# Patient Record
Sex: FEMALE | Race: WHITE | Employment: OTHER | URBAN - METROPOLITAN AREA
[De-identification: names, ages, dates, MRNs, and addresses within clinical notes are randomized per-mention and may not be internally consistent; named-entity substitution may affect disease eponyms.]

---

## 2019-06-14 ENCOUNTER — HOSPITAL ENCOUNTER (OUTPATIENT)
Age: 69
Discharge: HOME | End: 2019-06-16

## 2019-06-14 ENCOUNTER — OFFICE VISIT (OUTPATIENT)
Dept: FAMILY MEDICINE CLINIC | Age: 69
End: 2019-06-14

## 2019-06-14 VITALS
SYSTOLIC BLOOD PRESSURE: 136 MMHG | TEMPERATURE: 97.7 F | WEIGHT: 162 LBS | DIASTOLIC BLOOD PRESSURE: 76 MMHG | HEART RATE: 100 BPM | BODY MASS INDEX: 26.99 KG/M2 | HEIGHT: 65 IN | OXYGEN SATURATION: 98 %

## 2019-06-14 DIAGNOSIS — M79.10 MYALGIA: ICD-10-CM

## 2019-06-14 DIAGNOSIS — R01.1 HEART MURMUR: ICD-10-CM

## 2019-06-14 DIAGNOSIS — R07.0 PAIN IN THROAT: Primary | ICD-10-CM

## 2019-06-14 LAB
ANION GAP SERPL CALCULATED.3IONS-SCNC: 19 MMOL/L (ref 7–16)
BASOPHILS ABSOLUTE: 0.07 E9/L (ref 0–0.2)
BASOPHILS RELATIVE PERCENT: 0.7 % (ref 0–2)
BUN BLDV-MCNC: 12 MG/DL (ref 8–23)
CALCIUM SERPL-MCNC: 10.1 MG/DL (ref 8.6–10.2)
CHLORIDE BLD-SCNC: 100 MMOL/L (ref 98–107)
CO2: 23 MMOL/L (ref 22–29)
CREAT SERPL-MCNC: 0.6 MG/DL (ref 0.5–1)
EOSINOPHILS ABSOLUTE: 0.12 E9/L (ref 0.05–0.5)
EOSINOPHILS RELATIVE PERCENT: 1.2 % (ref 0–6)
GFR AFRICAN AMERICAN: >60
GFR NON-AFRICAN AMERICAN: >60 ML/MIN/1.73
GLUCOSE BLD-MCNC: 109 MG/DL (ref 74–99)
HCT VFR BLD CALC: 39.5 % (ref 34–48)
HEMOGLOBIN: 12.2 G/DL (ref 11.5–15.5)
IMMATURE GRANULOCYTES #: 0.04 E9/L
IMMATURE GRANULOCYTES %: 0.4 % (ref 0–5)
LYMPHOCYTES ABSOLUTE: 2.02 E9/L (ref 1.5–4)
LYMPHOCYTES RELATIVE PERCENT: 20 % (ref 20–42)
MCH RBC QN AUTO: 27.4 PG (ref 26–35)
MCHC RBC AUTO-ENTMCNC: 30.9 % (ref 32–34.5)
MCV RBC AUTO: 88.8 FL (ref 80–99.9)
MONOCYTES ABSOLUTE: 0.81 E9/L (ref 0.1–0.95)
MONOCYTES RELATIVE PERCENT: 8 % (ref 2–12)
NEUTROPHILS ABSOLUTE: 7.06 E9/L (ref 1.8–7.3)
NEUTROPHILS RELATIVE PERCENT: 69.7 % (ref 43–80)
PDW BLD-RTO: 14.3 FL (ref 11.5–15)
PLATELET # BLD: 393 E9/L (ref 130–450)
PMV BLD AUTO: 10.5 FL (ref 7–12)
POTASSIUM SERPL-SCNC: 4.2 MMOL/L (ref 3.5–5)
RBC # BLD: 4.45 E12/L (ref 3.5–5.5)
RHEUMATOID FACTOR: 15 IU/ML (ref 0–13)
S PYO AG THROAT QL: NORMAL
SEDIMENTATION RATE, ERYTHROCYTE: 49 MM/HR (ref 0–20)
SODIUM BLD-SCNC: 142 MMOL/L (ref 132–146)
WBC # BLD: 10.1 E9/L (ref 4.5–11.5)

## 2019-06-14 PROCEDURE — 80048 BASIC METABOLIC PNL TOTAL CA: CPT

## 2019-06-14 PROCEDURE — 99214 OFFICE O/P EST MOD 30 MIN: CPT | Performed by: NURSE PRACTITIONER

## 2019-06-14 PROCEDURE — 86431 RHEUMATOID FACTOR QUANT: CPT

## 2019-06-14 PROCEDURE — 85025 COMPLETE CBC W/AUTO DIFF WBC: CPT

## 2019-06-14 PROCEDURE — 85651 RBC SED RATE NONAUTOMATED: CPT

## 2019-06-14 PROCEDURE — 36415 COLL VENOUS BLD VENIPUNCTURE: CPT

## 2019-06-14 PROCEDURE — 87880 STREP A ASSAY W/OPTIC: CPT | Performed by: NURSE PRACTITIONER

## 2019-06-14 ASSESSMENT — ENCOUNTER SYMPTOMS
EYES NEGATIVE: 1
CHEST TIGHTNESS: 0
CONSTIPATION: 0
SHORTNESS OF BREATH: 0
DIARRHEA: 0
ABDOMINAL PAIN: 0
NAUSEA: 0
COUGH: 0
ALLERGIC/IMMUNOLOGIC NEGATIVE: 1

## 2019-06-14 ASSESSMENT — PATIENT HEALTH QUESTIONNAIRE - PHQ9
SUM OF ALL RESPONSES TO PHQ QUESTIONS 1-9: 0
SUM OF ALL RESPONSES TO PHQ9 QUESTIONS 1 & 2: 0
1. LITTLE INTEREST OR PLEASURE IN DOING THINGS: 0
2. FEELING DOWN, DEPRESSED OR HOPELESS: 0
SUM OF ALL RESPONSES TO PHQ QUESTIONS 1-9: 0

## 2019-06-14 NOTE — PROGRESS NOTES
2019     Jenn Harris (:  1950) is a 77 y.o. female, here for evaluation of the following medical concerns:  Chief Complaint   Patient presents with    Neck Pain    Arm Pain    Knee Pain        HPI:  77 y.o. female presents Over the last several months had a pain in neck that came up to top of head. Saw a chiropractors. Also had a massage which helped immensely. In the meantime has achy shoulders and then down to knees. Family thought she had strep, however, no other sx. Some difficulty with some weakness. Her strep is negative but will obtain lab to eval for another autoimmune issue. I also discussed with her again, how important she quit cancelling her cardiac appointment especially in light the this recent weakness. Though no SOB she can not ignore the issue    She is a tough women who is somewhat wary of traditional medicine. Past Medical History:   Diagnosis Date    Heart murmur     Varicosities of leg        No current outpatient medications on file prior to visit. No current facility-administered medications on file prior to visit. No Known Allergies    Family History   Problem Relation Age of Onset    High Blood Pressure Mother     Heart Disease Father        Past Surgical History:   Procedure Laterality Date     SECTION      HIP FRACTURE SURGERY Right        Social History     Tobacco Use    Smoking status: Never Smoker    Smokeless tobacco: Never Used   Substance Use Topics    Alcohol use: No    Drug use: No       ROS:      Review of Systems   Constitutional: Negative. HENT: Negative. Eyes: Negative. Respiratory: Negative for cough, chest tightness and shortness of breath. Cardiovascular: Negative for chest pain, palpitations and leg swelling. Gastrointestinal: Negative for abdominal pain, constipation, diarrhea and nausea. Endocrine: Negative. Genitourinary: Negative.     Musculoskeletal: Positive for gait problem, joint swelling, myalgias, neck pain and neck stiffness. Negative for arthralgias. Skin: Negative. Allergic/Immunologic: Negative. Neurological: Positive for weakness. Negative for dizziness, tremors, light-headedness and headaches. Psychiatric/Behavioral: Negative. Vitals:    06/14/19 1149   BP: 136/76   Pulse: 100   Temp: 97.7 °F (36.5 °C)   SpO2: 98%   Weight: 162 lb (73.5 kg)   Height: 5' 5\" (1.651 m)     Estimated body mass index is 26.96 kg/m² as calculated from the following:    Height as of this encounter: 5' 5\" (1.651 m). Weight as of this encounter: 162 lb (73.5 kg). PHYSICAL EXAM:    VS:  Blood pressure 136/76, pulse 100, temperature 97.7 °F (36.5 °C), height 5' 5\" (1.651 m), weight 162 lb (73.5 kg), SpO2 98 %. Physical Exam   Constitutional: She is oriented to person, place, and time. She appears well-developed and well-nourished. No distress. HENT:   Head: Normocephalic and atraumatic. Right Ear: Hearing, tympanic membrane, external ear and ear canal normal.   Left Ear: Hearing, tympanic membrane, external ear and ear canal normal.   Nose: Nose normal. No mucosal edema or rhinorrhea. Mouth/Throat: Oropharynx is clear and moist and mucous membranes are normal. No oropharyngeal exudate. Eyes: Pupils are equal, round, and reactive to light. Conjunctivae and EOM are normal.   Neck: Normal range of motion. Neck supple. No JVD present. Carotid bruit is not present. No thyromegaly present. Cardiovascular: Normal rate, regular rhythm and intact distal pulses. Murmur heard. Systolic murmur is present with a grade of 4/6. Pulmonary/Chest: Effort normal and breath sounds normal. No respiratory distress. She has no wheezes. She has no rales. Abdominal: Soft. Bowel sounds are normal. She exhibits no distension and no mass. There is no hepatosplenomegaly. There is no tenderness. Musculoskeletal: Normal range of motion. She exhibits no edema, tenderness or deformity. Weak  4/5. Needed to use arms to up and down in chair and help on and off table   Neurological: She is alert and oriented to person, place, and time. She has normal strength and normal reflexes. No cranial nerve deficit or sensory deficit. Skin: Skin is warm, dry and intact. Capillary refill takes less than 2 seconds. No rash noted. Psychiatric: She has a normal mood and affect. Her speech is normal and behavior is normal. Judgment and thought content normal. Cognition and memory are normal.   Vitals reviewed. ASSESSMENT/PLAN:    Sydnie Rowland was seen today for neck pain, arm pain and knee pain. Diagnoses and all orders for this visit:    Pain in throat  -     POCT rapid strep A         Orders Placed This Encounter   Procedures    POCT rapid strep A        No follow-ups on file. An electronic signature was used to authenticate this note.     --YARITZA Woods - CNP on 6/14/2019 at 12:04 PM

## 2019-06-18 ENCOUNTER — TELEPHONE (OUTPATIENT)
Dept: FAMILY MEDICINE CLINIC | Age: 69
End: 2019-06-18

## 2019-06-18 NOTE — TELEPHONE ENCOUNTER
----- Message from YARITZA Hogan CNP sent at 6/18/2019  3:46 PM EDT -----  She has some inflammation going on. How is she feeling.  Will need to recheck in 3-4 months so should be seen back

## 2019-07-03 ENCOUNTER — OFFICE VISIT (OUTPATIENT)
Dept: CARDIOLOGY CLINIC | Age: 69
End: 2019-07-03

## 2019-07-03 VITALS
HEIGHT: 66 IN | WEIGHT: 165 LBS | BODY MASS INDEX: 26.52 KG/M2 | DIASTOLIC BLOOD PRESSURE: 80 MMHG | HEART RATE: 108 BPM | SYSTOLIC BLOOD PRESSURE: 134 MMHG | RESPIRATION RATE: 16 BRPM

## 2019-07-03 DIAGNOSIS — R06.02 SHORTNESS OF BREATH: ICD-10-CM

## 2019-07-03 DIAGNOSIS — R01.1 HEART MURMUR: Primary | ICD-10-CM

## 2019-07-03 PROCEDURE — 93000 ELECTROCARDIOGRAM COMPLETE: CPT | Performed by: INTERNAL MEDICINE

## 2019-07-03 PROCEDURE — 99204 OFFICE O/P NEW MOD 45 MIN: CPT | Performed by: INTERNAL MEDICINE

## 2019-07-03 RX ORDER — VITAMIN B COMPLEX
1 CAPSULE ORAL DAILY
COMMUNITY

## 2019-07-03 RX ORDER — UBIDECARENONE 75 MG
200 CAPSULE ORAL 2 TIMES DAILY
COMMUNITY

## 2019-07-03 RX ORDER — IBUPROFEN 200 MG
200 TABLET ORAL EVERY 6 HOURS PRN
COMMUNITY
End: 2020-02-24

## 2019-07-03 RX ORDER — CYANOCOBALAMIN (VITAMIN B-12) 500 MCG
400 LOZENGE ORAL 2 TIMES DAILY
COMMUNITY

## 2019-07-03 RX ORDER — ASCORBIC ACID 500 MG
500 TABLET ORAL DAILY
COMMUNITY

## 2019-07-08 ENCOUNTER — TELEPHONE (OUTPATIENT)
Dept: CARDIOLOGY | Age: 69
End: 2019-07-08

## 2020-01-31 ENCOUNTER — TELEPHONE (OUTPATIENT)
Dept: FAMILY MEDICINE CLINIC | Age: 70
End: 2020-01-31

## 2020-02-20 ENCOUNTER — OFFICE VISIT (OUTPATIENT)
Dept: FAMILY MEDICINE CLINIC | Age: 70
End: 2020-02-20

## 2020-02-20 VITALS
HEART RATE: 115 BPM | BODY MASS INDEX: 24.33 KG/M2 | HEIGHT: 67 IN | WEIGHT: 155 LBS | TEMPERATURE: 97.7 F | SYSTOLIC BLOOD PRESSURE: 130 MMHG | DIASTOLIC BLOOD PRESSURE: 70 MMHG | OXYGEN SATURATION: 96 %

## 2020-02-20 PROCEDURE — 99213 OFFICE O/P EST LOW 20 MIN: CPT | Performed by: PHYSICIAN ASSISTANT

## 2020-02-20 RX ORDER — AMOXICILLIN 500 MG/1
500 CAPSULE ORAL 3 TIMES DAILY
Qty: 30 CAPSULE | Refills: 0 | Status: ON HOLD
Start: 2020-02-20 | End: 2020-02-25 | Stop reason: HOSPADM

## 2020-02-20 RX ORDER — BROMPHENIRAMINE MALEATE, PSEUDOEPHEDRINE HYDROCHLORIDE, AND DEXTROMETHORPHAN HYDROBROMIDE 2; 30; 10 MG/5ML; MG/5ML; MG/5ML
5 SYRUP ORAL 4 TIMES DAILY PRN
Qty: 120 ML | Refills: 0 | Status: ON HOLD
Start: 2020-02-20 | End: 2020-02-25 | Stop reason: HOSPADM

## 2020-02-20 ASSESSMENT — ENCOUNTER SYMPTOMS
GASTROINTESTINAL NEGATIVE: 1
COUGH: 1
EYES NEGATIVE: 1

## 2020-02-20 NOTE — PROGRESS NOTES
normal. No congestion or rhinorrhea. Mouth/Throat:      Mouth: Mucous membranes are dry. Pharynx: Oropharynx is clear. No oropharyngeal exudate. Eyes:      Conjunctiva/sclera: Conjunctivae normal.      Pupils: Pupils are equal, round, and reactive to light. Neck:      Musculoskeletal: Normal range of motion and neck supple. Cardiovascular:      Rate and Rhythm: Normal rate and regular rhythm. Pulses: Normal pulses. Pulmonary:      Effort: Pulmonary effort is normal. No respiratory distress. Breath sounds: No stridor. Rales present. No wheezing or rhonchi. Chest:      Chest wall: No tenderness. Neurological:      Mental Status: She is alert. Assessment/Plan:  There are no diagnoses linked to this encounter. Pt. to follow up if PCP if no better 1 week.      Luis Gonzalez PA-C

## 2020-02-24 ENCOUNTER — OFFICE VISIT (OUTPATIENT)
Dept: FAMILY MEDICINE CLINIC | Age: 70
End: 2020-02-24

## 2020-02-24 VITALS
OXYGEN SATURATION: 93 % | DIASTOLIC BLOOD PRESSURE: 74 MMHG | WEIGHT: 156 LBS | TEMPERATURE: 97.4 F | HEIGHT: 66 IN | HEART RATE: 127 BPM | BODY MASS INDEX: 25.07 KG/M2 | SYSTOLIC BLOOD PRESSURE: 124 MMHG

## 2020-02-24 PROCEDURE — 93000 ELECTROCARDIOGRAM COMPLETE: CPT | Performed by: FAMILY MEDICINE

## 2020-02-24 PROCEDURE — 99204 OFFICE O/P NEW MOD 45 MIN: CPT | Performed by: FAMILY MEDICINE

## 2020-02-24 RX ORDER — MAGNESIUM OXIDE 400 MG/1
400 TABLET ORAL DAILY
COMMUNITY

## 2020-02-24 RX ORDER — FUROSEMIDE 20 MG/1
20 TABLET ORAL DAILY
Qty: 3 TABLET | Refills: 0 | Status: ON HOLD
Start: 2020-02-24 | End: 2020-02-28 | Stop reason: SDUPTHER

## 2020-02-24 RX ORDER — CALCIUM CARBONATE 500(1250)
500 TABLET ORAL DAILY
COMMUNITY

## 2020-02-24 ASSESSMENT — PATIENT HEALTH QUESTIONNAIRE - PHQ9
SUM OF ALL RESPONSES TO PHQ9 QUESTIONS 1 & 2: 0
1. LITTLE INTEREST OR PLEASURE IN DOING THINGS: 0
2. FEELING DOWN, DEPRESSED OR HOPELESS: 0
SUM OF ALL RESPONSES TO PHQ QUESTIONS 1-9: 0
SUM OF ALL RESPONSES TO PHQ QUESTIONS 1-9: 0

## 2020-02-24 NOTE — PROGRESS NOTES
[facility]  Office Progress Note - Dr. Evi Huitron  2/24/20    CC:   Chief Complaint   Patient presents with    Establish Care        S: Establishing care today. She had a cough recently. Noted lots of people at [facility] were ill as well. Maybe was going on about 3 weeks and worsened more recently. She went to urgent care after having SOB at night. She was given Rx for amoxil and Bromfed. She also had chiropractic care. She thinks she is improving. She had bene doctoring with Dr. Pepper helm in [facility] and an arthritis center in [facility]. She ultimately ddnt get any relief and she used some own home remedies. She has bene feeling well for the past month or two. She is using a pessary for uterine prolapse with [facility]. Reviewing her history, she has severe aortic stenosis which she saw cardiologist for last summer. Reviewing her symptoms, I am concerned that this may be more aortic stenosis related than respiratory infection. She reports exertional fatigue and shortness of breath. She has had to stop when climbing stairs recently. She has had no chest pain. No dizziness or lightheadedness. She does note orthopnea and PND. She has had to prop herself up recently. Denies ankle swelling, chest pain, pressure, heaviness. Chest x-ray performed today shows mild bilateral pleural effusions and vascular congestion. Awaiting radiologist read. EKG today shows sinus tachycardia with rate 119. Leftward axis. Poor R wave progression. Q waves in V1 and V2. ST elevations in V3 and V4 with some depressions in V5 and V6, suspect ventricular hypertrophy with strain pattern. When compared to her EKG from July 2019, change has occurred.     Past Medical History:   Diagnosis Date    Heart murmur     Heart murmur     Since birth    Varicosities of leg        Family History   Problem Relation Age of Onset    High Blood Pressure Mother     Heart Disease Father    [facility] Heart Disease Brother         cabg    Heart Disease Sister     No Known Problems Brother     Other Sister         Tumor, unsure, possibly uterine . Past Surgical History:   Procedure Laterality Date     SECTION      HIP FRACTURE SURGERY Right In her 46s. after a fall. Dr. 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 Ron [place] 55. ROS:  No CP, No palpitations,   +sob, +cough,   No abd pain, No heartburn,   No headaches,   No tingling, No numbness, No weakness,   No bowel changes, No hematochezia, No melena,  No bladder changes, No hematuria  No skin rashes, No skin lesions. No vision changes, No hearing changes,   No polyuria, polydipsia, polyphagia. Stable mood. ROS otherwise negative unless as listed in HPI. Chart reviewed and updated where appropriate for PMH, Fam, and Soc Hx. Physical Exam   /74 (Site: Left Upper Arm, Position: Sitting, Cuff Size: Medium Adult)   Pulse 127   Temp 97.4 °F (36.3 °C) (Temporal)   Ht 5' 6\" (1.676 m)   Wt 156 lb (70.8 kg)   SpO2 93%   BMI 25.18 kg/m² Pulse on my recheck 114 bpm.  Wt Readings from Last 3 Encounters:   20 156 lb (70.8 kg)   20 155 lb (70.3 kg)   19 165 lb (74.8 kg)       Constitutional:    She is oriented to person, place, and time. She appears well-developed and well-nourished. HENT:    Nose: Nose normal.    Mouth/Throat: Oropharynx is clear and moist.   Eyes:    Conjunctivae are normal.    Pupils are equal, round, and reactive to light. EOMI. Neck:    Normal range of motion. No thyromegaly or nodules noted. No bruit. No adenopathy. Cardiovascular:    Mild tachycardia. , regular rhythm and normal heart sounds.      3 out of 6 right upper sternal border systolic murmur. No gallop and no friction rub. Pulmonary/Chest:    Effort normal and breath sounds normal.    No wheezes. No rales or rhonchi. Abdominal:    Soft. Thin. Bowel sounds are normal.    No distension. No tenderness. Musculoskeletal:    Normal range of motion. No joint swelling noted. No pitting peripheral edema. Skin:    Skin is warm and dry. No rashes, lesions. Psychiatric:    She has a normal mood and affect. Normal groom and dress. Current Outpatient Medications on File Prior to Visit   Medication Sig Dispense Refill    magnesium oxide (MAG-OX) 400 MG tablet Take 400 mg by mouth daily      calcium carbonate (OSCAL) 500 MG TABS tablet Take 500 mg by mouth daily      amoxicillin (AMOXIL) 500 MG capsule Take 1 capsule by mouth 3 times daily for 10 days 30 capsule 0    brompheniramine-pseudoephedrine-DM 2-30-10 MG/5ML syrup Take 5 mLs by mouth 4 times daily as needed for Congestion or Cough 120 mL 0    vitamin E 1000 units capsule Take 1,000 Units by mouth daily      Coenzyme Q10 (CO Q-10) 100 MG CAPS Take by mouth daily      vitamin C (ASCORBIC ACID) 500 MG tablet Take 500 mg by mouth daily      b complex vitamins capsule Take 1 capsule by mouth daily      NONFORMULARY Indications: hawthorn berry, soy lecithin, kelp white oak bark, lira seal root       Cod Liver Oil 1000 MG CAPS Take by mouth      Magnesium ([employer] TRIPLE MAGNESIUM COMPLEX) 400 MG CAPS Take by mouth       No current facility-administered medications on file prior to visit. Patient Active Problem List   Diagnosis Code    Heart murmur R01.1        Assessment / Plan  Elio Thurman was seen today for establish care. Diagnoses and all orders for this visit:    Shortness of breath -new  -     EKG 12 Lead; Future  -     XR CHEST STANDARD (2 VW); Future  -     EKG 12 Lead  -     furosemide (LASIX) 20 MG tablet;  Take 1 tablet by mouth daily    Aortic valve stenosis, etiology of cardiac valve disease

## 2020-02-25 ENCOUNTER — HOSPITAL ENCOUNTER (INPATIENT)
Age: 70
LOS: 1 days | Discharge: ANOTHER HOSPITAL | DRG: 291 | End: 2020-02-25
Attending: EMERGENCY MEDICINE | Admitting: FAMILY MEDICINE

## 2020-02-25 ENCOUNTER — APPOINTMENT (OUTPATIENT)
Dept: GENERAL RADIOLOGY | Age: 70
DRG: 291 | End: 2020-02-25

## 2020-02-25 ENCOUNTER — HOSPITAL ENCOUNTER (INPATIENT)
Age: 70
LOS: 3 days | Discharge: HOME | DRG: 286 | End: 2020-02-28
Attending: INTERNAL MEDICINE | Admitting: HOSPITALIST

## 2020-02-25 VITALS
BODY MASS INDEX: 24.43 KG/M2 | HEART RATE: 101 BPM | TEMPERATURE: 97.5 F | WEIGHT: 152 LBS | SYSTOLIC BLOOD PRESSURE: 127 MMHG | RESPIRATION RATE: 16 BRPM | OXYGEN SATURATION: 94 % | DIASTOLIC BLOOD PRESSURE: 75 MMHG | HEIGHT: 66 IN

## 2020-02-25 PROBLEM — I50.9 CHF WITH UNKNOWN LVEF (HCC): Status: ACTIVE | Noted: 2020-02-25

## 2020-02-25 PROBLEM — R63.4 WEIGHT LOSS: Status: ACTIVE | Noted: 2020-02-25

## 2020-02-25 PROBLEM — I35.0 AORTIC VALVE STENOSIS: Status: ACTIVE | Noted: 2020-02-25

## 2020-02-25 PROBLEM — I35.0 AORTIC STENOSIS, MILD: Status: ACTIVE | Noted: 2020-02-25

## 2020-02-25 PROBLEM — J96.01 ACUTE RESPIRATORY FAILURE WITH HYPOXIA (HCC): Status: ACTIVE | Noted: 2020-02-25

## 2020-02-25 PROBLEM — I48.92 ATRIAL FLUTTER (HCC): Status: ACTIVE | Noted: 2020-02-25

## 2020-02-25 PROBLEM — R06.00 DYSPNEA: Status: ACTIVE | Noted: 2020-02-25

## 2020-02-25 LAB
ANION GAP SERPL CALCULATED.3IONS-SCNC: 17 MMOL/L (ref 7–16)
APTT: 32.7 SEC (ref 24.5–35.1)
BASOPHILS ABSOLUTE: 0.07 E9/L (ref 0–0.2)
BASOPHILS RELATIVE PERCENT: 0.7 % (ref 0–2)
BUN BLDV-MCNC: 12 MG/DL (ref 8–23)
CALCIUM SERPL-MCNC: 9.6 MG/DL (ref 8.6–10.2)
CHLORIDE BLD-SCNC: 92 MMOL/L (ref 98–107)
CO2: 25 MMOL/L (ref 22–29)
CREAT SERPL-MCNC: 0.6 MG/DL (ref 0.5–1)
D DIMER: 203 NG/ML DDU
EKG ATRIAL RATE: 106 BPM
EKG ATRIAL RATE: 345 BPM
EKG P AXIS: 17 DEGREES
EKG P AXIS: 56 DEGREES
EKG P-R INTERVAL: 158 MS
EKG Q-T INTERVAL: 326 MS
EKG Q-T INTERVAL: 354 MS
EKG QRS DURATION: 84 MS
EKG QRS DURATION: 98 MS
EKG QTC CALCULATION (BAZETT): 450 MS
EKG QTC CALCULATION (BAZETT): 470 MS
EKG R AXIS: -32 DEGREES
EKG R AXIS: -35 DEGREES
EKG T AXIS: 114 DEGREES
EKG T AXIS: 162 DEGREES
EKG VENTRICULAR RATE: 106 BPM
EKG VENTRICULAR RATE: 115 BPM
EOSINOPHILS ABSOLUTE: 0.1 E9/L (ref 0.05–0.5)
EOSINOPHILS RELATIVE PERCENT: 0.9 % (ref 0–6)
GFR AFRICAN AMERICAN: >60
GFR NON-AFRICAN AMERICAN: >60 ML/MIN/1.73
GLUCOSE BLD-MCNC: 151 MG/DL (ref 74–99)
HCT VFR BLD CALC: 44.1 % (ref 34–48)
HEMOGLOBIN: 13.4 G/DL (ref 11.5–15.5)
IMMATURE GRANULOCYTES #: 0.07 E9/L
IMMATURE GRANULOCYTES %: 0.7 % (ref 0–5)
INR BLD: 1.1
LV EF: 38 %
LVEF MODALITY: NORMAL
LYMPHOCYTES ABSOLUTE: 1.88 E9/L (ref 1.5–4)
LYMPHOCYTES RELATIVE PERCENT: 17.6 % (ref 20–42)
MCH RBC QN AUTO: 26.1 PG (ref 26–35)
MCHC RBC AUTO-ENTMCNC: 30.4 % (ref 32–34.5)
MCV RBC AUTO: 85.8 FL (ref 80–99.9)
MONOCYTES ABSOLUTE: 0.7 E9/L (ref 0.1–0.95)
MONOCYTES RELATIVE PERCENT: 6.6 % (ref 2–12)
NEUTROPHILS ABSOLUTE: 7.84 E9/L (ref 1.8–7.3)
NEUTROPHILS RELATIVE PERCENT: 73.5 % (ref 43–80)
PDW BLD-RTO: 16.3 FL (ref 11.5–15)
PLATELET # BLD: 538 E9/L (ref 130–450)
PMV BLD AUTO: 10.7 FL (ref 7–12)
POTASSIUM SERPL-SCNC: 3.5 MMOL/L (ref 3.5–5)
PRO-BNP: ABNORMAL PG/ML (ref 0–125)
PROTHROMBIN TIME: 12.7 SEC (ref 9.3–12.4)
RBC # BLD: 5.14 E12/L (ref 3.5–5.5)
SODIUM BLD-SCNC: 134 MMOL/L (ref 132–146)
TROPONIN: <0.01 NG/ML (ref 0–0.03)
TSH SERPL DL<=0.05 MIU/L-ACNC: 2.18 UIU/ML (ref 0.27–4.2)
WBC # BLD: 10.7 E9/L (ref 4.5–11.5)

## 2020-02-25 PROCEDURE — 6360000002 HC RX W HCPCS: Performed by: EMERGENCY MEDICINE

## 2020-02-25 PROCEDURE — 85025 COMPLETE CBC W/AUTO DIFF WBC: CPT

## 2020-02-25 PROCEDURE — 80048 BASIC METABOLIC PNL TOTAL CA: CPT

## 2020-02-25 PROCEDURE — 93010 ELECTROCARDIOGRAM REPORT: CPT | Performed by: INTERNAL MEDICINE

## 2020-02-25 PROCEDURE — 99285 EMERGENCY DEPT VISIT HI MDM: CPT

## 2020-02-25 PROCEDURE — 93005 ELECTROCARDIOGRAM TRACING: CPT | Performed by: EMERGENCY MEDICINE

## 2020-02-25 PROCEDURE — 85378 FIBRIN DEGRADE SEMIQUANT: CPT

## 2020-02-25 PROCEDURE — 2060000000 HC ICU INTERMEDIATE R&B

## 2020-02-25 PROCEDURE — 71045 X-RAY EXAM CHEST 1 VIEW: CPT

## 2020-02-25 PROCEDURE — 2580000003 HC RX 258: Performed by: HOSPITALIST

## 2020-02-25 PROCEDURE — 84484 ASSAY OF TROPONIN QUANT: CPT

## 2020-02-25 PROCEDURE — 85610 PROTHROMBIN TIME: CPT

## 2020-02-25 PROCEDURE — 6360000002 HC RX W HCPCS: Performed by: FAMILY MEDICINE

## 2020-02-25 PROCEDURE — 94761 N-INVAS EAR/PLS OXIMETRY MLT: CPT

## 2020-02-25 PROCEDURE — 96374 THER/PROPH/DIAG INJ IV PUSH: CPT

## 2020-02-25 PROCEDURE — 94664 DEMO&/EVAL PT USE INHALER: CPT

## 2020-02-25 PROCEDURE — 93306 TTE W/DOPPLER COMPLETE: CPT

## 2020-02-25 PROCEDURE — 6370000000 HC RX 637 (ALT 250 FOR IP): Performed by: FAMILY MEDICINE

## 2020-02-25 PROCEDURE — 84443 ASSAY THYROID STIM HORMONE: CPT

## 2020-02-25 PROCEDURE — 85730 THROMBOPLASTIN TIME PARTIAL: CPT

## 2020-02-25 PROCEDURE — 6360000002 HC RX W HCPCS: Performed by: HOSPITALIST

## 2020-02-25 PROCEDURE — 99255 IP/OBS CONSLTJ NEW/EST HI 80: CPT | Performed by: INTERNAL MEDICINE

## 2020-02-25 PROCEDURE — 83880 ASSAY OF NATRIURETIC PEPTIDE: CPT

## 2020-02-25 PROCEDURE — 99223 1ST HOSP IP/OBS HIGH 75: CPT | Performed by: FAMILY MEDICINE

## 2020-02-25 RX ORDER — POTASSIUM CHLORIDE 20 MEQ/1
20 TABLET, EXTENDED RELEASE ORAL 2 TIMES DAILY WITH MEALS
Status: DISCONTINUED | OUTPATIENT
Start: 2020-02-25 | End: 2020-02-25 | Stop reason: HOSPADM

## 2020-02-25 RX ORDER — ACETAMINOPHEN 650 MG/1
650 SUPPOSITORY RECTAL EVERY 6 HOURS PRN
Status: DISCONTINUED | OUTPATIENT
Start: 2020-02-25 | End: 2020-02-28 | Stop reason: HOSPADM

## 2020-02-25 RX ORDER — POLYETHYLENE GLYCOL 3350 17 G/17G
17 POWDER, FOR SOLUTION ORAL DAILY PRN
Status: DISCONTINUED | OUTPATIENT
Start: 2020-02-25 | End: 2020-02-28 | Stop reason: HOSPADM

## 2020-02-25 RX ORDER — ACETAMINOPHEN 325 MG/1
650 TABLET ORAL EVERY 6 HOURS PRN
Status: DISCONTINUED | OUTPATIENT
Start: 2020-02-25 | End: 2020-02-28 | Stop reason: HOSPADM

## 2020-02-25 RX ORDER — SODIUM CHLORIDE 0.9 % (FLUSH) 0.9 %
10 SYRINGE (ML) INJECTION EVERY 12 HOURS SCHEDULED
Status: DISCONTINUED | OUTPATIENT
Start: 2020-02-25 | End: 2020-02-28 | Stop reason: HOSPADM

## 2020-02-25 RX ORDER — SODIUM CHLORIDE 0.9 % (FLUSH) 0.9 %
10 SYRINGE (ML) INJECTION PRN
Status: DISCONTINUED | OUTPATIENT
Start: 2020-02-25 | End: 2020-02-28 | Stop reason: HOSPADM

## 2020-02-25 RX ORDER — PROMETHAZINE HYDROCHLORIDE 25 MG/1
12.5 TABLET ORAL EVERY 6 HOURS PRN
Status: DISCONTINUED | OUTPATIENT
Start: 2020-02-25 | End: 2020-02-28 | Stop reason: HOSPADM

## 2020-02-25 RX ORDER — PROCHLORPERAZINE EDISYLATE 5 MG/ML
10 INJECTION INTRAMUSCULAR; INTRAVENOUS EVERY 6 HOURS PRN
Status: DISCONTINUED | OUTPATIENT
Start: 2020-02-25 | End: 2020-02-25 | Stop reason: HOSPADM

## 2020-02-25 RX ORDER — ACETAMINOPHEN 650 MG/1
650 SUPPOSITORY RECTAL EVERY 6 HOURS PRN
Status: DISCONTINUED | OUTPATIENT
Start: 2020-02-25 | End: 2020-02-25 | Stop reason: HOSPADM

## 2020-02-25 RX ORDER — FUROSEMIDE 10 MG/ML
40 INJECTION INTRAMUSCULAR; INTRAVENOUS ONCE
Status: COMPLETED | OUTPATIENT
Start: 2020-02-25 | End: 2020-02-25

## 2020-02-25 RX ORDER — FUROSEMIDE 10 MG/ML
40 INJECTION INTRAMUSCULAR; INTRAVENOUS DAILY
Status: DISCONTINUED | OUTPATIENT
Start: 2020-02-25 | End: 2020-02-26

## 2020-02-25 RX ORDER — ONDANSETRON 2 MG/ML
4 INJECTION INTRAMUSCULAR; INTRAVENOUS EVERY 6 HOURS PRN
Status: DISCONTINUED | OUTPATIENT
Start: 2020-02-25 | End: 2020-02-28 | Stop reason: HOSPADM

## 2020-02-25 RX ORDER — FUROSEMIDE 10 MG/ML
40 INJECTION INTRAMUSCULAR; INTRAVENOUS 2 TIMES DAILY
Status: DISCONTINUED | OUTPATIENT
Start: 2020-02-25 | End: 2020-02-25 | Stop reason: HOSPADM

## 2020-02-25 RX ORDER — ACETAMINOPHEN 325 MG/1
650 TABLET ORAL EVERY 6 HOURS PRN
Status: DISCONTINUED | OUTPATIENT
Start: 2020-02-25 | End: 2020-02-25 | Stop reason: HOSPADM

## 2020-02-25 RX ORDER — POLYETHYLENE GLYCOL 3350 17 G/17G
17 POWDER, FOR SOLUTION ORAL DAILY PRN
Status: DISCONTINUED | OUTPATIENT
Start: 2020-02-25 | End: 2020-02-25 | Stop reason: HOSPADM

## 2020-02-25 RX ORDER — CALCIUM CARBONATE 500(1250)
500 TABLET ORAL DAILY
Status: DISCONTINUED | OUTPATIENT
Start: 2020-02-25 | End: 2020-02-25 | Stop reason: HOSPADM

## 2020-02-25 RX ADMIN — SODIUM CHLORIDE, PRESERVATIVE FREE 10 ML: 5 INJECTION INTRAVENOUS at 21:55

## 2020-02-25 RX ADMIN — CALCIUM 500 MG: 500 TABLET ORAL at 08:56

## 2020-02-25 RX ADMIN — FUROSEMIDE 40 MG: 10 INJECTION, SOLUTION INTRAMUSCULAR; INTRAVENOUS at 05:41

## 2020-02-25 RX ADMIN — ALBUTEROL SULFATE 2.5 MG: 2.5 SOLUTION RESPIRATORY (INHALATION) at 05:42

## 2020-02-25 RX ADMIN — ENOXAPARIN SODIUM 40 MG: 40 INJECTION SUBCUTANEOUS at 08:56

## 2020-02-25 RX ADMIN — FUROSEMIDE 40 MG: 10 INJECTION, SOLUTION INTRAMUSCULAR; INTRAVENOUS at 19:00

## 2020-02-25 RX ADMIN — POTASSIUM CHLORIDE 20 MEQ: 20 TABLET, EXTENDED RELEASE ORAL at 08:56

## 2020-02-25 ASSESSMENT — PAIN SCALES - GENERAL
PAINLEVEL_OUTOF10: 0

## 2020-02-25 ASSESSMENT — ENCOUNTER SYMPTOMS
BLOOD IN STOOL: 0
ABDOMINAL PAIN: 0
APNEA: 1
DIARRHEA: 0
PHOTOPHOBIA: 0
CONSTIPATION: 0
SINUS PAIN: 0
CHEST TIGHTNESS: 0
BACK PAIN: 0
WHEEZING: 0
SHORTNESS OF BREATH: 1
VOMITING: 0
NAUSEA: 0
COUGH: 0

## 2020-02-25 NOTE — H&P
Historical Provider, MD   Cod Liver Oil 1000 MG CAPS Take by mouth    Historical Provider, MD       Allergies:  Patient has no known allergies. Social History:      TOBACCO:   reports that she has never smoked. She has never used smokeless tobacco.  ETOH:   reports no history of alcohol use. Family History:      Reviewed in detail :        Problem Relation Age of Onset    High Blood Pressure Mother     Heart Disease Father     Heart Disease Brother         cabg    Heart Disease Sister     No Known Problems Brother     Other Sister         Tumor, unsure, possibly uterine . REVIEW OF SYSTEMS:   Pertinent positives as noted in the HPI. All other systems reviewed and negative. PHYSICAL EXAM:    /78   Pulse 105   Temp 96.8 °F (36 °C) (Temporal)   Resp 20   SpO2 96%     General appearance:  Lying comfortably in bed. No apparent distress. HEENT:  Normal cephalic, atraumatic without obvious deformity. Pupils equal, round, and reactive to light. Extra ocular muscles intact. Conjunctivae/corneas clear. Neck: Supple, with full range of motion. No jugular venous distention. Trachea midline. Respiratory: Decreased breath sounds bibasilarly. Cardiovascular: Normal S1/S2. Regular rhythm and rate. Abdomen: Soft, non-tender, non-distended with normal bowel sounds. Musculoskeletal:  No clubbing, cyanosis or edema bilaterally. Full range of motion without deformity. Skin: Skin color, texture, turgor normal.  No rashes or lesions. Neurologic:  No focal deficit.   Psychiatric:  Alert and oriented, thought content appropriate, normal insight  Peripheral Pulses: +2 palpable, equal bilaterally       CXR:   Xr Chest Portable    Result Date: 2020  Patient MRN:  [id number] : 1950 Age: 68 years Gender: Female Order Date:  2020 5:15 AM EXAM: XR CHEST PORTABLE one image INDICATION:  chest pain chest pain COMPARISON: 2020 FINDINGS: There is low lung volumes with borderline cardiomegaly. There is vascular congestion with the progressive perihilar and bibasilar infiltrates and pleural effusions. Worsening CHF. EKG:  I have reviewed the EKG with the following interpretation: Sinus rhythm, heart rate 106 bpm.  LVH    Echocardiogram:  Summary   Moderately reduced left ventricular systolic function. Ejection fraction is visually estimated at 35-40%. Moderate concentric left ventricular hypertrophy. Normal right ventricular size and function (TAPSE 1.8 cm). There is doppler evidence of stage II diastolic dysfunction. Moderately dilated left atrium by volume index. Mild mitral regurgitation. Critical aortic stenosis (AV peak velocity 5.4 m/s, AV mean gradient 75   mmHg, AV area 0.5 cm2). Mild tricuspid regurgitation. PASP is estimated at 46 mmHg. Small pericardial effusion. Bilateral pleural effusions. Labs:     Recent Labs     02/25/20  0530   WBC 10.7   HGB 13.4   HCT 44.1   *     Recent Labs     02/25/20  0530      K 3.5   CL 92*   CO2 25   BUN 12   CREATININE 0.6   CALCIUM 9.6     No results for input(s): AST, ALT, BILIDIR, BILITOT, ALKPHOS in the last 72 hours.   Recent Labs     02/25/20  0530   INR 1.1     Recent Labs     02/25/20  0530   TROPONINI <0.01       Urinalysis:    No results found for: NITRU, WBCUA, BACTERIA, RBCUA, BLOODU, [address], [facility] Problems    Diagnosis Date Noted    Aortic stenosis, mild [I35.0] 02/25/2020       ASSESSMENT:  Critical aortic stenosis  Acute combined systolic and diastolic CHF  Bilateral pleural effusion  Small pericardial effusion    PLAN:  Admit to telemetry  Start IV Lasix, monitor strict I/O, daily weight  Will slowly initiate ACE inhibitor, beta-blocker therapy as blood pressure permits  Resume home medications  Cardiology consult, discussed with Dr. Garland Cook who had kindly agreed to see patient in consultation  Cardiothoracic consult for TAVR evaluation  Activity as tolerated        DVT Prophylaxis: Lovenox  Diet: DIET LOW SODIUM 2 GM;  Code Status: Full Code    PT/OT Eval Status: pending    Dispo -TBD       Carmelina Steele MD 2/25/2020 5:00 PM    Thank you Tianna Bundy MD for the opportunity to be involved in this patient's care. If you have any questions or concerns please feel free to contact me at [phone].

## 2020-02-25 NOTE — ED PROVIDER NOTES
Patient was signed out to me pending final laboratory results. Patient's troponin is negative, d-dimer is below threshold for testing further. Chest x-ray shows pleural effusions and vascular congestion suspicious for CHF. With patient's new atrial flutter this is likely the cause of her hypoxia. After reassessing the patient she appeared to be in a more regular rhythm, repeat EKG showed a sinus tachycardia 106 with no findings suggestive of acute ischemia or injury. Family medicine was contacted for admission, will evaluate at bedside and admit. Patient rested comfortably, remaining oxygen dependent during her ED course. She was given a dose of diuretic prior to signout. HPI:  20, Time: 4:57 AM        Carlos Reyna is a 68 y.o. female presenting to the ED for shortness of breath, beginning 2-3 hours ago. The complaint has been persistent, moderate in severity, and worsened by nothing. Patient denies any chest pain or pressure, no leg swelling or leg pain, no recent travel, no colored sputum production or hemoptysis. Patient denies any change in bowel bladder habit patterns associated; nausea vomiting. No rotations, no syncope no near syncopal episodes reported no diaphoresis. Symptoms are slightly exacerbated by exertion. No relieving factors. Review of Systems:   Pertinent positives and negatives are stated within HPI, all other systems reviewed and are negative.    --------------------------------------------- PAST HISTORY ---------------------------------------------  Past Medical History:  has a past medical history of Heart murmur, Heart murmur, and Varicosities of leg. Past Surgical History:  has a past surgical history that includes  section and Hip fracture surgery (Right, In her 46s. after a fall. ). Social History:  reports that she has never smoked. She has never used smokeless tobacco. She reports that she does not drink alcohol or use drugs.     Family History: Anion Gap 17 (H) 7 - 16 mmol/L    Glucose 151 (H) 74 - 99 mg/dL    BUN 12 8 - 23 mg/dL    CREATININE 0.6 0.5 - 1.0 mg/dL    GFR Non-African American >60 >=60 mL/min/1.73    GFR African American >60     Calcium 9.6 8.6 - 10.2 mg/dL   EKG 12 Lead   Result Value Ref Range    Ventricular Rate 115 BPM    Atrial Rate 345 BPM    QRS Duration 98 ms    Q-T Interval 326 ms    QTc Calculation (Bazett) 450 ms    P Axis 56 degrees    R Axis -32 degrees    T Axis 114 degrees   EKG 12 Lead   Result Value Ref Range    Ventricular Rate 106 BPM    Atrial Rate 106 BPM    P-R Interval 158 ms    QRS Duration 84 ms    Q-T Interval 354 ms    QTc Calculation (Bazett) 470 ms    P Axis 17 degrees    R Axis -35 degrees    T Axis 162 degrees       RADIOLOGY:  Interpreted by Radiologist.  XR CHEST PORTABLE    (Results Pending)   Initial reading-+ CHF    ------------------------- NURSING NOTES AND VITALS REVIEWED ---------------------------  The nursing notes within the ED encounter and vital signs as below have been reviewed. BP (!) 144/94   Pulse 105   Temp 97.3 °F (36.3 °C) (Oral)   Resp 20   Ht 5' 6\" (1.676 m)   Wt 152 lb (68.9 kg)   SpO2 94%   BMI 24.53 kg/m²   Oxygen Saturation Interpretation: Abnormal    ---------------------------------------------------PHYSICAL EXAM--------------------------------------    Constitutional/General: Alert and oriented x3, well appearing, non toxic in NAD  Head: Normocephalic and atraumatic  Eyes: PERRL, EOMI  Mouth: Oropharynx clear, handling secretions, no trismus  Neck: Supple, full ROM,   Pulmonary: Lungs clear to auscultation bilaterally, no wheezes, rales, or rhonchi. Not in respiratory distress  Cardiovascular:  Regular rate and rhythm, no murmurs, gallops, or rubs. 2+ distal pulses  Abdomen: Soft, non tender, non distended, no organomegaly, no masses no rebound tenderness or guarding no rigidity. Normal active bowel sounds. Extremities: Moves all extremities x 4.  Warm and well perfused; no calf tenderness no cords, no clinical signs of DVT no edema  Skin: warm and dry without rash  Neurologic: GCS 15, cranial nerves II through XII grossly intact with no focal deficits. No meningeal signs  Psych: Normal Affect      ------------------------------ ED COURSE/MEDICAL DECISION MAKING----------------------  Medications   furosemide (LASIX) injection 40 mg (40 mg Intravenous Given 2/25/20 0541)   albuterol (PROVENTIL) nebulizer solution 2.5 mg (2.5 mg Nebulization Given 2/25/20 0542)         ED COURSE:     Medical Decision Making:   Differential diagnoses  CHF, pneumonia, asthma, reactive airway disease, PE, atypical MI, cardiac dysrhythmia, influenza, to name a few. Patient's not had any colored sputum production hemoptysis but states she has had a recent diagnosis of \" bronchitis\" for which she was prescribed amoxicillin. Patient denies any chest heaviness/pain or pressure to suggest an MI.    EKG #1:  Interpreted by emergency department physician unless otherwise noted. Time:  04:45    Rate: 115  Rhythm: Atrial flutter. Interpretation: nonspecific ST and T waves findings, +LVH. ? New onset Aflutter compared to old EKG 2/24/2020    Counseling: The emergency provider has spoken with the patient and spouse/SO and discussed todays results, in addition to providing specific details for the plan of care and counseling regarding the diagnosis and prognosis. Questions are answered at this time and they are agreeable with the plan.    --------------------------------- IMPRESSION AND DISPOSITION ---------------------------------    IMPRESSION  1. Acute congestive heart failure, unspecified heart failure type ([facility] [address]    2. New onset atrial flutter (HCC)        DISPOSITION  Disposition:   6:05 AM  I have signed this patient out to the oncoming physician, Dr. Lizzy Maddox. I have discussed the patient's initial exam, treatment and plan of care with the on coming physician.   I have notified the patient / family of

## 2020-02-25 NOTE — DISCHARGE SUMMARY
[address]  Discharge Summary      Patient ID:  Magaly Guerrier  [id number]  85 y.o.  1950    Admit date: 2/25/2020    Discharge date and time: 2/25/20    Location of discharge: [facility]     Admitting Physician: Armand Schaumann, MD     Discharge Physician: Clovis Hughes    Consults: cardiology    Admission Diagnoses: CHF with unknown LVEF ([address] [I50.9]  CHF with unknown LVEF ([address] [I50.9]    Discharge Diagnoses: Principal Problem:    Acute respiratory failure with hypoxia ([address]  Active Problems:    CHF with unknown LVEF ([address]    Aortic valve stenosis    Dyspnea    Atrial flutter ([address]    Weight loss  Resolved Problems:    * No resolved hospital problems. *      Hospital Course: Magaly Guerrier is a very pleasant 68year old female with past medical history of aortic stenosis who presents with worsening dyspnea on exertion. She admitted to heart palpitations when laying flat which self resolves after a few seconds. She does not have any lightheadedness or dizziness with ambulation. She denies orthostatic symptoms. Interestingly, patient notes a 15-20 lbs weight loss since May 2019 due to suffering from different \"ailments\". She did establish with a new PCP Rick Evans MD on 2/24/20 who suspected her aortic valve disease was progressing and ordered an in office EKG and Chest xray which supported vascular congestion. She was prescribed Lasix to take and since she was set to see her cardiologist Dr Gen Shi tomorrow it was felt safe to follow up outpatient. In the interim, she felt exceedingly dyspneic and presented to the  ED. Was admitted for further evaluation and mgt. She was hypoxic and required 4 L of oxygen in the ED. Cardiology was consulted and ECHO was conducted. Per cardiologist (Dr. Karolina Burton) she has severe aortic stenosis with pleural effusion and will need to be transferred downtown at [facility] for further workup for possible TAVR.  Troponin in the ED was negative, she had new onset of atrial flutter but spontaneously converted back to sinus rhythm. She received lasix 40 mg IV one dose, proventil nebulizer in the ED and potassium was replaced with KCLOR 20 mEq. Post Discharge Follow Up Issues:     Discharged Condition: stable    Significant Diagnostic Studies:   XR CHEST PORTABLE   Final Result   Worsening CHF.                  Treatments: Lasix, potassium, albuterol    Disposition: transfer to [facility]    Patient Instructions:    Severiano Dial   [place] Medication Instructions [facility]:[id number]    Printed on:02/25/20 1120   Medication Information                      amoxicillin (AMOXIL) 500 MG capsule  Take 1 capsule by mouth 3 times daily for 10 days             b complex vitamins capsule  Take 1 capsule by mouth daily             brompheniramine-pseudoephedrine-DM 2-30-10 MG/5ML syrup  Take 5 mLs by mouth 4 times daily as needed for Congestion or Cough             calcium carbonate (OSCAL) 500 MG TABS tablet  Take 500 mg by mouth daily             Cod Liver Oil 1000 MG CAPS  Take by mouth             Coenzyme Q10 (CO Q-10) 100 MG CAPS  Take by mouth daily             furosemide (LASIX) 20 MG tablet  Take 1 tablet by mouth daily             Magnesium ([employer]S TRIPLE MAGNESIUM COMPLEX) 400 MG CAPS  Take by mouth             magnesium oxide (MAG-OX) 400 MG tablet  Take 400 mg by mouth daily             NONFORMULARY  Indications: hawthorn berry, soy lecithin, kelp white oak bark, lira seal root              vitamin C (ASCORBIC ACID) 500 MG tablet  Take 500 mg by mouth daily             vitamin E 1000 units capsule  Take 1,000 Units by mouth daily               Activity: activity as tolerated  Diet: cardiac diet    Lester Franklin MD  [facility]          YARITZA Jacobs - RON GALAVIZ/ [address] [place] [address]  [phone]            Signed:  Reilly Wilson  2/25/2020  11:29 AM

## 2020-02-25 NOTE — ED NOTES
Pt. Admission initiated. SBAR faxed to floor 4s. Floor called, spoke with RN Adore López, fax verified. Pt transport called. Awaiting transport, will continue to monitor pt.       Barrie Clarke RN  02/25/20 [id number]

## 2020-02-25 NOTE — H&P (VIEW-ONLY)
admission. Allergies:    Patient has no known allergies. Social History:    reports that she has never smoked. She has never used smokeless tobacco. She reports that she does not drink alcohol or use drugs. Family History:   family history includes Heart Disease in her brother, father, and sister; High Blood Pressure in her mother; No Known Problems in her brother; Other in her sister. REVIEW OF SYSTEMS:  Review of Systems   Constitutional: Positive for activity change, fatigue and unexpected weight change. Negative for appetite change, chills and fever. HENT: Negative for congestion and sinus pain. Post nasal drainage is present   Eyes: Negative for photophobia and visual disturbance. Respiratory: Positive for apnea (orthopnea ) and shortness of breath. Negative for cough, chest tightness and wheezing. Cardiovascular: Positive for palpitations (self resolving- lasting a few seconds when laying down ). Negative for chest pain and leg swelling. Gastrointestinal: Negative for abdominal pain, blood in stool, constipation, diarrhea, nausea and vomiting. Genitourinary: Negative for difficulty urinating and hematuria. Musculoskeletal: Negative for back pain and myalgias. Skin: Negative for rash and wound. Neurological: Negative for dizziness and light-headedness. Psychiatric/Behavioral: Negative for dysphoric mood and sleep disturbance. PHYSICAL EXAM:    Vitals:  BP (!) 112/90   Pulse 106   Temp 97.6 °F (36.4 °C) (Oral)   Resp 16   Ht 5' 6\" (1.676 m)   Wt 152 lb (68.9 kg)   SpO2 93%   BMI 24.53 kg/m²     General:  Awake, alert, oriented X 3. Well developed, well nourished, well groomed. No apparent distress. HEENT:  Normocephalic, atraumatic. Pupils equal, round, reactive to light. No scleral icterus. No conjunctival injection. Normal lips, teeth, and gums. No nasal discharge.   Neck:  Supple  Heart:  Harsh systolic ejection murmur, tachycardic, normal rhythm aortic valve gradient- obtain updated echocardiogram. Might be a candidate for TAVR   Consulted cardiology  Lasix IV 40 mg BID today  Monitor diuresis w strict I/Os and adjust dose   Replace potassium- started PO K 20 meq BID  Wean O2 as tolerated- currently on 4 L     Atrial flutter  New onset, likely due to heart failure   Would consider ischemic workup including stress test- defer to cards  Initial troponin is negative   Check TSH   Spontaneously reverted to sinus rhythm   [id number] of at least 3  Will need anticoagulation   This discussion needs to be had with service team- pros vs cons     Weight loss  Per chart review, patient has lost 10 lbs since June 2019  Due for multiple health maintenance including breast and colon cancer screenings  Given her normal Hg, suspect her valve disease is contributing to weight issues     Code status:full code  DVT prophylaxis:Lovenox  Diet:General    Case discussed with attending on call Dr Ila Noland MD  7:36 AM  2/25/2020      Family Medicine Attending Physician Statement    I have discussed the case, including pertinent history and exam findings with the resident. I also have seen and examined the patient. Above reviewed, confirmed. Also discussed with primary care physician, confirmed. Discussed with cardiology, severity of situation apparent. Echocardiogram showing progression of aortic valve disease now impacting systolic heart function. Patient states shortness of breath has improved substantially since being given oxygen. Denies chest pain or palpitations. No lower extremity edema orthopnea      Relevant PFSH, ROS noted and confirmed per resident note.      /60   Pulse 104   Temp 98.1 °F (36.7 °C) (Oral)   Resp 16   Ht 5' 6\" (1.676 m)   Wt 152 lb (68.9 kg)   SpO2 94%   BMI 24.53 kg/m²     Exam is as noted by resident with the following changes, additions or corrections:    Gen Alert cooperative NAD  CV: S1S2 RRR + murmur, no

## 2020-02-25 NOTE — H&P
Lungs:  CTA bilaterally, bilat symmetrical expansion, no wheeze, rales, or rhonchi  Abdomen:   Bowel sounds present, soft, nontender, no masses, no organomegaly, no peritoneal signs  Extremities:  No clubbing, cyanosis, or edema  Skin:  Warm and dry, no open lesions or rash  Neuro:  Cranial nerves 2-12 intact, no focal deficits  Breast: deferred  Rectal: deferred  Genitalia:  deferred    LABS:  Recent Results (from the past 24 hour(s))   EKG 12 Lead    Collection Time: 02/25/20  4:45 AM   Result Value Ref Range    Ventricular Rate 115 BPM    Atrial Rate 345 BPM    QRS Duration 98 ms    Q-T Interval 326 ms    QTc Calculation (Bazett) 450 ms    P Axis 56 degrees    R Axis -32 degrees    T Axis 114 degrees   Troponin    Collection Time: 02/25/20  5:30 AM   Result Value Ref Range    Troponin <0.01 0.00 - 0.03 ng/mL   Protime-INR    Collection Time: 02/25/20  5:30 AM   Result Value Ref Range    Protime 12.7 (H) 9.3 - 12.4 sec    INR 1.1    APTT    Collection Time: 02/25/20  5:30 AM   Result Value Ref Range    aPTT 32.7 24.5 - 35.1 sec   Brain Natriuretic Peptide    Collection Time: 02/25/20  5:30 AM   Result Value Ref Range    Pro-BNP 15,580 (H) 0 - 125 pg/mL   D-Dimer, Quantitative    Collection Time: 02/25/20  5:30 AM   Result Value Ref Range    D-Dimer, Quant 203 ng/mL DDU   CBC Auto Differential    Collection Time: 02/25/20  5:30 AM   Result Value Ref Range    WBC 10.7 4.5 - 11.5 E9/L    RBC 5.14 3.50 - 5.50 E12/L    Hemoglobin 13.4 11.5 - 15.5 g/dL    Hematocrit 44.1 34.0 - 48.0 %    MCV 85.8 80.0 - 99.9 fL    MCH 26.1 26.0 - 35.0 pg    MCHC 30.4 (L) 32.0 - 34.5 %    RDW 16.3 (H) 11.5 - 15.0 fL    Platelets 765 (H) 672 - 450 E9/L    MPV 10.7 7.0 - 12.0 fL    Neutrophils % 73.5 43.0 - 80.0 %    Immature Granulocytes % 0.7 0.0 - 5.0 %    Lymphocytes % 17.6 (L) 20.0 - 42.0 %    Monocytes % 6.6 2.0 - 12.0 %    Eosinophils % 0.9 0.0 - 6.0 %    Basophils % 0.7 0.0 - 2.0 %    Neutrophils Absolute 7.84 (H) 1.80 - 7.30 E9/L ectopy  Resp CTAB no wheeze  GI soft NT/ND +bs  Extr no edema    Imp   Critical aortic stenosis  Acute systolic heart failure due to the above  Paroxysmal atrial fibrillation/flutter due to dilated left atrium  Respiratory failure with hypoxia due to above    Plan  Patient to be transferred downtown to [facility] for further cardiac work-up evaluation for aortic valve replacement or repair.   Patient's questions as well as those of her  were addressed    Otherwise I agree with the resident's documented assessment and plan

## 2020-02-25 NOTE — CONSULTS
INPATIENT CARDIOLOGY CONSULT    Name: Jose De Jesus Easton    Age: 68 y.o. Date of Admission: 2020  4:31 AM    Date of Service: 2020    Reason for Consultation: Critical aortic stenosis, acute HFrEF    Referring Physician: Matt Brito MD    History of Present Illness:  Jose De Jesus Easton is a 68 y.o. female (known to Dr. Mesfin Vickers) who presented to [facility] on 2020 for further evaluation of progressive dyspnea on exertion (now occurring with mild levels of exertion over the past 2-3 weeks), LE edema, and orthopnea . Her PMH is outlined in detail below (see A/P below), and includes critical aortic stenosis (patient apparently deferred additional work-up in the past). She denies recent chest pain, palpitations, or syncope. She is currently with no acute distress at rest. SR/ST on EKG and telemetry. Review of Systems:   Cardiac: As per HPI  General: No fever, chills  Pulmonary: As per HPI  HEENT: No visual disturbances, difficult swallowing  GI: No nausea, vomiting  Endocrine: No thyroid disease or DM  Musculoskeletal: MARIO x 4, no focal motor deficits  Skin: Intact, no rashes  Neuro/Psych: No headache or seizures    Past Medical History:  Past Medical History:   Diagnosis Date    Heart murmur     Heart murmur     Since birth    Varicosities of leg      Past Surgical History:  Past Surgical History:   Procedure Laterality Date     SECTION      HIP FRACTURE SURGERY Right In her 46s. after a fall. Dr. Meek Chanel Higdon     Family History:  Family History   Problem Relation Age of Onset    High Blood Pressure Mother     Heart Disease Father     Heart Disease Brother         cabg    Heart Disease Sister     No Known Problems Brother     Other Sister         Tumor, unsure, possibly uterine .      Social History:  Social History     Socioeconomic History    Marital status:      Spouse name: Not on file    Number of children: Not on file    Years of education: Not on file    Highest capsule by mouth daily   Yes Historical Provider, MD   NONFORMULARY Indications: hawthorn berry, soy lecithin, kelp white oak bark, lira seal root    Yes Historical Provider, MD   Cod Liver Oil 1000 MG CAPS Take by mouth   Yes Historical Provider, MD     Current Medications:  Current Facility-Administered Medications   Medication Dose Route Frequency Provider Last Rate Last Dose    calcium elemental (OSCAL) tablet 500 mg  500 mg Oral Daily Tila Hayes MD   500 mg at 02/25/20 0856    acetaminophen (TYLENOL) tablet 650 mg  650 mg Oral Q6H PRN Tila Hayes MD        acetaminophen (TYLENOL) suppository 650 mg  650 mg Rectal Q6H PRN Tila Hayes MD        polyethylene glycol (GLYCOLAX) packet 17 g  17 g Oral Daily PRN Tila Hayes MD        enoxaparin (LOVENOX) injection 40 mg  40 mg Subcutaneous Daily Tila Hayes MD   40 mg at 02/25/20 0856    potassium chloride (KLOR-CON M) extended release tablet 20 mEq  20 mEq Oral BID WC Tila Hayes MD   20 mEq at 02/25/20 0856    furosemide (LASIX) injection 40 mg  40 mg Intravenous BID Tila Hayes MD        prochlorperazine (COMPAZINE) injection 10 mg  10 mg Intravenous Q6H PRN Tila Hayes MD             Physical Exam:  /60   Pulse 104   Temp 98.1 °F (36.7 °C) (Oral)   Resp 16   Ht 5' 6\" (1.676 m)   Wt 152 lb (68.9 kg)   SpO2 94%   BMI 24.53 kg/m²   Wt Readings from Last 3 Encounters:   02/25/20 152 lb (68.9 kg)   02/24/20 156 lb (70.8 kg)   02/20/20 155 lb (70.3 kg)     Appearance: Awake, alert, no acute respiratory distress  Skin: Intact, no rash  Head: Normocephalic, atraumatic  Eyes: EOMI, no conjunctival erythema  ENMT: No pharyngeal erythema, MMM, no rhinorrhea  Neck: Supple, no carotid bruits  Lungs: Decreased BS B/L, no wheezing  Cardiac: Regular rate and rhythm, 3/6 ZAC > RUSB  Abdomen: Soft, nontender, +bowel sounds  Extremities: Moves all extremities x 4, +lower extremity edema  Neurologic: No focal motor deficits apparent, effusions  4. Moderate LVH  5. Mild pulmonary HTN  6. Small pericardial effusion    - Images from 6/2017 echocardiogram personally reviewed (normal EF and critical AS with AV peak velocity > 5 m/s and AV mean gradient > 60 mmHg --> patient apparently deferred work-up at that time d/t lack of symptoms). Echocardiogram this admission confirms critical aortic stenosis and now with decline in EF and bilateral pleural effusions.  - Continue with IV diuresis -- monitor hemodynamics, renal function, electrolytes, and I/O's closely  - Eventual institution of GDMT  - Discussed with patient re: natural course of critical aortic stenosis and treatment options (including TAVR) --> will transfer to [facility] to initiate TAVR work-up (CT scans, cardiac catheterization, etc)  - Case discussed with the patient, her , and FP team today      Thank you for allowing me to participate in your patient's care. Please feel free to contact me if you have any questions or concerns.     Ismael Eduardo MD  [facility]) Cardiology

## 2020-02-26 ENCOUNTER — APPOINTMENT (OUTPATIENT)
Dept: CT IMAGING | Age: 70
DRG: 286 | End: 2020-02-26
Attending: INTERNAL MEDICINE

## 2020-02-26 ENCOUNTER — TELEPHONE (OUTPATIENT)
Dept: FAMILY MEDICINE CLINIC | Age: 70
End: 2020-02-26

## 2020-02-26 ENCOUNTER — APPOINTMENT (OUTPATIENT)
Dept: INTERVENTIONAL RADIOLOGY/VASCULAR | Age: 70
DRG: 286 | End: 2020-02-26
Attending: INTERNAL MEDICINE

## 2020-02-26 ENCOUNTER — APPOINTMENT (OUTPATIENT)
Dept: ULTRASOUND IMAGING | Age: 70
DRG: 286 | End: 2020-02-26
Attending: INTERNAL MEDICINE

## 2020-02-26 LAB
ANION GAP SERPL CALCULATED.3IONS-SCNC: 18 MMOL/L (ref 7–16)
BASOPHILS ABSOLUTE: 0.09 E9/L (ref 0–0.2)
BASOPHILS RELATIVE PERCENT: 1 % (ref 0–2)
BUN BLDV-MCNC: 11 MG/DL (ref 8–23)
CALCIUM SERPL-MCNC: 9 MG/DL (ref 8.6–10.2)
CHLORIDE BLD-SCNC: 97 MMOL/L (ref 98–107)
CO2: 23 MMOL/L (ref 22–29)
CREAT SERPL-MCNC: 0.6 MG/DL (ref 0.5–1)
EOSINOPHILS ABSOLUTE: 0.19 E9/L (ref 0.05–0.5)
EOSINOPHILS RELATIVE PERCENT: 2.2 % (ref 0–6)
GFR AFRICAN AMERICAN: >60
GFR NON-AFRICAN AMERICAN: >60 ML/MIN/1.73
GLUCOSE BLD-MCNC: 110 MG/DL (ref 74–99)
HCT VFR BLD CALC: 39.3 % (ref 34–48)
HEMOGLOBIN: 12.1 G/DL (ref 11.5–15.5)
IMMATURE GRANULOCYTES #: 0.03 E9/L
IMMATURE GRANULOCYTES %: 0.3 % (ref 0–5)
LYMPHOCYTES ABSOLUTE: 1.91 E9/L (ref 1.5–4)
LYMPHOCYTES RELATIVE PERCENT: 22.2 % (ref 20–42)
MAGNESIUM: 1.9 MG/DL (ref 1.6–2.6)
MCH RBC QN AUTO: 25.8 PG (ref 26–35)
MCHC RBC AUTO-ENTMCNC: 30.8 % (ref 32–34.5)
MCV RBC AUTO: 83.8 FL (ref 80–99.9)
MONOCYTES ABSOLUTE: 0.76 E9/L (ref 0.1–0.95)
MONOCYTES RELATIVE PERCENT: 8.8 % (ref 2–12)
NEUTROPHILS ABSOLUTE: 5.62 E9/L (ref 1.8–7.3)
NEUTROPHILS RELATIVE PERCENT: 65.5 % (ref 43–80)
PDW BLD-RTO: 15.9 FL (ref 11.5–15)
PLATELET # BLD: 410 E9/L (ref 130–450)
PMV BLD AUTO: 9.9 FL (ref 7–12)
POTASSIUM SERPL-SCNC: 3.8 MMOL/L (ref 3.5–5)
RBC # BLD: 4.69 E12/L (ref 3.5–5.5)
SODIUM BLD-SCNC: 138 MMOL/L (ref 132–146)
WBC # BLD: 8.6 E9/L (ref 4.5–11.5)

## 2020-02-26 PROCEDURE — 2580000003 HC RX 258: Performed by: HOSPITALIST

## 2020-02-26 PROCEDURE — 2700000000 HC OXYGEN THERAPY PER DAY

## 2020-02-26 PROCEDURE — 93880 EXTRACRANIAL BILAT STUDY: CPT

## 2020-02-26 PROCEDURE — 71275 CT ANGIOGRAPHY CHEST: CPT

## 2020-02-26 PROCEDURE — 2060000000 HC ICU INTERMEDIATE R&B

## 2020-02-26 PROCEDURE — 83735 ASSAY OF MAGNESIUM: CPT

## 2020-02-26 PROCEDURE — 6360000004 HC RX CONTRAST MEDICATION: Performed by: RADIOLOGY

## 2020-02-26 PROCEDURE — 6370000000 HC RX 637 (ALT 250 FOR IP): Performed by: INTERNAL MEDICINE

## 2020-02-26 PROCEDURE — 93922 UPR/L XTREMITY ART 2 LEVELS: CPT

## 2020-02-26 PROCEDURE — 2580000003 HC RX 258: Performed by: RADIOLOGY

## 2020-02-26 PROCEDURE — 36415 COLL VENOUS BLD VENIPUNCTURE: CPT

## 2020-02-26 PROCEDURE — 75572 CT HRT W/3D IMAGE: CPT

## 2020-02-26 PROCEDURE — 85025 COMPLETE CBC W/AUTO DIFF WBC: CPT

## 2020-02-26 PROCEDURE — 74174 CTA ABD&PLVS W/CONTRAST: CPT

## 2020-02-26 PROCEDURE — 6360000002 HC RX W HCPCS: Performed by: INTERNAL MEDICINE

## 2020-02-26 PROCEDURE — 80048 BASIC METABOLIC PNL TOTAL CA: CPT

## 2020-02-26 PROCEDURE — 94729 DIFFUSING CAPACITY: CPT

## 2020-02-26 PROCEDURE — 6360000002 HC RX W HCPCS: Performed by: HOSPITALIST

## 2020-02-26 PROCEDURE — 99233 SBSQ HOSP IP/OBS HIGH 50: CPT | Performed by: INTERNAL MEDICINE

## 2020-02-26 PROCEDURE — 94375 RESPIRATORY FLOW VOLUME LOOP: CPT

## 2020-02-26 PROCEDURE — 99254 IP/OBS CNSLTJ NEW/EST MOD 60: CPT | Performed by: THORACIC SURGERY (CARDIOTHORACIC VASCULAR SURGERY)

## 2020-02-26 RX ORDER — POTASSIUM CHLORIDE 20 MEQ/1
20 TABLET, EXTENDED RELEASE ORAL 2 TIMES DAILY WITH MEALS
Status: DISCONTINUED | OUTPATIENT
Start: 2020-02-26 | End: 2020-02-28

## 2020-02-26 RX ORDER — FUROSEMIDE 10 MG/ML
40 INJECTION INTRAMUSCULAR; INTRAVENOUS 2 TIMES DAILY
Status: DISCONTINUED | OUTPATIENT
Start: 2020-02-26 | End: 2020-02-28

## 2020-02-26 RX ORDER — SODIUM CHLORIDE 0.9 % (FLUSH) 0.9 %
10 SYRINGE (ML) INJECTION PRN
Status: DISCONTINUED | OUTPATIENT
Start: 2020-02-26 | End: 2020-02-28 | Stop reason: HOSPADM

## 2020-02-26 RX ORDER — METOPROLOL SUCCINATE 25 MG/1
25 TABLET, EXTENDED RELEASE ORAL DAILY
Status: DISCONTINUED | OUTPATIENT
Start: 2020-02-26 | End: 2020-02-28 | Stop reason: HOSPADM

## 2020-02-26 RX ADMIN — POTASSIUM CHLORIDE 20 MEQ: 1500 TABLET, EXTENDED RELEASE ORAL at 17:52

## 2020-02-26 RX ADMIN — ENOXAPARIN SODIUM 40 MG: 40 INJECTION SUBCUTANEOUS at 09:15

## 2020-02-26 RX ADMIN — FUROSEMIDE 40 MG: 10 INJECTION, SOLUTION INTRAMUSCULAR; INTRAVENOUS at 09:15

## 2020-02-26 RX ADMIN — SODIUM CHLORIDE, PRESERVATIVE FREE 10 ML: 5 INJECTION INTRAVENOUS at 09:26

## 2020-02-26 RX ADMIN — FUROSEMIDE 40 MG: 10 INJECTION, SOLUTION INTRAMUSCULAR; INTRAVENOUS at 17:52

## 2020-02-26 RX ADMIN — IOPAMIDOL 80 ML: 755 INJECTION, SOLUTION INTRAVENOUS at 19:30

## 2020-02-26 RX ADMIN — METOPROLOL SUCCINATE 25 MG: 25 TABLET, EXTENDED RELEASE ORAL at 15:51

## 2020-02-26 RX ADMIN — SODIUM CHLORIDE, PRESERVATIVE FREE 10 ML: 5 INJECTION INTRAVENOUS at 21:31

## 2020-02-26 RX ADMIN — Medication 10 ML: at 19:01

## 2020-02-26 RX ADMIN — IOPAMIDOL 100 ML: 755 INJECTION, SOLUTION INTRAVENOUS at 19:01

## 2020-02-26 ASSESSMENT — PAIN SCALES - GENERAL
PAINLEVEL_OUTOF10: 0
PAINLEVEL_OUTOF10: 0

## 2020-02-26 NOTE — CONSULTS
CTS Consult    Patient name: Julius August    Reason for consult: Critical AS    Referring Physician: Dr. Gab Rosen    Primary Care Physician: Tracey De La O MD    Date of service: 2020    Chief Complaint: ALVARENGA    HPI: 68year old with known critical AS who presented to [facility] with progressive ALVARENGA. She states this has gotten worse and now occurs with minimal activity. She has documented critical AS and was previously asymptomatic. She denies CP, palpitations, orthopnea, PND, LE edema, dizziness or syncope. She states the ALVARENGA gets better with rest.    Allergies: No Known Allergies    Home medications:    Current Facility-Administered Medications   Medication Dose Route Frequency Provider Last Rate Last Dose    sodium chloride flush 0.9 % injection 10 mL  10 mL Intravenous 2 times per day Caitie Schuster MD   10 mL at 20 0926    sodium chloride flush 0.9 % injection 10 mL  10 mL Intravenous PRN Caitie Schuster MD        acetaminophen (TYLENOL) tablet 650 mg  650 mg Oral Q6H PRN Caitie Meraz MD        acetaminophen (TYLENOL) suppository 650 mg  650 mg Rectal Q6H PRN Caitie Meraz MD        polyethylene glycol (GLYCOLAX) packet 17 g  17 g Oral Daily PRN Caitie Meraz MD        promethazine (PHENERGAN) tablet 12.5 mg  12.5 mg Oral Q6H PRN Caitie Meraz MD        ondansetron (ZOFRAN) injection 4 mg  4 mg Intravenous Q6H PRN Caitie Meraz MD        enoxaparin (LOVENOX) injection 40 mg  40 mg Subcutaneous Daily Caitie Meraz MD   40 mg at 20 0915    furosemide (LASIX) injection 40 mg  40 mg Intravenous Daily Caitie Meraz MD   40 mg at 20 5780       Past Medical History:  Past Medical History:   Diagnosis Date    Heart murmur     Heart murmur     Since birth    Varicosities of leg        Past Surgical History:  Past Surgical History:   Procedure Laterality Date     SECTION      HIP FRACTURE SURGERY Right In her 46s.  after a fall[facility]       Social History:  Social History     Socioeconomic History    Marital status:      Spouse name: Not on file    Number of children: Not on file    Years of education: Not on file    Highest education level: Not on file   Occupational History    Not on file   Social Needs    Financial resource strain: Not on file    Food insecurity:     Worry: Not on file     Inability: Not on file    Transportation needs:     Medical: Not on file     Non-medical: Not on file   Tobacco Use    Smoking status: Never Smoker    Smokeless tobacco: Never Used   Substance and Sexual Activity    Alcohol use: No    Drug use: No    Sexual activity: Not on file   Lifestyle    Physical activity:     Days per week: Not on file     Minutes per session: Not on file    Stress: Not on file   Relationships    Social connections:     Talks on phone: Not on file     Gets together: Not on file     Attends Spiritism service: Not on file     Active member of club or organization: Not on file     Attends meetings of clubs or organizations: Not on file     Relationship status: Not on file    Intimate partner violence:     Fear of current or ex partner: Not on file     Emotionally abused: Not on file     Physically abused: Not on file     Forced sexual activity: Not on file   Other Topics Concern    Not on file   Social History Narrative    Has been a missionary. [facility]       Family History:  Family History   Problem Relation Age of Onset    High Blood Pressure Mother     Heart Disease Father     Heart Disease Brother         cabg    Heart Disease Sister     No Known Problems Brother     Other Sister         Tumor, unsure, possibly uterine . Review of Systems:  Constitutional: Denies fevers, chills, or weight loss. HEENT: Denies visual changes or hearing loss. Heart: As per HPI. Lungs: Denies shortness of breath, cough, or wheezing.    Gastrointestinal: Denies nausea, vomiting,

## 2020-02-26 NOTE — CONSULTS
ConsultationNote    Patient's Name/Date of Birth: Mia Simms /  (15 y.o.)    Date: 2020     Reason for Consult:  Valve clearance surgery    Requesting Physician:  Alden Diamond MD    HPI  Reviewed from consulting practitioner's note    Past Medical History:   Diagnosis Date    Heart murmur     Heart murmur     Since birth    Varicosities of leg        Past Surgical History:   Procedure Laterality Date     SECTION      HIP FRACTURE SURGERY Right In her 46s. after a fall. Dr. Jerica Bailey       Prior to Admission medications    Medication Sig Start Date End Date Taking? Authorizing Provider   magnesium oxide (MAG-OX) 400 MG tablet Take 400 mg by mouth daily    Historical Provider, MD   calcium carbonate (OSCAL) 500 MG TABS tablet Take 500 mg by mouth daily    Historical Provider, MD   furosemide (LASIX) 20 MG tablet Take 1 tablet by mouth daily 20   Patti Hines MD   vitamin E 1000 units capsule Take 1,000 Units by mouth daily    Historical Provider, MD   Coenzyme Q10 (CO Q-10) 100 MG CAPS Take by mouth daily    Historical Provider, MD   vitamin C (ASCORBIC ACID) 500 MG tablet Take 500 mg by mouth daily    Historical Provider, MD   b complex vitamins capsule Take 1 capsule by mouth daily    Historical Provider, MD   NONFORMULARY Indications: hawthorn berry, soy lecithin, kelp white oak bark, lira seal root     Historical Provider, MD   Cod Liver Oil 1000 MG CAPS Take by mouth    Historical Provider, MD       No Known Allergies    Family History   Problem Relation Age of Onset    High Blood Pressure Mother     Heart Disease Father     Heart Disease Brother         cabg    Heart Disease Sister     No Known Problems Brother     Other Sister         Tumor, unsure, possibly uterine .        Social History     Socioeconomic History    Marital status:      Spouse name: Not on file    Number of children: Not on file    Years of education: Not on file #8 presents with a defective DL composite restoration. #20 presents with defective composite restoration. #20 appears to have periapical radiolucency. Not tender to percussion. Teeth #20, 21, and 22 test normal to ice: 3-4 seconds. All probing depths between the depths of 1-3 mm. No signs of active infection. Patient informed of clinical findings and encouraged to follow up with regular dentist after surgery. Patient is cleared from a dental standpoint. Nitrile gloves worn throughout. Electronically signed by Parley Cheadle, DDS on 2/26/20 at 3:47 PM    I personally evaluated the patient and agree with treatment plan.   Gil Odom DDS

## 2020-02-26 NOTE — PROGRESS NOTES
Inpatient Cardiology Progress note     PATIENT IS BEING FOLLOWED FOR: critical AS. CHF    Rasta Faye is a 68 y.o. female known to Dr. Gonzalez Sin: + SOB. No CP. No dizziness / lightheadedness  OBJECTIVE: No apparent distress     ROS:  Consist: Denies fevers, chills or night sweats  Heart: Denies chest pain, palpitations, lightheadedness, dizziness or syncope  Lungs: +SOB, + cough. Denies wheezing  GI: Denies abdominal pain, vomiting or diarrhea    PHYSICAL EXAM:   /60   Pulse 100   Temp 97.3 °F (36.3 °C) (Temporal)   Resp 18   Ht 5' 6\" (1.676 m)   Wt 146 lb (66.2 kg)   SpO2 93%   BMI 23.57 kg/m²    B/P Range last 24 hours: Systolic (38VVE), TROY:125 , Min:103 , SSK:321    Diastolic (03QMH), SAMANTHA:69, Min:60, Max:78    CONST: Well developed, well nourished who appears of stated age. Awake, alert and cooperative. No apparent distress  HEENT:   Head- Normocephalic, atraumatic   Eyes- Conjunctivae pink, anicteric  Throat- Oral mucosa pink and moist  Neck-  No stridor, trachea midline, no jugular venous distention. No carotid bruit  CHEST: Chest symmetrical and non-tender to palpation. No accessory muscle use or intercostal retractions  RESPIRATORY:  Lung sounds - diminished breath sounds in the right lower lung fields with crackles. Crackles left base  CARDIOVASCULAR:     Heart Inspection- shows no noted pulsations  Heart Palpation- no heaves or thrills; PMI is non-displaced   Heart Ausculation- Regular rate and rhythm. III/VI SM all over the precordium. No s3, s4 or rub   PV: No significant lower extremity edema. No varicosities. Pedal pulses palpable, no clubbing or cyanosis   ABDOMEN: Soft, non-tender to light palpation. Bowel sounds present. No palpable masses no organomegaly; no abdominal bruit  MS: Good muscle strength and tone. No atrophy or abnormal movements. : Deferred  SKIN: Warm and dry no statis dermatitis or ulcers   NEURO / PSYCH: Oriented to person, place and time.  Speech

## 2020-02-27 ENCOUNTER — APPOINTMENT (OUTPATIENT)
Dept: INTERVENTIONAL RADIOLOGY/VASCULAR | Age: 70
DRG: 286 | End: 2020-02-27
Attending: INTERNAL MEDICINE

## 2020-02-27 ENCOUNTER — APPOINTMENT (OUTPATIENT)
Dept: GENERAL RADIOLOGY | Age: 70
DRG: 286 | End: 2020-02-27
Attending: INTERNAL MEDICINE

## 2020-02-27 LAB
ANION GAP SERPL CALCULATED.3IONS-SCNC: 17 MMOL/L (ref 7–16)
APPEARANCE FLUID: NORMAL
BUN BLDV-MCNC: 16 MG/DL (ref 8–23)
CALCIUM SERPL-MCNC: 9.4 MG/DL (ref 8.6–10.2)
CELL COUNT FLUID TYPE: NORMAL
CHLORIDE BLD-SCNC: 95 MMOL/L (ref 98–107)
CO2: 26 MMOL/L (ref 22–29)
COLOR FLUID: YELLOW
CREAT SERPL-MCNC: 0.7 MG/DL (ref 0.5–1)
FLUID TYPE: NORMAL
GFR AFRICAN AMERICAN: >60
GFR NON-AFRICAN AMERICAN: >60 ML/MIN/1.73
GLUCOSE BLD-MCNC: 114 MG/DL (ref 74–99)
GLUCOSE, FLUID: 123 MG/DL
LACTATE DEHYDROGENASE: 252 U/L (ref 135–214)
MAGNESIUM: 2.1 MG/DL (ref 1.6–2.6)
MONOCYTE, FLUID: 94 %
NEUTROPHIL, FLUID: 6 %
NUCLEATED CELLS FLUID: 566 /UL
PH, BODY FLUID: 7.73
POTASSIUM SERPL-SCNC: 3.9 MMOL/L (ref 3.5–5)
PROTEIN FLUID: 2.9 G/DL
RBC FLUID: NORMAL /UL
SODIUM BLD-SCNC: 138 MMOL/L (ref 132–146)

## 2020-02-27 PROCEDURE — 36415 COLL VENOUS BLD VENIPUNCTURE: CPT

## 2020-02-27 PROCEDURE — 99233 SBSQ HOSP IP/OBS HIGH 50: CPT | Performed by: INTERNAL MEDICINE

## 2020-02-27 PROCEDURE — 87070 CULTURE OTHR SPECIMN AEROBIC: CPT

## 2020-02-27 PROCEDURE — 89051 BODY FLUID CELL COUNT: CPT

## 2020-02-27 PROCEDURE — 87205 SMEAR GRAM STAIN: CPT

## 2020-02-27 PROCEDURE — 87015 SPECIMEN INFECT AGNT CONCNTJ: CPT

## 2020-02-27 PROCEDURE — 83735 ASSAY OF MAGNESIUM: CPT

## 2020-02-27 PROCEDURE — 82947 ASSAY GLUCOSE BLOOD QUANT: CPT

## 2020-02-27 PROCEDURE — 0W993ZZ DRAINAGE OF RIGHT PLEURAL CAVITY, PERCUTANEOUS APPROACH: ICD-10-PCS | Performed by: RADIOLOGY

## 2020-02-27 PROCEDURE — 87116 MYCOBACTERIA CULTURE: CPT

## 2020-02-27 PROCEDURE — 80048 BASIC METABOLIC PNL TOTAL CA: CPT

## 2020-02-27 PROCEDURE — 6360000002 HC RX W HCPCS: Performed by: INTERNAL MEDICINE

## 2020-02-27 PROCEDURE — 84157 ASSAY OF PROTEIN OTHER: CPT

## 2020-02-27 PROCEDURE — 83615 LACTATE (LD) (LDH) ENZYME: CPT

## 2020-02-27 PROCEDURE — 2060000000 HC ICU INTERMEDIATE R&B

## 2020-02-27 PROCEDURE — 87206 SMEAR FLUORESCENT/ACID STAI: CPT

## 2020-02-27 PROCEDURE — 88305 TISSUE EXAM BY PATHOLOGIST: CPT

## 2020-02-27 PROCEDURE — 6360000002 HC RX W HCPCS: Performed by: HOSPITALIST

## 2020-02-27 PROCEDURE — C1729 CATH, DRAINAGE: HCPCS

## 2020-02-27 PROCEDURE — 83986 ASSAY PH BODY FLUID NOS: CPT

## 2020-02-27 PROCEDURE — 32555 ASPIRATE PLEURA W/ IMAGING: CPT

## 2020-02-27 PROCEDURE — 2580000003 HC RX 258: Performed by: HOSPITALIST

## 2020-02-27 PROCEDURE — 88112 CYTOPATH CELL ENHANCE TECH: CPT

## 2020-02-27 PROCEDURE — 6370000000 HC RX 637 (ALT 250 FOR IP): Performed by: INTERNAL MEDICINE

## 2020-02-27 PROCEDURE — 71045 X-RAY EXAM CHEST 1 VIEW: CPT

## 2020-02-27 PROCEDURE — 2500000003 HC RX 250 WO HCPCS: Performed by: PHYSICIAN ASSISTANT

## 2020-02-27 RX ORDER — LIDOCAINE HYDROCHLORIDE 20 MG/ML
INJECTION, SOLUTION INFILTRATION; PERINEURAL
Status: COMPLETED | OUTPATIENT
Start: 2020-02-27 | End: 2020-02-27

## 2020-02-27 RX ADMIN — LIDOCAINE HYDROCHLORIDE 8 ML: 20 INJECTION, SOLUTION INFILTRATION; PERINEURAL at 13:56

## 2020-02-27 RX ADMIN — FUROSEMIDE 40 MG: 10 INJECTION, SOLUTION INTRAMUSCULAR; INTRAVENOUS at 18:01

## 2020-02-27 RX ADMIN — ENOXAPARIN SODIUM 40 MG: 40 INJECTION SUBCUTANEOUS at 09:03

## 2020-02-27 RX ADMIN — METOPROLOL SUCCINATE 25 MG: 25 TABLET, EXTENDED RELEASE ORAL at 09:01

## 2020-02-27 RX ADMIN — POTASSIUM CHLORIDE 20 MEQ: 1500 TABLET, EXTENDED RELEASE ORAL at 18:01

## 2020-02-27 RX ADMIN — POTASSIUM CHLORIDE 20 MEQ: 1500 TABLET, EXTENDED RELEASE ORAL at 09:01

## 2020-02-27 RX ADMIN — FUROSEMIDE 40 MG: 10 INJECTION, SOLUTION INTRAMUSCULAR; INTRAVENOUS at 09:02

## 2020-02-27 RX ADMIN — SODIUM CHLORIDE, PRESERVATIVE FREE 10 ML: 5 INJECTION INTRAVENOUS at 09:02

## 2020-02-27 RX ADMIN — SODIUM CHLORIDE, PRESERVATIVE FREE 10 ML: 5 INJECTION INTRAVENOUS at 20:44

## 2020-02-27 ASSESSMENT — PAIN SCALES - GENERAL
PAINLEVEL_OUTOF10: 0

## 2020-02-27 ASSESSMENT — PAIN - FUNCTIONAL ASSESSMENT: PAIN_FUNCTIONAL_ASSESSMENT: 0-10

## 2020-02-27 NOTE — INTERVAL H&P NOTE
H&P Update    Patient's History and Physical  was reviewed. The patient appears likely to able to tolerate the procedure, image-guided right and left thoracentesis. Risk and benefits discussed including ultimate complications, possibly death and consent obtained.     Belinda Hernandez PA-C

## 2020-02-27 NOTE — PROGRESS NOTES
Clarified with Dr Thien Schulz to see if he wanted pleural fluid cx'd, also updated pt could not get L side done today. Dr Thien Schulz to place orders in Epic.  Angio notified  Jabari Schaefer RN  3:04 PM

## 2020-02-27 NOTE — BRIEF OP NOTE
Brief Postoperative Note    Jessica Spence  YOB: 1950  [id number]    Pre-operative Diagnosis: Right Pleural Effusion    Post-operative Diagnosis: Same    Procedure: Right Thoracentesis    Anesthesia: Local    Estimated Blood Loss: < 10 cc    Performed by: Nelida Powell PA-C under indirect supervision by Angel Haas.  Wilber Angel MD.    Complications: none    Specimen obtained: Yes, fluid, serous    Findings: fluid, drained with catheter drainage      ROBERT DENNY   2/27/2020 1:40 PM

## 2020-02-27 NOTE — PROGRESS NOTES
Portable chest xray post right lung thoracentesis reviewed by Chris Waldron PA-C: no pneumothorax noted. Because of the amount of fluid removed and low BP post procedure we will not perform the left sided thoracentesis today.

## 2020-02-27 NOTE — PROGRESS NOTES
Inpatient Cardiology Progress note     PATIENT IS BEING FOLLOWED FOR: critical AS. CHF    Carlos Reyna is a 68 y.o. female known to Dr. Mcdonald Opitz: SOB significantly improved . No CP. No dizziness / lightheadedness  OBJECTIVE: No apparent distress. Had R sided thoracocentesis earlier today    ROS:  Consist: Denies fevers, chills or night sweats  Heart: Denies chest pain, palpitations, lightheadedness, dizziness or syncope  Lungs: Denies SOB, cough or wheezing  GI: Denies abdominal pain, vomiting or diarrhea    PHYSICAL EXAM:   BP (!) 103/58   Pulse 84   Temp 97.4 °F (36.3 °C)   Resp 20   Ht 5' 6\" (1.676 m)   Wt 144 lb (65.3 kg)   SpO2 95%   BMI 23.24 kg/m²    B/P Range last 24 hours: Systolic (70FAP), CHB:228 , Min:80 , DZS:129    Diastolic (79TXG), HSD:02, Min:48, Max:68    CONST: Well developed, well nourished elderly female who appears of stated age. Awake, alert and cooperative. No apparent distress  HEENT:   Head- Normocephalic, atraumatic   Eyes- Conjunctivae pink, anicteric  Throat- Oral mucosa pink and moist  Neck-  No stridor, trachea midline, no jugular venous distention. No carotid bruit  CHEST: Chest symmetrical and non-tender to palpation. No accessory muscle use or intercostal retractions  RESPIRATORY:  Lung sounds - coarse crackles in the right base  with diminished breath sounds left base with crackles. CARDIOVASCULAR:     Heart Inspection- shows no noted pulsations  Heart Palpation- no heaves or thrills; PMI is non-displaced   Heart Ausculation- Regular rate and rhythm. III/VI SM all over the precordium. No s3, s4 or rub   PV: No significant lower extremity edema. No varicosities. Pedal pulses palpable, no clubbing or cyanosis   ABDOMEN: Soft, non-tender to light palpation. Bowel sounds present. No palpable masses no organomegaly; no abdominal bruit  MS: Good muscle strength and tone. No atrophy or abnormal movements.    : Deferred  SKIN: Warm and dry no statis dermatitis

## 2020-02-27 NOTE — PROGRESS NOTES
AST, ALT, BILIDIR, BILITOT, ALKPHOS in the last 72 hours. Recent Labs     02/25/20  0530   INR 1.1     Recent Labs     02/25/20  0530   TROPONINI <0.01       Radiology:  XR CHEST PORTABLE   Final Result   Essentially resolved right pleural effusion status post thoracentesis. No pneumothorax. Hyperexpanded right lung. Persistent moderate left pleural effusion and associated atelectasis. CTA TRANSCATHETER AORTIC VALVE REPLACEMENT   Final Result      1. CT performed for preoperative TAVR planning. 2. Aortic valve calcifications. 3. Moderate to large bilateral pleural effusions with atelectasis   and/or pneumonia in the lung bases including right middle lobe and   lingula. 4. Mild tortuosity involving right common iliac artery. 5. Moderate tortuosity involving distal left subclavian artery and   left axillary artery. 6. Ascending thoracic aortic aneurysm measures up to 4 cm. CTA ABDOMEN PELVIS W CONTRAST   Final Result      1. CT performed for preoperative TAVR planning. 2. Aortic valve calcifications. 3. Moderate to large bilateral pleural effusions with atelectasis   and/or pneumonia in the lung bases including right middle lobe and   lingula. 4. Mild tortuosity involving right common iliac artery. 5. Moderate tortuosity involving distal left subclavian artery and   left axillary artery. 6. Ascending thoracic aortic aneurysm measures up to 4 cm. CTA CHEST W CONTRAST   Final Result      1. CT performed for preoperative TAVR planning. 2. Aortic valve calcifications. 3. Moderate to large bilateral pleural effusions with atelectasis   and/or pneumonia in the lung bases including right middle lobe and   lingula. 4. Mild tortuosity involving right common iliac artery. 5. Moderate tortuosity involving distal left subclavian artery and   left axillary artery. 6. Ascending thoracic aortic aneurysm measures up to 4 cm.       [address] [facility] [address]

## 2020-02-27 NOTE — PROGRESS NOTES
I spoke telephonically with the patient's floor nurse, \"Lesley\", AMAN Re: planned bilateral thoracentesis today. I informed her to make the patient NPO now with the plan to do procedure after 1300. She verbalized understanding.

## 2020-02-27 NOTE — PROGRESS NOTES
Nutrition Education    Type and Reason for Visit: Consult, Patient Education    Nutrition Assessment:  Diet education completed on heart healthy/cardiac/low sodium diet     Provided educational handouts     at bedside. Pt states that she cans and freezes her own food. 10 minutes spent counseling pt. · Verbally reviewed information with patient and   · Written educational materials provided. · Contact name and number provided. · Refer to Patient Education activity for more details.     Electronically signed by Bebo Baca RD, JOSE CARLOS on 2/27/20 at 11:51 AM    Contact Number: [phone]

## 2020-02-28 ENCOUNTER — APPOINTMENT (OUTPATIENT)
Dept: GENERAL RADIOLOGY | Age: 70
DRG: 286 | End: 2020-02-28
Attending: INTERNAL MEDICINE

## 2020-02-28 ENCOUNTER — APPOINTMENT (OUTPATIENT)
Dept: INTERVENTIONAL RADIOLOGY/VASCULAR | Age: 70
DRG: 286 | End: 2020-02-28
Attending: INTERNAL MEDICINE

## 2020-02-28 VITALS
WEIGHT: 138 LBS | TEMPERATURE: 97.4 F | HEART RATE: 74 BPM | OXYGEN SATURATION: 97 % | SYSTOLIC BLOOD PRESSURE: 96 MMHG | HEIGHT: 66 IN | BODY MASS INDEX: 22.18 KG/M2 | DIASTOLIC BLOOD PRESSURE: 54 MMHG | RESPIRATION RATE: 16 BRPM

## 2020-02-28 LAB
ANION GAP SERPL CALCULATED.3IONS-SCNC: 15 MMOL/L (ref 7–16)
APPEARANCE FLUID: NORMAL
BUN BLDV-MCNC: 25 MG/DL (ref 8–23)
CALCIUM SERPL-MCNC: 9 MG/DL (ref 8.6–10.2)
CELL COUNT FLUID TYPE: NORMAL
CHLORIDE BLD-SCNC: 96 MMOL/L (ref 98–107)
CO2: 28 MMOL/L (ref 22–29)
COLOR FLUID: NORMAL
CREAT SERPL-MCNC: 0.7 MG/DL (ref 0.5–1)
FLUID TYPE: NORMAL
GFR AFRICAN AMERICAN: >60
GFR NON-AFRICAN AMERICAN: >60 ML/MIN/1.73
GLUCOSE BLD-MCNC: 103 MG/DL (ref 74–99)
GLUCOSE, FLUID: 119 MG/DL
GRAM STAIN ORDERABLE: NORMAL
LD, FLUID: 102 U/L
LD, FLUID: 99 U/L
MAGNESIUM: 2.1 MG/DL (ref 1.6–2.6)
MONOCYTE, FLUID: 97 %
NEUTROPHIL, FLUID: 3 %
NUCLEATED CELLS FLUID: 2111 /UL
PH, BODY FLUID: 7.83
POTASSIUM SERPL-SCNC: 3.8 MMOL/L (ref 3.5–5)
PROTEIN FLUID: 3.6 G/DL
RBC FLUID: NORMAL /UL
SODIUM BLD-SCNC: 139 MMOL/L (ref 132–146)

## 2020-02-28 PROCEDURE — 93458 L HRT ARTERY/VENTRICLE ANGIO: CPT | Performed by: INTERNAL MEDICINE

## 2020-02-28 PROCEDURE — B2111ZZ FLUOROSCOPY OF MULTIPLE CORONARY ARTERIES USING LOW OSMOLAR CONTRAST: ICD-10-PCS | Performed by: INTERNAL MEDICINE

## 2020-02-28 PROCEDURE — 99024 POSTOP FOLLOW-UP VISIT: CPT | Performed by: INTERNAL MEDICINE

## 2020-02-28 PROCEDURE — 93454 CORONARY ARTERY ANGIO S&I: CPT | Performed by: INTERNAL MEDICINE

## 2020-02-28 PROCEDURE — 32554 ASPIRATE PLEURA W/O IMAGING: CPT

## 2020-02-28 PROCEDURE — 82947 ASSAY GLUCOSE BLOOD QUANT: CPT

## 2020-02-28 PROCEDURE — 83615 LACTATE (LD) (LDH) ENZYME: CPT

## 2020-02-28 PROCEDURE — 89051 BODY FLUID CELL COUNT: CPT

## 2020-02-28 PROCEDURE — 84157 ASSAY OF PROTEIN OTHER: CPT

## 2020-02-28 PROCEDURE — 2500000003 HC RX 250 WO HCPCS

## 2020-02-28 PROCEDURE — 2709999900 HC NON-CHARGEABLE SUPPLY

## 2020-02-28 PROCEDURE — 36415 COLL VENOUS BLD VENIPUNCTURE: CPT

## 2020-02-28 PROCEDURE — 71045 X-RAY EXAM CHEST 1 VIEW: CPT

## 2020-02-28 PROCEDURE — 94010 BREATHING CAPACITY TEST: CPT | Performed by: INTERNAL MEDICINE

## 2020-02-28 PROCEDURE — 94729 DIFFUSING CAPACITY: CPT | Performed by: INTERNAL MEDICINE

## 2020-02-28 PROCEDURE — 88305 TISSUE EXAM BY PATHOLOGIST: CPT

## 2020-02-28 PROCEDURE — 87205 SMEAR GRAM STAIN: CPT

## 2020-02-28 PROCEDURE — C1894 INTRO/SHEATH, NON-LASER: HCPCS

## 2020-02-28 PROCEDURE — 80048 BASIC METABOLIC PNL TOTAL CA: CPT

## 2020-02-28 PROCEDURE — 83735 ASSAY OF MAGNESIUM: CPT

## 2020-02-28 PROCEDURE — C1729 CATH, DRAINAGE: HCPCS

## 2020-02-28 PROCEDURE — 6360000002 HC RX W HCPCS

## 2020-02-28 PROCEDURE — 83986 ASSAY PH BODY FLUID NOS: CPT

## 2020-02-28 PROCEDURE — 88112 CYTOPATH CELL ENHANCE TECH: CPT

## 2020-02-28 PROCEDURE — 2500000003 HC RX 250 WO HCPCS: Performed by: PHYSICIAN ASSISTANT

## 2020-02-28 PROCEDURE — 4A023N7 MEASUREMENT OF CARDIAC SAMPLING AND PRESSURE, LEFT HEART, PERCUTANEOUS APPROACH: ICD-10-PCS | Performed by: INTERNAL MEDICINE

## 2020-02-28 PROCEDURE — C1769 GUIDE WIRE: HCPCS

## 2020-02-28 PROCEDURE — 87070 CULTURE OTHR SPECIMN AEROBIC: CPT

## 2020-02-28 PROCEDURE — 0W9B3ZZ DRAINAGE OF LEFT PLEURAL CAVITY, PERCUTANEOUS APPROACH: ICD-10-PCS | Performed by: RADIOLOGY

## 2020-02-28 RX ORDER — FUROSEMIDE 40 MG/1
20 TABLET ORAL DAILY
Qty: 30 TABLET | Refills: 1 | Status: SHIPPED | OUTPATIENT
Start: 2020-03-02 | End: 2020-06-02 | Stop reason: ALTCHOICE

## 2020-02-28 RX ORDER — POTASSIUM CHLORIDE 20 MEQ/1
20 TABLET, EXTENDED RELEASE ORAL DAILY
Qty: 30 TABLET | Refills: 3 | Status: SHIPPED | OUTPATIENT
Start: 2020-02-28 | End: 2020-06-02 | Stop reason: ALTCHOICE

## 2020-02-28 RX ORDER — LIDOCAINE HYDROCHLORIDE 20 MG/ML
INJECTION, SOLUTION INFILTRATION; PERINEURAL
Status: COMPLETED | OUTPATIENT
Start: 2020-02-28 | End: 2020-02-28

## 2020-02-28 RX ORDER — METOPROLOL SUCCINATE 25 MG/1
25 TABLET, EXTENDED RELEASE ORAL DAILY
Qty: 30 TABLET | Refills: 3 | Status: SHIPPED | OUTPATIENT
Start: 2020-02-29 | End: 2020-04-30 | Stop reason: SDUPTHER

## 2020-02-28 RX ADMIN — LIDOCAINE HYDROCHLORIDE 5 ML: 20 INJECTION, SOLUTION INFILTRATION; PERINEURAL at 15:45

## 2020-02-28 ASSESSMENT — PAIN - FUNCTIONAL ASSESSMENT: PAIN_FUNCTIONAL_ASSESSMENT: 0-10

## 2020-02-28 NOTE — PROGRESS NOTES
Met with pt and . Gave them educational material on Aortic Stenosis & TAVR procedure along with Hanh Escobar (TAVR Coordinator) contact information. I informed them that Kari De Los Santos will be calling her when she schedules the procedure. All questions answered.

## 2020-02-28 NOTE — PROGRESS NOTES
ALKPHOS in the last 72 hours. No results for input(s): INR in the last 72 hours. No results for input(s): Karri Relic in the last 72 hours. Radiology:  IR GUIDED THORACENTESIS PLEURAL   Final Result   Right-sided thoracentesis. See the body of the report for details. This examination was performed and dictated by Kristian Farah PA-C   with indirect supervision by Smiley Hinojosa MD, who has reviewed the   provided imaging and has revised the report as necessary. XR CHEST PORTABLE   Final Result   Essentially resolved right pleural effusion status post thoracentesis. No pneumothorax. Hyperexpanded right lung. Persistent moderate left pleural effusion and associated atelectasis. CTA TRANSCATHETER AORTIC VALVE REPLACEMENT   Final Result      1. CT performed for preoperative TAVR planning. 2. Aortic valve calcifications. 3. Moderate to large bilateral pleural effusions with atelectasis   and/or pneumonia in the lung bases including right middle lobe and   lingula. 4. Mild tortuosity involving right common iliac artery. 5. Moderate tortuosity involving distal left subclavian artery and   left axillary artery. 6. Ascending thoracic aortic aneurysm measures up to 4 cm. CTA ABDOMEN PELVIS W CONTRAST   Final Result      1. CT performed for preoperative TAVR planning. 2. Aortic valve calcifications. 3. Moderate to large bilateral pleural effusions with atelectasis   and/or pneumonia in the lung bases including right middle lobe and   lingula. 4. Mild tortuosity involving right common iliac artery. 5. Moderate tortuosity involving distal left subclavian artery and   left axillary artery. 6. Ascending thoracic aortic aneurysm measures up to 4 cm. CTA CHEST W CONTRAST   Final Result      1. CT performed for preoperative TAVR planning. 2. Aortic valve calcifications.       3. Moderate to large bilateral pleural effusions with atelectasis   and/or pneumonia in the lung bases including right middle lobe and   lingula. 4. Mild tortuosity involving right common iliac artery. 5. Moderate tortuosity involving distal left subclavian artery and   left axillary artery. 6. Ascending thoracic aortic aneurysm measures up to 4 cm. US CAROTID ARTERY BILATERAL   Final Result      No evidence of hemodynamically significant stenosis. VL ALMA DELIA BILATERAL LIMITED 1-2 LEVELS   Final Result      IR GUIDED THORACENTESIS PLEURAL    (Results Pending)             Active Hospital Problems    Diagnosis Date Noted    Aortic stenosis, mild [I35.0] 02/25/2020       Assessment  Critical aortic stenosis  Acute combined systolic and diastolic CHF  Acute hypoxic respiratory failure - due to above, improving  Bilateral pleural effusion - moderate to large effusions on CTA chest  Small pericardial effusion      Plan:  Underwent cardiac catheterization-showed clean coronaries this a.m.   IR thoracentesis left pleural effusion this afternoon  On Toprol-XL by cardiologist  Activity as tolerated  Plan to discharge after thoracentesis  Outpatient follow-up with valve clinic after discharge    DVT Prophylaxis: Lovenox  Diet: Diet NPO, After Midnight Exceptions are: Sips with Meds  Code Status: Full Code    PT/OT Eval Status: N/A    Dispo -TBD    Lizy Aguilar MD 2/28/2020 8:31 AM

## 2020-02-28 NOTE — PROGRESS NOTES
Inpatient Cardiology Progress note     PATIENT IS BEING FOLLOWED FOR: critical AS. CHF    Breanna Valles is a 68 y.o. female known to Dr. Deena Ferrara: Denies SOB. No CP. No dizziness / lightheadedness  OBJECTIVE: No apparent distress. Had R sided thoracocentesis earlier today    ROS:  Consist: Denies fevers, chills or night sweats  Heart: Denies chest pain, palpitations, lightheadedness, dizziness or syncope  Lungs: Denies SOB, cough or wheezing  GI: Denies abdominal pain, vomiting or diarrhea    PHYSICAL EXAM:   BP (!) 109/55   Pulse 85   Temp 97.9 °F (36.6 °C) (Temporal)   Resp 17   Ht 5' 6\" (1.676 m)   Wt 138 lb 8 oz (62.8 kg)   SpO2 94%   BMI 22.35 kg/m²    B/P Range last 24 hours: Systolic (18SHM), DBF:39 , Min:80 , HHH:958    Diastolic (89BPW), SFB:41, Min:48, Max:60    CONST: Well developed, well nourished elderly female who appears of stated age. Awake, alert and cooperative. No apparent distress  HEENT:   Head- Normocephalic, atraumatic   Eyes- Conjunctivae pink, anicteric  Throat- Oral mucosa pink and moist  Neck-  No stridor, trachea midline, no jugular venous distention. No carotid bruit  CHEST: Chest symmetrical and non-tender to palpation. No accessory muscle use or intercostal retractions  RESPIRATORY:  Lung sounds - coarse crackles in the right base  with diminished breath sounds left base with crackles. CARDIOVASCULAR:     Heart Inspection- shows no noted pulsations  Heart Palpation- no heaves or thrills; PMI is non-displaced   Heart Ausculation- Regular rate and rhythm. III/VI SM all over the precordium. No s3, s4 or rub   PV: No significant lower extremity edema. No varicosities. Pedal pulses palpable, no clubbing or cyanosis   ABDOMEN: Soft, non-tender to light palpation. Bowel sounds present. No palpable masses no organomegaly; no abdominal bruit  MS: Good muscle strength and tone. No atrophy or abnormal movements.    : Deferred  SKIN: Warm and dry no statis dermatitis or

## 2020-02-28 NOTE — OP NOTE
Indication:  1. Critical AS  2. AUC score: 7  3. AUC indication: 70    Procedure: Coronary angiography    Anesthesia: Versed, Fentanyl  Time sedation was administered: 11:01. I was present in the room when sedation was administered. Procedure end time: 11:20  Time spent with face to face monitoring of moderate sedation: 24 min    LHC performed via right radial approach using a 6 F sheath. 2.5mg of diluted Verapamil and 200mcg of nitroglycerine administered through the sheath. 5000 U heparin administered IV. Findings:  Left main: no significant disease  LAD: no significant disease  Circumflex: no significant disease  RCA: Dominant. No significant disease  LV angio: not performed    Hemodynamics: Ao: 95/54 ( 70 ). Sheath removed and TR band applied. There was good hemostasis achieved and the distal pulses were intact.      Complication: None   Estimated blood loss: minimal  Contrast use: 50 cc    Post op diagnosis:  Patent coronary arteries    PLAN:  CT surgery ( following patient ) notified    Electronically signed by Leonor Crawford MD on 2/28/2020 at 11:42 AM

## 2020-02-28 NOTE — INTERVAL H&P NOTE
H&P Update    Patient's History and Physical  was reviewed. The patient appears likely to able to tolerate the procedure, left thoracentesis. Risk and benefits discussed including ultimate complications, possibly death and consent obtained.     Memo Juarez PA-C

## 2020-02-28 NOTE — PLAN OF CARE
Problem: Falls - Risk of:  Goal: Will remain free from falls  Description  Will remain free from falls  Outcome: Met This Shift  Goal: Absence of physical injury  Description  Absence of physical injury  Outcome: Met This Shift     Problem: OXYGENATION/RESPIRATORY FUNCTION  Goal: Patient will maintain patent airway  Outcome: Met This Shift  Goal: Patient will achieve/maintain normal respiratory rate/effort  Description  Respiratory rate and effort will be within normal limits for the patient  Outcome: Met This Shift     Problem: HEMODYNAMIC STATUS  Goal: Patient has stable vital signs and fluid balance  Outcome: Met This Shift     Problem: FLUID AND ELECTROLYTE IMBALANCE  Goal: Fluid and electrolyte balance are achieved/maintained  Outcome: Met This Shift     Problem: ACTIVITY INTOLERANCE/IMPAIRED MOBILITY  Goal: Mobility/activity is maintained at optimum level for patient  Outcome: Met This Shift
factors she has. We discussed self-managed care which includes the following:  to take her medications as prescribed, if she experiences any intolerable side effects to any medications to please call her cardiologist / doctor, do not just stop taking the medication; follow a cardiac heart healthy / low sodium diet; weigh herself daily, and exercise regularly- per doctor recommendation. We discussed calling her cardiologist / doctor with a weight gain of 3# in one day or a total of 5# or more in one week. I stressed the importance of informing her cardiologist the first day of onset of any of the signs and symptoms in the \"Yellow Zone\" of the HF Zones. She verbalizes understanding. Echocardiogram / EF: 35-40%    BNP:   Lab Results   Component Value Date    PROBNP 15,580 (H) 2020       History of:    has a past medical history of Aortic stenosis, severe, Arthritis, Heart murmur, Heart murmur, HFrEF (heart failure with reduced ejection fraction) ([address] History of blood transfusion, Hypertension, and Varicosities of leg.   has a past surgical history that includes  section and Hip fracture surgery (Right, In her 46s. after a fall. ).     Medications:    metoprolol succinate  25 mg Oral Daily    sodium chloride flush  10 mL Intravenous 2 times per day    enoxaparin  40 mg Subcutaneous Daily         Code Status:Full Code    The patient is ordered:  Diet: DIET CARDIAC;   Sodium/fluid restriction daily ordered (fluid restriction recommended if patient is hyponatremic and/or diuretic is initiated or increased):  [] Yes     [] No  FR:   Daily Weights:   Patient Vitals for the past 96 hrs (Last 3 readings):   Weight   20 0545 138 lb 8 oz (62.8 kg)   20 1317 144 lb (65.3 kg)   20 0515 143 lb 9 oz (65.1 kg)     I/O:     Intake/Output Summary (Last 24 hours) at 2020 1211  Last data filed at 2020 1052  Gross per 24 hour   Intake 240 ml   Output 3550 ml   Net -3310 ml

## 2020-02-28 NOTE — DISCHARGE SUMMARY
Hospital Medicine Discharge Summary    Patient ID: Caffie Pu      Patient's PCP: Deanne Arango MD    Admit Date: 2/25/2020     Discharge Date:   2/28/2020    Admitting Physician: uLca Zepeda MD     Discharge Physician: Luca Zepeda MD     Discharge Diagnoses:  Critical aortic stenosis  Acute combined systolic and diastolic CHF  Bilateral pleural effusion  Small pericardial effusion     Active Hospital Problems    Diagnosis Date Noted    Critical aortic valve stenosis [I35.0] 02/25/2020       The patient was seen and examined on day of discharge and this discharge summary is in conjunction with any daily progress note from day of discharge. Hospital Course:   Pt presented with progressive dyspnea on exertion, decreased exercise tolerance, orthopnea and PND for about 2 weeks. She was found to have acute CHF; work up included echocardiogram which revealed critical aortic stenosis. Patient was evaluated by cardiologist and cardiothoracic surgeon who recommended TAVR; initial work-up for TAVR included left heart catheterization which revealed coronary arteries; CTA chest revealed large bilateral pleural effusion so patient underwent bilateral thoracentesis which improved patient's dyspnea and hypoxia is significantly, she was then successfully weaned off supplemental oxygen. Patient was discharged home in good condition to follow-up with cardiothoracic surgeon who will schedule TAVR electively. Exam:     BP (!) 96/53   Pulse 81   Temp 97.4 °F (36.3 °C) (Tympanic)   Resp 16   Ht 5' 6\" (1.676 m)   Wt 138 lb (62.6 kg)   SpO2 96%   BMI 22.27 kg/m²     General appearance: Sitting comfortably in bed. No apparent distress. Respiratory: Clear to auscultation bilaterally. Decreased basilar breath sounds  Cardiovascular: Normal S1/S2. Regular rhythm and rate. Abdomen: Soft, non-tender, non-distended with normal bowel sounds.   Musculoskeletal: No clubbing, cyanosis or edema

## 2020-02-29 LAB
BODY FLUID CULTURE, STERILE: NORMAL
GRAM STAIN ORDERABLE: NORMAL
GRAM STAIN RESULT: NORMAL

## 2020-03-01 LAB
BODY FLUID CULTURE, STERILE: NORMAL
GRAM STAIN RESULT: NORMAL

## 2020-03-02 ENCOUNTER — OFFICE VISIT (OUTPATIENT)
Dept: FAMILY MEDICINE CLINIC | Age: 70
End: 2020-03-02

## 2020-03-02 ENCOUNTER — TELEPHONE (OUTPATIENT)
Dept: FAMILY MEDICINE CLINIC | Age: 70
End: 2020-03-02

## 2020-03-02 VITALS
HEIGHT: 66 IN | HEART RATE: 92 BPM | BODY MASS INDEX: 22.18 KG/M2 | TEMPERATURE: 97.3 F | SYSTOLIC BLOOD PRESSURE: 80 MMHG | WEIGHT: 138 LBS | OXYGEN SATURATION: 98 % | DIASTOLIC BLOOD PRESSURE: 60 MMHG

## 2020-03-02 PROBLEM — R63.4 WEIGHT LOSS: Status: RESOLVED | Noted: 2020-02-25 | Resolved: 2020-03-02

## 2020-03-02 PROBLEM — J96.01 ACUTE RESPIRATORY FAILURE WITH HYPOXIA (HCC): Status: RESOLVED | Noted: 2020-02-25 | Resolved: 2020-03-02

## 2020-03-02 PROCEDURE — 99495 TRANSJ CARE MGMT MOD F2F 14D: CPT | Performed by: FAMILY MEDICINE

## 2020-03-02 PROCEDURE — 1111F DSCHRG MED/CURRENT MED MERGE: CPT | Performed by: FAMILY MEDICINE

## 2020-03-02 NOTE — PROGRESS NOTES
Post-Discharge Transitional Care Management Services or Hospital Follow Up      Jamalnubiaabdulaziz Saleh   YOB: 1950    Date of Office Visit:  3/2/2020  Date of Hospital Admission: 2/25/20  Date of Hospital Discharge: 2/28/20  Risk of hospital readmission (high >=14%.  Medium >=10%) :Readmission Risk Score: 11      Care management risk score Rising risk (score 2-5) and Complex Care (Scores >=6): 1     Non face to face  following discharge, date last encounter closed (first attempt may have been earlier): 3/2/2020  8:32 AM    Call initiated 2 business days of discharge: Yes    Patient Active Problem List   Diagnosis    Heart murmur    Acute congestive heart failure (HCC)    Aortic valve stenosis    Dyspnea    Atrial flutter ([address]    Critical aortic valve stenosis       No Known Allergies    Medications listed as ordered at the time of discharge from hospital   [facility] Medication Instructions ANNIE:    Printed on:03/02/20 2968   Medication Information                      b complex vitamins capsule  Take 1 capsule by mouth daily             calcium carbonate (OSCAL) 500 MG TABS tablet  Take 500 mg by mouth daily             Cod Liver Oil 1000 MG CAPS  Take by mouth             Coenzyme Q10 (CO Q-10) 100 MG CAPS  Take by mouth daily             furosemide (LASIX) 40 MG tablet  Take 0.5 tablets by mouth daily             magnesium oxide (MAG-OX) 400 MG tablet  Take 400 mg by mouth daily             metoprolol succinate (TOPROL XL) 25 MG extended release tablet  Take 1 tablet by mouth daily             NONFORMULARY  NONFORMULARY NONFORMULARY Indications: hawthorn berry, soy lecithin, kelp white oak bark, lira seal root Indications: hawthorn berry, soy lecithin, kelp white oak bark, lira seal root 0 Active  Historical Provider Historical Provider Raghav 28 ([id number])             potassium chloride (KLOR-CON M) 20 MEQ extended release tablet  Take 1 tablet by mouth daily vitamin C (ASCORBIC ACID) 500 MG tablet  Take 500 mg by mouth daily             vitamin E 1000 units capsule  Take 1,000 Units by mouth daily                   Medications marked \"taking\" at this time  Outpatient Medications Marked as Taking for the 3/2/20 encounter (Office Visit) with Dain Mixon MD   Medication Sig Dispense Refill    NONFORMULARY NONFORMULARY NONFORMULARY Indications: hawthorn berry, soy lecithin, kelp white oak bark, lira seal root Indications: hawthorn berry, soy lecithin, kelp white oak bark, lira seal root 0 Active  Historical Provider Historical Provider Raghav 28 ([id number])      metoprolol succinate (TOPROL XL) 25 MG extended release tablet Take 1 tablet by mouth daily 30 tablet 3    furosemide (LASIX) 40 MG tablet Take 0.5 tablets by mouth daily 30 tablet 1    potassium chloride (KLOR-CON M) 20 MEQ extended release tablet Take 1 tablet by mouth daily 30 tablet 3    magnesium oxide (MAG-OX) 400 MG tablet Take 400 mg by mouth daily      calcium carbonate (OSCAL) 500 MG TABS tablet Take 500 mg by mouth daily      vitamin E 1000 units capsule Take 1,000 Units by mouth daily      Coenzyme Q10 (CO Q-10) 100 MG CAPS Take by mouth daily      vitamin C (ASCORBIC ACID) 500 MG tablet Take 500 mg by mouth daily      b complex vitamins capsule Take 1 capsule by mouth daily      Cod Liver Oil 1000 MG CAPS Take by mouth          Medications patient taking as of now reconciled against medications ordered at time of hospital discharge: Yes    Chief Complaint   Patient presents with   [address] [place] [address] discharged 2/28       History of Present illness - Follow up of Hospital diagnosis(es): Critical AO stenosis      Inpatient course: Discharge summary reviewed- see chart. It is not yet complete. Interval history/Current status: she is having improved SOB. She is doing ok with current medications. Metoprolol, lasix, and potassium.    She had transient A fib or flutter noted during admission. She was not discharged on ASA or other thinners. She is taking it easy. Waiting to hear from CTS regarding AO valve replacement. A comprehensive review of systems was negative except for what was noted in the HPI. Vitals:    03/02/20 1130   BP: 80/60   Site: Left Upper Arm   Position: Sitting   Cuff Size: Medium Adult   Pulse: 92   Temp: 97.3 °F (36.3 °C)   TempSrc: Temporal   SpO2: 98%   Weight: 138 lb (62.6 kg)   Height: 5' 6\" (1.676 m)     Body mass index is 22.27 kg/m². Wt Readings from Last 3 Encounters:   03/02/20 138 lb (62.6 kg)   02/28/20 138 lb (62.6 kg)   02/25/20 152 lb (68.9 kg)     BP Readings from Last 3 Encounters:   03/02/20 80/60   02/28/20 (!) 96/54   02/25/20 127/75        Physical Exam:  General Appearance: alert and oriented to person, place and time, well developed and well- nourished, in no acute distress  Skin: warm and dry, no rash or erythema  Head: normocephalic and atraumatic  Eyes: pupils equal, round, and reactive to light, extraocular eye movements intact, conjunctivae normal  Neck: supple and non-tender without mass, no thyromegaly or thyroid nodules, no cervical lymphadenopathy  Pulmonary/Chest: clear to auscultation bilaterally- no wheezes, + rales minimal in BL bases. NO rhonchi, normal air movement, no respiratory distress  Cardiovascular: normal rate, irregularly irregular rhythm, normal S1 and S2, no murmurs, rubs, clicks, or gallops, distal pulses intact, no carotid bruits  Abdomen: soft, non-tender, non-distended, normal bowel sounds, no masses or organomegaly  Extremities: no cyanosis, clubbing or edema  Musculoskeletal: normal range of motion, no joint swelling, deformity or tenderness    Assessment/Plan:  1.  Critical aortic valve stenosis  VALVE REPLACEMENT PENDING, improved, stable  - NC DISCHARGE MEDS RECONCILED W/ CURRENT OUTPATIENT MED LIST  I made a call to specialist office to improve communication - neither patient nor I

## 2020-03-02 NOTE — PROGRESS NOTES
[facility] 34 PRE-ADMISSION TESTING GENERAL INSTRUCTIONS- [facility]-phone number:[phone]    GENERAL INSTRUCTIONS  [x] Antibacterial Soap shower Night before of Surgery  [] Tj wipe instruction sheet and wipes given. [x] Nothing by mouth after midnight, including gum, candy, mints, or water.  [] You may brush your teeth, gargle, but do NOT swallow water. []Hibiclens shower  the night before and the morning of surgery. Do not use             Hibiclens on your face or head. []No smoking, chewing tobacco, illegal drugs, or alcohol within 24 hours of your surgery. [x] Jewelry, valuables or body piercing's should not be brought to the hospital. All body and/or tongue piercing's must be removed prior to arriving to hospital.  ALL hair pins must be removed. [x] Do not wear makeup, lotions, powders, deodorant. Nail polish as directed by the nurse. [x] Arrange transportation with a responsible adult  to and from the hospital. If you do not have a responsible adult  to transport you, you will need to make arrangements with a medical transportation company (i.e. [employer]. A Uber/taxi/bus is not appropriate unless you are accompanied by a responsible adult ). Arrange for someone to be with you for the remainder of the day and for 24 hours after your procedure due to having had anesthesia. Who will be your  for transportation?______husband____________   Who will be staying with you for 24 hrs after your procedure?__husband________________  [x] Bring insurance card and photo ID.  [] Transfusion Bracelet: Please bring with you to hospital, day of surgery  [] Bring urine specimen day of surgery. Any small container is acceptable. [] Use inhalers the morning of surgery and bring with you to hospital.  [] Bring copy of living will or healthcare power of  papers to be placed in your electronic record.   [] CPAP/BI-PAP: Please bring your machine if you are to spend the night in the hospital.     PARKING INSTRUCTIONS:   [x] Arrival Time:_____0830________  · [x] Parking lot '\"I\"  is located on [facility] (the corner of [facility] and [facility]). To enter, press the button and the gate will lift. A free token will be provided to exit the lot. One car per patient is allowed to park in this lot. All other cars are to park on [address] [place] [address] either in the parking garage or the handicap lot. [] To reach the [facility] lobby from [address] [place] [address], upon entering the hospital, take elevator B to the 3rd floor. EDUCATION INSTRUCTIONS:      [] Knee or hip replacement booklet & exercise pamphlets given. [] Anival 77 placed in chart. [] Pre-admission Testing educational folder given  [] Incentive Spirometry,coughing & deep breathing exercises reviewed. []Medication information sheet(s)   [x]Fluoroscopy-Xray used in surgery reviewed with patient. Educational pamphlet placed in chart. [x]Pain: Post-op pain is normal and to be expected. You will be asked to rate your pain from 0-10(a zero is not acceptable-education is needed). Your post-op pain goal is:4  [] Ask your nurse for your pain medication. [] Joint camp offered. [] Joint replacement booklets given. [] Other:___________________________    MEDICATION INSTRUCTIONS:   [x]Bring a complete list of your medications, please write the last time you took the medicine, give this list to the nurse. [x] Take the following medications the morning of surgery with 1-2 ounces of water:   [x] Stop herbal supplements and vitamins 5 days before your surgery. 3/2/20  [] DO NOT take any diabetic medicine the morning of surgery. Follow instructions for insulin the day before surgery. [] If you are diabetic and your blood sugar is low or you feel symptomatic, you may drink 1-2 ounces of apple juice or take a glucose tablet.   The morning of your procedure, you may call the pre-op area if you have concerns about your blood sugar [phone]. [] Use your inhalers the morning of surgery. Bring your emergency inhaler with you day of surgery. [] Follow physician instructions regarding any blood thinners you may be taking. WHAT TO EXPECT:  [x] The day of surgery you will be greeted and checked in by the [employer].  In addition, you will be registered in the [facility] by a Patient Access Representative. Please bring your photo ID and insurance card. A nurse will greet you in accordance to the time you are needed in the pre-op area to prepare you for surgery. Please do not be discouraged if you are not greeted in the order you arrive as there are many variables that are involved in patient preparation. Your patience is greatly appreciated as you wait for your nurse. Please bring in items such as: books, magazines, newspapers, electronics, or any other items  to occupy your time in the waiting area. [x]  Delays may occur with surgery and staff will make a sincere effort to keep you informed of delays. If any delays occur with your procedure, we apologize ahead of time for your inconvenience as we recognize the value of your time.

## 2020-03-03 ENCOUNTER — PREP FOR PROCEDURE (OUTPATIENT)
Dept: CARDIOLOGY | Age: 70
End: 2020-03-03

## 2020-03-03 ENCOUNTER — ANESTHESIA EVENT (OUTPATIENT)
Dept: OPERATING ROOM | Age: 70
DRG: 267 | End: 2020-03-03

## 2020-03-03 RX ORDER — SODIUM CHLORIDE 0.9 % (FLUSH) 0.9 %
10 SYRINGE (ML) INJECTION PRN
Status: CANCELLED | OUTPATIENT
Start: 2020-03-03

## 2020-03-03 RX ORDER — SODIUM CHLORIDE 9 MG/ML
INJECTION, SOLUTION INTRAVENOUS CONTINUOUS
Status: CANCELLED | OUTPATIENT
Start: 2020-03-03

## 2020-03-03 RX ORDER — CHLORHEXIDINE GLUCONATE 4 G/100ML
SOLUTION TOPICAL ONCE
Status: CANCELLED | OUTPATIENT
Start: 2020-03-03 | End: 2020-03-03

## 2020-03-03 RX ORDER — SODIUM CHLORIDE 0.9 % (FLUSH) 0.9 %
10 SYRINGE (ML) INJECTION EVERY 12 HOURS SCHEDULED
Status: CANCELLED | OUTPATIENT
Start: 2020-03-03

## 2020-03-03 RX ORDER — CHLORHEXIDINE GLUCONATE 0.12 MG/ML
15 RINSE ORAL ONCE
Status: CANCELLED | OUTPATIENT
Start: 2020-03-03 | End: 2020-03-03

## 2020-03-04 ENCOUNTER — ANESTHESIA (OUTPATIENT)
Dept: OPERATING ROOM | Age: 70
DRG: 267 | End: 2020-03-04

## 2020-03-04 ENCOUNTER — HOSPITAL ENCOUNTER (INPATIENT)
Age: 70
LOS: 2 days | Discharge: HOME | DRG: 267 | End: 2020-03-06
Attending: INTERNAL MEDICINE | Admitting: INTERNAL MEDICINE

## 2020-03-04 VITALS — OXYGEN SATURATION: 98 % | SYSTOLIC BLOOD PRESSURE: 107 MMHG | DIASTOLIC BLOOD PRESSURE: 66 MMHG

## 2020-03-04 PROBLEM — I35.0 SEVERE AORTIC STENOSIS: Status: ACTIVE | Noted: 2020-03-04

## 2020-03-04 LAB
ABO/RH: NORMAL
ACTIVATED CLOTTING TIME: 108 SECONDS (ref 99–130)
ANION GAP SERPL CALCULATED.3IONS-SCNC: 13 MMOL/L (ref 7–16)
ANTIBODY IDENTIFICATION: NORMAL
ANTIBODY SCREEN: NORMAL
BACTERIA: ABNORMAL /HPF
BILIRUBIN URINE: NEGATIVE
BLOOD, URINE: ABNORMAL
BUN BLDV-MCNC: 16 MG/DL (ref 8–23)
CALCIUM SERPL-MCNC: 8.4 MG/DL (ref 8.6–10.2)
CHLORIDE BLD-SCNC: 104 MMOL/L (ref 98–107)
CLARITY: CLEAR
CO2: 23 MMOL/L (ref 22–29)
COLOR: YELLOW
CREAT SERPL-MCNC: 0.6 MG/DL (ref 0.5–1)
DAT POLYSPECIFIC: NORMAL
DR. NOTIFY: NORMAL
GFR AFRICAN AMERICAN: >60
GFR NON-AFRICAN AMERICAN: >60 ML/MIN/1.73
GLUCOSE BLD-MCNC: 166 MG/DL (ref 74–99)
GLUCOSE URINE: NEGATIVE MG/DL
HCT VFR BLD CALC: 35.2 % (ref 34–48)
HEMOGLOBIN: 10.6 G/DL (ref 11.5–15.5)
KETONES, URINE: NEGATIVE MG/DL
LEUKOCYTE ESTERASE, URINE: ABNORMAL
MCH RBC QN AUTO: 25.7 PG (ref 26–35)
MCHC RBC AUTO-ENTMCNC: 30.1 % (ref 32–34.5)
MCV RBC AUTO: 85.2 FL (ref 80–99.9)
METER GLUCOSE: 130 MG/DL (ref 74–99)
METER GLUCOSE: 155 MG/DL (ref 74–99)
METER GLUCOSE: 237 MG/DL (ref 74–99)
NITRITE, URINE: NEGATIVE
PDW BLD-RTO: 16 FL (ref 11.5–15)
PH UA: 6 (ref 5–9)
PLATELET # BLD: 236 E9/L (ref 130–450)
PMV BLD AUTO: 10.5 FL (ref 7–12)
POC ACT LR: 145 SECONDS
POC ACT LR: 236 SECONDS
POC ACT LR: 274 SECONDS
POTASSIUM SERPL-SCNC: 3.6 MMOL/L (ref 3.5–5)
POTASSIUM SERPL-SCNC: 4.2 MMOL/L (ref 3.5–5)
PRO-BNP: ABNORMAL PG/ML (ref 0–125)
PROTEIN UA: NEGATIVE MG/DL
RBC # BLD: 4.13 E12/L (ref 3.5–5.5)
RBC UA: ABNORMAL /HPF (ref 0–2)
SODIUM BLD-SCNC: 140 MMOL/L (ref 132–146)
SPECIFIC GRAVITY UA: 1.02 (ref 1–1.03)
UROBILINOGEN, URINE: 0.2 E.U./DL
WBC # BLD: 10.2 E9/L (ref 4.5–11.5)
WBC UA: ABNORMAL /HPF (ref 0–5)

## 2020-03-04 PROCEDURE — 81001 URINALYSIS AUTO W/SCOPE: CPT

## 2020-03-04 PROCEDURE — 86850 RBC ANTIBODY SCREEN: CPT

## 2020-03-04 PROCEDURE — 83880 ASSAY OF NATRIURETIC PEPTIDE: CPT

## 2020-03-04 PROCEDURE — 2500000003 HC RX 250 WO HCPCS

## 2020-03-04 PROCEDURE — 36620 INSERTION CATHETER ARTERY: CPT | Performed by: ANESTHESIOLOGY

## 2020-03-04 PROCEDURE — 02RF38Z REPLACEMENT OF AORTIC VALVE WITH ZOOPLASTIC TISSUE, PERCUTANEOUS APPROACH: ICD-10-PCS | Performed by: INTERNAL MEDICINE

## 2020-03-04 PROCEDURE — 3700000001 HC ADD 15 MINUTES (ANESTHESIA): Performed by: INTERNAL MEDICINE

## 2020-03-04 PROCEDURE — 2709999900 HC NON-CHARGEABLE SUPPLY: Performed by: INTERNAL MEDICINE

## 2020-03-04 PROCEDURE — 2780000006 HC MISC HEART VALVE: Performed by: INTERNAL MEDICINE

## 2020-03-04 PROCEDURE — 33362 REPLACE AORTIC VALVE OPEN: CPT | Performed by: THORACIC SURGERY (CARDIOTHORACIC VASCULAR SURGERY)

## 2020-03-04 PROCEDURE — 86922 COMPATIBILITY TEST ANTIGLOB: CPT

## 2020-03-04 PROCEDURE — B41G1ZZ FLUOROSCOPY OF LEFT LOWER EXTREMITY ARTERIES USING LOW OSMOLAR CONTRAST: ICD-10-PCS | Performed by: INTERNAL MEDICINE

## 2020-03-04 PROCEDURE — 2500000003 HC RX 250 WO HCPCS: Performed by: THORACIC SURGERY (CARDIOTHORACIC VASCULAR SURGERY)

## 2020-03-04 PROCEDURE — 93312 ECHO TRANSESOPHAGEAL: CPT

## 2020-03-04 PROCEDURE — 84132 ASSAY OF SERUM POTASSIUM: CPT

## 2020-03-04 PROCEDURE — 36415 COLL VENOUS BLD VENIPUNCTURE: CPT

## 2020-03-04 PROCEDURE — 86870 RBC ANTIBODY IDENTIFICATION: CPT

## 2020-03-04 PROCEDURE — 2140000000 HC CCU INTERMEDIATE R&B

## 2020-03-04 PROCEDURE — 93321 DOPPLER ECHO F-UP/LMTD STD: CPT

## 2020-03-04 PROCEDURE — 33362 REPLACE AORTIC VALVE OPEN: CPT | Performed by: INTERNAL MEDICINE

## 2020-03-04 PROCEDURE — C1760 CLOSURE DEV, VASC: HCPCS | Performed by: INTERNAL MEDICINE

## 2020-03-04 PROCEDURE — 6370000000 HC RX 637 (ALT 250 FOR IP): Performed by: PHYSICIAN ASSISTANT

## 2020-03-04 PROCEDURE — 93325 DOPPLER ECHO COLOR FLOW MAPG: CPT

## 2020-03-04 PROCEDURE — C1894 INTRO/SHEATH, NON-LASER: HCPCS | Performed by: INTERNAL MEDICINE

## 2020-03-04 PROCEDURE — 85027 COMPLETE CBC AUTOMATED: CPT

## 2020-03-04 PROCEDURE — 86880 COOMBS TEST DIRECT: CPT

## 2020-03-04 PROCEDURE — 82962 GLUCOSE BLOOD TEST: CPT

## 2020-03-04 PROCEDURE — 6360000002 HC RX W HCPCS

## 2020-03-04 PROCEDURE — C1769 GUIDE WIRE: HCPCS | Performed by: INTERNAL MEDICINE

## 2020-03-04 PROCEDURE — 3700000000 HC ANESTHESIA ATTENDED CARE: Performed by: INTERNAL MEDICINE

## 2020-03-04 PROCEDURE — 87081 CULTURE SCREEN ONLY: CPT

## 2020-03-04 PROCEDURE — 80048 BASIC METABOLIC PNL TOTAL CA: CPT

## 2020-03-04 PROCEDURE — 2580000003 HC RX 258: Performed by: PHYSICIAN ASSISTANT

## 2020-03-04 PROCEDURE — 86900 BLOOD TYPING SEROLOGIC ABO: CPT

## 2020-03-04 PROCEDURE — 6360000002 HC RX W HCPCS: Performed by: PHYSICIAN ASSISTANT

## 2020-03-04 PROCEDURE — 85347 COAGULATION TIME ACTIVATED: CPT

## 2020-03-04 PROCEDURE — C1725 CATH, TRANSLUMIN NON-LASER: HCPCS | Performed by: INTERNAL MEDICINE

## 2020-03-04 PROCEDURE — 3600000018 HC SURGERY OHS ADDTL 15MIN: Performed by: INTERNAL MEDICINE

## 2020-03-04 PROCEDURE — B3101ZZ FLUOROSCOPY OF THORACIC AORTA USING LOW OSMOLAR CONTRAST: ICD-10-PCS | Performed by: INTERNAL MEDICINE

## 2020-03-04 PROCEDURE — 86901 BLOOD TYPING SEROLOGIC RH(D): CPT

## 2020-03-04 PROCEDURE — 3600000008 HC SURGERY OHS BASE: Performed by: INTERNAL MEDICINE

## 2020-03-04 DEVICE — ANGIO-SEAL VIP VASCULAR CLOSURE DEVICE
Type: IMPLANTABLE DEVICE | Site: GROIN | Status: FUNCTIONAL
Brand: ANGIO-SEAL

## 2020-03-04 DEVICE — SYSTEM CARD 23MM BOV INCLUDE COMMND DEL ESHEATH INTRO SET: Type: IMPLANTABLE DEVICE | Status: FUNCTIONAL

## 2020-03-04 RX ORDER — OXYCODONE HYDROCHLORIDE AND ACETAMINOPHEN 5; 325 MG/1; MG/1
1 TABLET ORAL EVERY 4 HOURS PRN
Status: DISCONTINUED | OUTPATIENT
Start: 2020-03-04 | End: 2020-03-06 | Stop reason: HOSPADM

## 2020-03-04 RX ORDER — CEFAZOLIN SODIUM 2 G/50ML
2 SOLUTION INTRAVENOUS EVERY 8 HOURS
Status: DISPENSED | OUTPATIENT
Start: 2020-03-04 | End: 2020-03-06

## 2020-03-04 RX ORDER — SODIUM CHLORIDE 9 MG/ML
INJECTION, SOLUTION INTRAVENOUS CONTINUOUS
Status: DISCONTINUED | OUTPATIENT
Start: 2020-03-04 | End: 2020-03-05

## 2020-03-04 RX ORDER — CHLORHEXIDINE GLUCONATE 0.12 MG/ML
15 RINSE ORAL ONCE
Status: COMPLETED | OUTPATIENT
Start: 2020-03-04 | End: 2020-03-04

## 2020-03-04 RX ORDER — ASPIRIN 81 MG/1
81 TABLET ORAL DAILY
Status: DISCONTINUED | OUTPATIENT
Start: 2020-03-04 | End: 2020-03-06 | Stop reason: HOSPADM

## 2020-03-04 RX ORDER — PROTAMINE SULFATE 10 MG/ML
50 INJECTION, SOLUTION INTRAVENOUS
Status: DISCONTINUED | OUTPATIENT
Start: 2020-03-04 | End: 2020-03-04

## 2020-03-04 RX ORDER — POTASSIUM CHLORIDE 20 MEQ/1
20 TABLET, EXTENDED RELEASE ORAL DAILY
Status: DISCONTINUED | OUTPATIENT
Start: 2020-03-04 | End: 2020-03-06 | Stop reason: HOSPADM

## 2020-03-04 RX ORDER — BUPIVACAINE HYDROCHLORIDE AND EPINEPHRINE 5; 5 MG/ML; UG/ML
INJECTION, SOLUTION EPIDURAL; INTRACAUDAL; PERINEURAL PRN
Status: DISCONTINUED | OUTPATIENT
Start: 2020-03-04 | End: 2020-03-04 | Stop reason: ALTCHOICE

## 2020-03-04 RX ORDER — ONDANSETRON 2 MG/ML
4 INJECTION INTRAMUSCULAR; INTRAVENOUS EVERY 8 HOURS PRN
Status: DISCONTINUED | OUTPATIENT
Start: 2020-03-04 | End: 2020-03-06 | Stop reason: HOSPADM

## 2020-03-04 RX ORDER — CALCIUM CARBONATE 500(1250)
500 TABLET ORAL DAILY
Status: DISCONTINUED | OUTPATIENT
Start: 2020-03-04 | End: 2020-03-06 | Stop reason: HOSPADM

## 2020-03-04 RX ORDER — PROTAMINE SULFATE 10 MG/ML
INJECTION, SOLUTION INTRAVENOUS PRN
Status: DISCONTINUED | OUTPATIENT
Start: 2020-03-04 | End: 2020-03-04 | Stop reason: SDUPTHER

## 2020-03-04 RX ORDER — DIMENHYDRINATE 50 MG
100 TABLET ORAL DAILY
Status: DISCONTINUED | OUTPATIENT
Start: 2020-03-04 | End: 2020-03-04

## 2020-03-04 RX ORDER — DEXTROSE MONOHYDRATE 50 MG/ML
100 INJECTION, SOLUTION INTRAVENOUS PRN
Status: DISCONTINUED | OUTPATIENT
Start: 2020-03-04 | End: 2020-03-04

## 2020-03-04 RX ORDER — FENTANYL CITRATE 50 UG/ML
INJECTION, SOLUTION INTRAMUSCULAR; INTRAVENOUS PRN
Status: DISCONTINUED | OUTPATIENT
Start: 2020-03-04 | End: 2020-03-04 | Stop reason: SDUPTHER

## 2020-03-04 RX ORDER — CEFAZOLIN SODIUM 2 G/50ML
2 SOLUTION INTRAVENOUS
Status: COMPLETED | OUTPATIENT
Start: 2020-03-04 | End: 2020-03-04

## 2020-03-04 RX ORDER — METOPROLOL SUCCINATE 25 MG/1
25 TABLET, EXTENDED RELEASE ORAL DAILY
Status: DISCONTINUED | OUTPATIENT
Start: 2020-03-05 | End: 2020-03-06 | Stop reason: HOSPADM

## 2020-03-04 RX ORDER — SODIUM CHLORIDE 0.9 % (FLUSH) 0.9 %
10 SYRINGE (ML) INJECTION EVERY 12 HOURS SCHEDULED
Status: DISCONTINUED | OUTPATIENT
Start: 2020-03-04 | End: 2020-03-04 | Stop reason: HOSPADM

## 2020-03-04 RX ORDER — SODIUM CHLORIDE 0.9 % (FLUSH) 0.9 %
10 SYRINGE (ML) INJECTION PRN
Status: DISCONTINUED | OUTPATIENT
Start: 2020-03-04 | End: 2020-03-06 | Stop reason: HOSPADM

## 2020-03-04 RX ORDER — HEPARIN SODIUM 1000 [USP'U]/ML
INJECTION, SOLUTION INTRAVENOUS; SUBCUTANEOUS PRN
Status: DISCONTINUED | OUTPATIENT
Start: 2020-03-04 | End: 2020-03-04 | Stop reason: SDUPTHER

## 2020-03-04 RX ORDER — DEXAMETHASONE SODIUM PHOSPHATE 10 MG/ML
INJECTION INTRAMUSCULAR; INTRAVENOUS PRN
Status: DISCONTINUED | OUTPATIENT
Start: 2020-03-04 | End: 2020-03-04 | Stop reason: SDUPTHER

## 2020-03-04 RX ORDER — GLYCOPYRROLATE 1 MG/5 ML
SYRINGE (ML) INTRAVENOUS PRN
Status: DISCONTINUED | OUTPATIENT
Start: 2020-03-04 | End: 2020-03-04 | Stop reason: SDUPTHER

## 2020-03-04 RX ORDER — ROCURONIUM BROMIDE 10 MG/ML
INJECTION, SOLUTION INTRAVENOUS PRN
Status: DISCONTINUED | OUTPATIENT
Start: 2020-03-04 | End: 2020-03-04 | Stop reason: SDUPTHER

## 2020-03-04 RX ORDER — NEOSTIGMINE METHYLSULFATE 1 MG/ML
INJECTION, SOLUTION INTRAVENOUS PRN
Status: DISCONTINUED | OUTPATIENT
Start: 2020-03-04 | End: 2020-03-04 | Stop reason: SDUPTHER

## 2020-03-04 RX ORDER — MULTIVIT WITH MINERALS/LUTEIN
1000 TABLET ORAL DAILY
Status: DISCONTINUED | OUTPATIENT
Start: 2020-03-04 | End: 2020-03-06 | Stop reason: HOSPADM

## 2020-03-04 RX ORDER — FUROSEMIDE 20 MG/1
20 TABLET ORAL DAILY
Status: DISCONTINUED | OUTPATIENT
Start: 2020-03-04 | End: 2020-03-06 | Stop reason: HOSPADM

## 2020-03-04 RX ORDER — MIDAZOLAM HYDROCHLORIDE 1 MG/ML
INJECTION INTRAMUSCULAR; INTRAVENOUS PRN
Status: DISCONTINUED | OUTPATIENT
Start: 2020-03-04 | End: 2020-03-04 | Stop reason: SDUPTHER

## 2020-03-04 RX ORDER — VITAMIN C
1 TAB ORAL DAILY
Status: DISCONTINUED | OUTPATIENT
Start: 2020-03-04 | End: 2020-03-06 | Stop reason: HOSPADM

## 2020-03-04 RX ORDER — 0.9 % SODIUM CHLORIDE 0.9 %
250 INTRAVENOUS SOLUTION INTRAVENOUS ONCE
Status: DISCONTINUED | OUTPATIENT
Start: 2020-03-04 | End: 2020-03-06 | Stop reason: HOSPADM

## 2020-03-04 RX ORDER — DEXTROSE MONOHYDRATE 25 G/50ML
12.5 INJECTION, SOLUTION INTRAVENOUS PRN
Status: DISCONTINUED | OUTPATIENT
Start: 2020-03-04 | End: 2020-03-04

## 2020-03-04 RX ORDER — NICOTINE POLACRILEX 4 MG
15 LOZENGE BUCCAL PRN
Status: DISCONTINUED | OUTPATIENT
Start: 2020-03-04 | End: 2020-03-04

## 2020-03-04 RX ORDER — SODIUM CHLORIDE 0.9 % (FLUSH) 0.9 %
10 SYRINGE (ML) INJECTION PRN
Status: DISCONTINUED | OUTPATIENT
Start: 2020-03-04 | End: 2020-03-04 | Stop reason: HOSPADM

## 2020-03-04 RX ORDER — ASCORBIC ACID 500 MG
500 TABLET ORAL DAILY
Status: DISCONTINUED | OUTPATIENT
Start: 2020-03-04 | End: 2020-03-06 | Stop reason: HOSPADM

## 2020-03-04 RX ORDER — ATORVASTATIN CALCIUM 10 MG/1
10 TABLET, FILM COATED ORAL NIGHTLY
Status: DISCONTINUED | OUTPATIENT
Start: 2020-03-04 | End: 2020-03-06 | Stop reason: HOSPADM

## 2020-03-04 RX ORDER — ETOMIDATE 2 MG/ML
INJECTION INTRAVENOUS PRN
Status: DISCONTINUED | OUTPATIENT
Start: 2020-03-04 | End: 2020-03-04 | Stop reason: SDUPTHER

## 2020-03-04 RX ORDER — SODIUM CHLORIDE 0.9 % (FLUSH) 0.9 %
10 SYRINGE (ML) INJECTION EVERY 12 HOURS SCHEDULED
Status: DISCONTINUED | OUTPATIENT
Start: 2020-03-04 | End: 2020-03-06 | Stop reason: HOSPADM

## 2020-03-04 RX ORDER — ONDANSETRON 2 MG/ML
INJECTION INTRAMUSCULAR; INTRAVENOUS PRN
Status: DISCONTINUED | OUTPATIENT
Start: 2020-03-04 | End: 2020-03-04 | Stop reason: SDUPTHER

## 2020-03-04 RX ADMIN — Medication 500 MG: at 20:57

## 2020-03-04 RX ADMIN — SODIUM CHLORIDE: 9 INJECTION, SOLUTION INTRAVENOUS at 16:39

## 2020-03-04 RX ADMIN — SUGAMMADEX 125 MG: 100 INJECTION, SOLUTION INTRAVENOUS at 12:21

## 2020-03-04 RX ADMIN — MIDAZOLAM 2 MG: 1 INJECTION INTRAMUSCULAR; INTRAVENOUS at 11:01

## 2020-03-04 RX ADMIN — CEFAZOLIN SODIUM 2 G: 2 SOLUTION INTRAVENOUS at 20:55

## 2020-03-04 RX ADMIN — DEXAMETHASONE SODIUM PHOSPHATE 10 MG: 10 INJECTION INTRAMUSCULAR; INTRAVENOUS at 11:21

## 2020-03-04 RX ADMIN — HEPARIN SODIUM 2000 UNITS: 1000 INJECTION INTRAVENOUS; SUBCUTANEOUS at 11:43

## 2020-03-04 RX ADMIN — Medication 1000 UNITS: at 20:57

## 2020-03-04 RX ADMIN — POTASSIUM CHLORIDE 20 MEQ: 1500 TABLET, EXTENDED RELEASE ORAL at 20:56

## 2020-03-04 RX ADMIN — Medication 3 MG: at 12:14

## 2020-03-04 RX ADMIN — CHLORHEXIDINE GLUCONATE 0.12% ORAL RINSE 15 ML: 1.2 LIQUID ORAL at 09:08

## 2020-03-04 RX ADMIN — INSULIN LISPRO 2 UNITS: 100 INJECTION, SOLUTION INTRAVENOUS; SUBCUTANEOUS at 13:12

## 2020-03-04 RX ADMIN — MUPIROCIN: 20 OINTMENT TOPICAL at 09:08

## 2020-03-04 RX ADMIN — HEPARIN SODIUM 6500 UNITS: 1000 INJECTION INTRAVENOUS; SUBCUTANEOUS at 11:36

## 2020-03-04 RX ADMIN — Medication 0.6 MG: at 12:14

## 2020-03-04 RX ADMIN — VITAMIN C 1 TABLET: TAB at 20:55

## 2020-03-04 RX ADMIN — ROCURONIUM BROMIDE 40 MG: 10 INJECTION, SOLUTION INTRAVENOUS at 11:21

## 2020-03-04 RX ADMIN — PHENYLEPHRINE HYDROCHLORIDE 50 MCG: 10 INJECTION INTRAVENOUS at 11:39

## 2020-03-04 RX ADMIN — FENTANYL CITRATE 50 MCG: 50 INJECTION, SOLUTION INTRAMUSCULAR; INTRAVENOUS at 11:21

## 2020-03-04 RX ADMIN — HYDROCORTISONE SODIUM SUCCINATE 100 MG: 100 INJECTION, POWDER, FOR SOLUTION INTRAMUSCULAR; INTRAVENOUS at 11:24

## 2020-03-04 RX ADMIN — MAGNESIUM GLUCONATE 500 MG ORAL TABLET 400 MG: 500 TABLET ORAL at 20:56

## 2020-03-04 RX ADMIN — ETOMIDATE 12 MG: 2 INJECTION, SOLUTION INTRAVENOUS at 11:21

## 2020-03-04 RX ADMIN — CEFAZOLIN SODIUM 2 G: 2 SOLUTION INTRAVENOUS at 11:01

## 2020-03-04 RX ADMIN — SODIUM CHLORIDE: 9 INJECTION, SOLUTION INTRAVENOUS at 08:33

## 2020-03-04 RX ADMIN — PROTAMINE SULFATE 50 MG: 10 INJECTION, SOLUTION INTRAVENOUS at 12:00

## 2020-03-04 RX ADMIN — INSULIN LISPRO 2 UNITS: 100 INJECTION, SOLUTION INTRAVENOUS; SUBCUTANEOUS at 21:22

## 2020-03-04 RX ADMIN — ASPIRIN 81 MG: 81 TABLET, COATED ORAL at 20:56

## 2020-03-04 RX ADMIN — CALCIUM 500 MG: 500 TABLET ORAL at 20:56

## 2020-03-04 RX ADMIN — ONDANSETRON HYDROCHLORIDE 4 MG: 2 INJECTION, SOLUTION INTRAMUSCULAR; INTRAVENOUS at 11:21

## 2020-03-04 RX ADMIN — Medication 10 ML: at 20:56

## 2020-03-04 RX ADMIN — FENTANYL CITRATE 100 MCG: 50 INJECTION, SOLUTION INTRAMUSCULAR; INTRAVENOUS at 11:01

## 2020-03-04 ASSESSMENT — PULMONARY FUNCTION TESTS
PIF_VALUE: 1
PIF_VALUE: 1
PIF_VALUE: 2
PIF_VALUE: 21
PIF_VALUE: 20
PIF_VALUE: 22
PIF_VALUE: 21
PIF_VALUE: 1
PIF_VALUE: 3
PIF_VALUE: 27
PIF_VALUE: 21
PIF_VALUE: 23
PIF_VALUE: 1
PIF_VALUE: 21
PIF_VALUE: 21
PIF_VALUE: 15
PIF_VALUE: 20
PIF_VALUE: 20
PIF_VALUE: 1
PIF_VALUE: 15
PIF_VALUE: 22
PIF_VALUE: 20
PIF_VALUE: 1
PIF_VALUE: 1
PIF_VALUE: 22
PIF_VALUE: 21
PIF_VALUE: 21
PIF_VALUE: 20
PIF_VALUE: 21
PIF_VALUE: 21
PIF_VALUE: 20
PIF_VALUE: 21
PIF_VALUE: 3
PIF_VALUE: 1
PIF_VALUE: 21
PIF_VALUE: 20
PIF_VALUE: 21
PIF_VALUE: 22
PIF_VALUE: 21
PIF_VALUE: 10
PIF_VALUE: 20
PIF_VALUE: 21
PIF_VALUE: 28
PIF_VALUE: 21
PIF_VALUE: 20
PIF_VALUE: 22
PIF_VALUE: 1
PIF_VALUE: 21
PIF_VALUE: 19
PIF_VALUE: 21
PIF_VALUE: 22
PIF_VALUE: 1
PIF_VALUE: 21
PIF_VALUE: 1
PIF_VALUE: 1
PIF_VALUE: 20
PIF_VALUE: 21
PIF_VALUE: 20
PIF_VALUE: 21
PIF_VALUE: 21
PIF_VALUE: 22
PIF_VALUE: 21
PIF_VALUE: 17
PIF_VALUE: 21
PIF_VALUE: 1
PIF_VALUE: 1
PIF_VALUE: 21
PIF_VALUE: 1
PIF_VALUE: 19
PIF_VALUE: 21

## 2020-03-04 ASSESSMENT — PAIN SCALES - GENERAL
PAINLEVEL_OUTOF10: 0
PAINLEVEL_OUTOF10: 4

## 2020-03-04 ASSESSMENT — ENCOUNTER SYMPTOMS: SHORTNESS OF BREATH: 1

## 2020-03-04 ASSESSMENT — PAIN - FUNCTIONAL ASSESSMENT: PAIN_FUNCTIONAL_ASSESSMENT: 0-10

## 2020-03-04 NOTE — ANESTHESIA PROCEDURE NOTES
Procedure Performed: GERTRUDIS     Start Time:        End Time:      Preanesthesia Checklist:  Patient identified, IV assessed, risks and benefits discussed, monitors and equipment assessed, procedure being performed at surgeon's request and anesthesia consent obtained. General Procedure Information  Diagnostic Indications for Echo:  assessment of surgical repair, hemodynamic monitoring and assessment of valve function  Physician Requesting Echo: Toula Brittle, MD  Location performed:  OR  Intubated  Bite block placed  Heart visualized  Probe Insertion:  Easy  Probe Type:  3D and mulitplane  Modalities:  3D, color flow mapping, pulse wave Doppler and continuous wave Doppler    Echocardiographic and Doppler Measurements    Ventricles    Right Ventricle:  Cavity size normal.  Hypertrophy not present. Thrombus not present. Global function normal.    Left Ventricle:  Cavity size dilated. Hypertrophy not present. Thrombus not present. Global Function severely impaired. Ejection Fraction 20%. Other Ventricular Findings:       Marked global hypokinesis. No discrete wall motion abnormalities    Ventricular Regional Function:  1- Basal Anteroseptal:  hypokinetic  2- Basal Anterior:  hypokinetic  3- Basal Anterolateral:  hypokinetic  4- Basal Inferolateral:  hypokinetic  5- Basal Inferior:  hypokinetic  6- Basal Inferoseptal:  hypokinetic  7- Mid Anteroseptal:  hypokinetic  8- Mid Anterior:  hypokinetic  9- Mid Anterolateral:  hypokinetic  10- Mid Inferolateral:  hypokinetic  11- Mid Inferior:  hypokinetic  12- Mid Inferoseptal:  hypokinetic  13- Apical Anterior:  hypokinetic  14- Apical Lateral:  hypokinetic  15- Apical Inferior:  hypokinetic  16- Apical Septal:  hypokinetic  17- Sayre:  hypokinetic      Valves    Aortic Valve: Annulus calcified. Stenosis severe. Regurgitation mild. Leaflets calcified. Leaflet motions restricted.   Specific leaflet segments with abnormal motions are described in the following comments:       all    Mitral Valve: Annulus calcified. Stenosis not present. Regurgitation mild. Leaflets calcified. Leaflet motions normal.      Tricuspid Valve: Annulus normal.  Stenosis not present. Regurgitation mild. Leaflets normal.  Leaflet motions normal.    Pulmonic Valve: Annulus normal.  Stenosis not present. Regurgitation none. Aorta    Ascending Aorta:  Size dilated. Dissection not present. Aortic Arch:  Size normal.  Dissection not present. Descending Aorta:  Size normal.  Dissection not present. Other Aortic Findings:       Post stenotic dilatation    Atria    Right Atrium:  Size normal.  Spontaneous echo contrast not present. Thrombus not present. Tumor not present. Device not present. Left Atrium:  Size normal.  Spontaneous echo contrast not present. Thrombus not present. Tumor not present. Device not present. Left atrial appendage normal.      Septa    Atrial Septum:  Intra-atrial septal morphology normal.      Ventricular Septum:  Intra-ventricular septum morphology normal.          Other Findings  Pericardium:  pericardial effusion  Pleural Effusion:  none      Anesthesia Information  Performed Personally  Anesthesiologist:  Nery Abad MD      Echocardiogram Comments:       No difficulty with probe insertion or manipulation  Limited exam for TAVR    Marked global hypokinesis, dilated LV. Severe aortic annulus calcification and severe AS. Mild MR, trace TR  Small pericardial effusion pre-existing intervention. No pleural effusion seen    Post TAVR  Well seated AV. Mild perivavular leak. Globally improved LV function 30-35% EF post TAVR. No change in pre-existing pericardial effusion.

## 2020-03-04 NOTE — ANESTHESIA PROCEDURE NOTES
Central Venous Line:    A central venous line was placed using ultrasound guidance and surface landmarks, in the pre-op for the following indication(s): central venous access and CVP monitoring. 3/4/2020 10:38 AM    Sterility preparation included the following: hand hygiene performed prior to procedure, maximum sterile barriers used and sterile technique used to drape from head to toe. The patient was placed in Trendelenburg position. The right internal jugular vein was prepped. The site was prepped with Chloraprep. A 8.5 Fr (size), 10 (length), introducer slick was placed. During the procedure, the following specific steps were taken: target vein identified, needle advanced into vein and blood aspirated and guidewire advanced into vein. Intravenous verification was obtained by ultrasound and venous blood return. Post insertion care included: all ports aspirated, all ports flushed easily, guidewire removed intact, Biopatch applied, line sutured in place and dressing applied. During the procedure the patient experienced: patient tolerated procedure well with no complications.       Anesthesia type: local..No  Staffing  Anesthesiologist: Brendan Casas MD  Performed: Anesthesiologist   Preanesthetic Checklist  Completed: patient identified, site marked, surgical consent, pre-op evaluation, timeout performed, IV checked, risks and benefits discussed, monitors and equipment checked, anesthesia consent given, oxygen available and patient being monitored

## 2020-03-04 NOTE — ANESTHESIA PRE PROCEDURE
chloride infusion   Intravenous Continuous Nikky  100 mL/hr at 20 0031      ceFAZolin (ANCEF) 2 g in dextrose 3 % 50 mL IVPB (duplex)  2 g Intravenous On Call to [address] [place] [address]        sodium chloride flush 0.9 % injection 10 mL  10 mL Intravenous 2 times per day Nikky         sodium chloride flush 0.9 % injection 10 mL  10 mL Intravenous PRN Nikky            Allergies:  No Known Allergies    Problem List:    Patient Active Problem List   Diagnosis Code    Heart murmur R01.1    Acute congestive heart failure (HCC) I50.9    Aortic valve stenosis I35.0    Dyspnea R06.00    Atrial flutter (HCC) I48.92    Critical aortic valve stenosis I35.0    Severe aortic stenosis I35.0       Past Medical History:        Diagnosis Date    Aortic stenosis, severe     Arthritis     Heart murmur     Heart murmur     Since birth    HFrEF (heart failure with reduced ejection fraction) ([facility] [facility] 2020- echo- LVEF 35-40%, stage II DD, moderately enlarged LA, mild MR, mild TR, critical AS, LVDD: 5.0, RVDD: 2.4    History of blood transfusion     Hypertension     Pleural effusion, bilateral 2020    Varicosities of leg        Past Surgical History:        Procedure Laterality Date     SECTION      [facility]IP [facility]RACTURE [facility] Right In [facility] 46s. [facility]    THORACENTESIS Right 2020    1000 cc    THORACENTESIS Left 2020    425 cc       Social History:    Social History     Tobacco Use    Smoking status: Never Smoker    Smokeless tobacco: Never Used   Substance Use Topics    Alcohol use:  No                                Counseling given: Not Answered      Vital Signs (Current):   Vitals:    20 0811 20 0831 20 0950   BP:  116/60    Pulse:  96 80   Resp:  20 20   Temp:  97.1 °F (36.2 °C)    TempSrc:  Temporal    SpO2:  98% 96%   Weight: 138 lb (62.6 kg)     Height: 5' 6\" (1.676 m) BP Readings from Last 3 Encounters:   03/04/20 116/60   03/02/20 80/60   02/28/20 (!) 96/54       NPO Status: Time of last liquid consumption: 2355                        Time of last solid consumption: 2355                        Date of last liquid consumption: 03/03/20                        Date of last solid food consumption: 03/03/20    BMI:   Wt Readings from Last 3 Encounters:   03/04/20 138 lb (62.6 kg)   03/02/20 138 lb (62.6 kg)   02/28/20 138 lb (62.6 kg)     Body mass index is 22.27 kg/m². CBC:   Lab Results   Component Value Date    WBC 8.6 02/26/2020    RBC 4.69 02/26/2020    HGB 12.1 02/26/2020    HCT 39.3 02/26/2020    MCV 83.8 02/26/2020    RDW 15.9 02/26/2020     02/26/2020       CMP:   Lab Results   Component Value Date     02/28/2020    K 3.8 02/28/2020    CL 96 02/28/2020    CO2 28 02/28/2020    BUN 25 02/28/2020    CREATININE 0.7 02/28/2020    GFRAA >60 02/28/2020    LABGLOM >60 02/28/2020    GLUCOSE 103 02/28/2020    PROT 7.5 10/31/2017    CALCIUM 9.0 02/28/2020    BILITOT 0.4 10/31/2017    ALKPHOS 113 10/31/2017    AST 22 10/31/2017    ALT 14 10/31/2017       POC Tests: No results for input(s): POCGLU, POCNA, POCK, POCCL, POCBUN, POCHEMO, POCHCT in the last 72 hours.     Coags:   Lab Results   Component Value Date    PROTIME 12.7 02/25/2020    INR 1.1 02/25/2020    APTT 32.7 02/25/2020       HCG (If Applicable): No results found for: PREGTESTUR, PREGSERUM, HCG, HCGQUANT     ABGs: No results found for: PHART, PO2ART, QGP2CJP, YXO2JMT, BEART, A2NXMARX     Type & Screen (If Applicable):  No results found for: LABABO, [address]    Anesthesia Evaluation  Patient summary reviewed and Nursing notes reviewed no history of anesthetic complications:   Airway: Mallampati: II  TM distance: >3 FB   Neck ROM: full  Mouth opening: > = 3 FB Dental:          Pulmonary: breath sounds clear to auscultation  (+) shortness of breath: chronic and no interval change,

## 2020-03-04 NOTE — OP NOTE
units). A 6F MPA catheter was advanced into the ascending aorta over a J wire. The J wire was then exchanged for a Lunderquist that was advanced to the ascending aorta and the MPA catheter was removed. A 14F Leong delivery system was advanced into the descending aorta. The sheath was sutured in. ACT was measured at 270s and an additional 2000 units of heparin was administered.         Next, an AL1 catheter with a straight tipped J wire was advanced through the large sheath and used to cross the aortic valve. AL1 was then advanced into the left ventricle. AL1 catheter was exchanged for a pigtail catheter over a J wire. The J wire was exchanged for a 0.035 X 260cm Amplatz extra stiff wire that was advanced into the LV cavity. The pigtail catheter was then removed.       A 23mm Leong Chantel valve delivery system was then advanced into the ascending aorta and the valve was then positioned across the stenotic native valve. With rapid pacing at 180 beats per minute, the valve was deployed in the standard technique(17cc in the inflation device). A post-procedure GERTRUDIS initially revealed  perivalvular regurgitation and therefore a second balloon inflation made with 18 cc in device. .  reimaging with GERTRUDIS revealed only trivial PVL  The valve delivery system and the extra stiff Amplatz wire were removed.      The 14F sheath was removed and the arteriotomy repaired. Protamine was administered  Good hemostasis was achieved.      The 6F arterial sheath was then removed from the LFA and a 6F Angioseal device was then deployed using the standard technique to achieve excellent hemostasis.  The venous sheath was then removed and hemostasis was achieved using manual compression.      Complications: None  Blood loss: 25cc  Contrast use: 15cc  Total fluoroscopic time: 6.2min  Specimen: none     Plan:   1. CCU observation overnight for further management per TAVR protocol.

## 2020-03-04 NOTE — H&P
Patient name: Rajesh Hunter    Reason for admission: TAVR    Admitting Physician: Pattie Burden MD    Primary Care Physician: Selin Rivera MD    Date of service: 3/4/2020    Chief Complaint: critical aortic stenosis     HPI: Mrs. Lashawn Potter is a 68year old,  female with history of recent acute, HFrEF associated with bilateral pleural effusions requiring thoracentesis. She presented to the hospital last week with complaints of progressively worsening ALVARENGA, LE edema and orthopnea. She was found to have large bilateral pleural effusions and acute CHF. She has a known history of critical aortic stenosis dating back to an echo in 2017 with current study showing a mean transvalvular gradient still in the critical range at 75 mmHg despite LVEF now reduced to 35%. She underwent IV diuresis and bilateral thoracentesis. It was felt best that she undergo complete TAVR work up while inpatient to expedite having the procedure completed as soon as possible. Cardiac cath showed no significant disease. Heart team review of CT scans shows atherosclerosis of the thoracic aorta with question of healed dissection and PVD in the lower extremities but still adequate for transfemoral approach. STS risk score is calculated at 2.1%.      Allergies: No Known Allergies    Home medications:    Current Facility-Administered Medications   Medication Dose Route Frequency Provider Last Rate Last Dose    0.9 % sodium chloride infusion   Intravenous Continuous Edger Chavez, PA        ceFAZolin (ANCEF) 2 g in dextrose 3 % 50 mL IVPB (duplex)  2 g Intravenous On Call to [address] [place] [address]        chlorhexidine (PERIDEX) 0.12 % solution 15 mL  15 mL Mouth/Throat Once Edger Chavez, [address]        sodium chloride flush 0.9 % injection 10 mL  10 mL Intravenous 2 times per day Edger Chavez, PA        sodium chloride flush 0.9 % injection 10 mL  10 mL Intravenous PRN Edger Chavez, PA        mupirocin (BACTROBAN) 2 % ointment   Topical Once [place], [place]           Past Medical History:  Past Medical History:   Diagnosis Date    Aortic stenosis, severe     Arthritis     Heart murmur     Heart murmur     Since birth    [facility]- LVEF 35-40%, stage II DD, moderately enlarged LA, mild MR, mild TR, critical AS, LVDD: 5.0, RVDD: 2.4    History of blood transfusion     Hypertension     Pleural effusion, bilateral 2020    Varicosities of leg        Past Surgical History:  Past Surgical History:   Procedure Laterality Date     SECTION      HIP FRACTURE SURGERY Right In her 46s. after a fall.     [place] Moses Farah, [place]    THORACENTESIS Right 2020    1000 cc    THORACENTESIS Left 2020    425 cc       Social History:  Social History     Socioeconomic History    Marital status:      Spouse name: Not on file    Number of children: Not on file    Years of education: Not on file    Highest education level: Not on file   Occupational History    Not on file   Social Needs    Financial resource strain: Not on file    Food insecurity:     Worry: Not on file     Inability: Not on file    Transportation needs:     Medical: Not on file     Non-medical: Not on file   Tobacco Use    Smoking status: Never Smoker    Smokeless tobacco: Never Used   Substance and Sexual Activity    Alcohol use: No    Drug use: No    Sexual activity: Not on file   Lifestyle    Physical activity:     Days per week: Not on file     Minutes per session: Not on file    Stress: Not on file   Relationships    Social connections:     Talks on phone: Not on file     Gets together: Not on file     Attends Che[employer] service: Not on file     Active member of club or organization: Not on file     Attends meetings of clubs or organizations: Not on file     Relationship status: Not on file    Intimate partner violence:     Fear of current or ex partner: Not on file     Emotionally abused: Not on file     Physically abused: Not on file     Forced sexual activity: Not on file   Other Topics Concern    Not on file   Social History Narrative    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 Review of Systems:  Constitutional: Denies fevers, chills, or weight loss. HEENT: Denies visual changes or hearing loss. Heart: As per HPI. Lungs: Denies shortness of breath, cough, or wheezing. Gastrointestinal: Denies nausea, vomiting, constipation, or diarrhea. Genitourinary: dysuria or hematuria. Psychiatric: Patient denies anxiety or depression. Neurologic: Patient denies weakness of the extremities, dizziness, or headaches. All other ROS checked and found to be negative. Objective:  Vitals Ht 5' 6\" (1.676 m)   Wt 138 lb (62.6 kg)   BMI 22.27 kg/m²   General Appearance: Pleasant 68y.o. year old female who appears stated age. Communicates well, no acute distress. HEENT: Head is normocephalic, atraumatic. EOMs intact, PERRL. Trachea midline. Lungs: Normal respiratory rate and normal effort. She is not in respiratory distress. Breath sounds clear to auscultation. No wheezes. Heart: Normal rate. Regular rhythm. S1 normal and S2 normal. Positive for murmur. Chest: Symmetric chest wall expansion. Extremities: Normal range of motion. Neurological: Patient is alert and oriented to person, place and time. Patient has normal reflexes. Skin: Warm and dry. Abdomen: Abdomen is soft and non-distended. Bowel sounds are normal. There is no abdominal tenderness tenderness. There is no guarding. There is no mass. Pulses: Distal pulses are intact.   Skin: Warm and dry without lesions. Assessment:   1. Critical AS  2. HFrEF - NYHA class IIId, EF 35%  3. Bilateral pleural effusions s/p bilateral thoracentesis         Plan:   1. TAVR  2.  Continue current meds; will add statin to due PVD         Electronically signed by Garth Acuña PA-C on 3/4/2020 at 8:17 AM

## 2020-03-04 NOTE — OP NOTE
TRANSCUTANEOUS AORTIC VALVE REPLACEMENT      Date of procedure:  3/4/2020    : REZA    Co-/Surgeon: KELSEY      Pre-operative diagnosis: Severe symptomatic aortic stenosis. Post-operative diagnosis: Successful transcutaneous aortic valve replacement using a 23mm Leong Chantel S3 valve. nursing home (Major co morbidities and conditions):       Procedure:   Access of femoral artery bilaterally and femoral vein. Femoral angiography. Temporary transvenous pacer insertion. Aortic root arteriography. Transfemoral transcatheter aortic valve replacment(TAVR) using a 23mm Leong Chantel S3 valve. 6F Angioseal VIP device placement. Anesthesia: General anesthesia. Indication for procedure:   1. Severe symptomatic aortic stenosis. 2. High risk/inoperable surgical candidate. Description of the procedure: Following informed consent, the patient was bought to the cardiac catheterization laboratory and placed under general anesthesia. A transesophageal echocardiographic probe was advanced into the mid esophagus. A right IJ and a radial arterial line was placed by anesthesia. Using US guidance a COOK NEEDLE  was used to access the LFA and the arteriotomy was confirmed using femoral angrography. A 6F sheath was placed in the LFA. Using US guidance, the LFV was accessed and a locking 6F sheath was placed in the femoral vein. A balloon tipped temporary transvenous pacemaker was inserted through the femoral venous sheath and into the RV apex. Pacing threshold were obtained and the pacemaker was placed in standby mode. A 6F marker pigtail catheter was then advanced into the aorta and placed at the level of the aortic cusps. A baseline aortic root angiogram was performed in the optimal views. Using cutdown over the  Right femoral vessels a cook needle was used to access the RFA and. A 6F sheath was placed. Weight based heparin bolus was then administered(6500 units).   A 6F MPA catheter was advanced into the ascending aorta over a J wire. The J wire was then exchanged for a Lunderquist that was advanced to the ascending aorta and the MPA catheter was removed. A 14F Leong delivery system was advanced into the descending aorta. The sheath was sutured in. ACT was measured at 270s and an additional 2000 units of heparin was administered. Next, an AL1 catheter with a straight tipped J wire was advanced through the large sheath and used to cross the aortic valve. AL1 was then advanced into the left ventricle. AL1 catheter was exchanged for a pigtail catheter over a J wire. The J wire was exchanged for a 0.035 X 260cm Amplatz extra stiff wire that was advanced into the LV cavity. The pigtail catheter was then removed. A 23mm Leong Chantel valve delivery system was then advanced into the ascending aorta and the valve was then positioned across the stenotic native valve. With rapid pacing at 180 beats per minute, the valve was deployed in the standard technique(17cc in the inflation device). A post-procedure GERTRUDIS initially revealed  perivalvular regurgitation and therefore a second balloon inflation made with 18 cc in device. .  reimaging with GERTRUDIS revealed only trivial PVL  The valve delivery system and the extra stiff Amplatz wire were removed. The 14F sheath was removed and the arteriotomy repaired. Protamine was administered  Good hemostasis was achieved. The 6F arterial sheath was then removed from the LFA and a 6F Angioseal device was then deployed using the standard technique to achieve excellent hemostasis. The venous sheath was then removed and hemostasis was achieved using manual compression. Complications: None  Blood loss: 25cc  Contrast use: 15cc  Total fluoroscopic time: 6.2min    Plan:   1. CCU observation overnight for further management per TAVR protocol.

## 2020-03-04 NOTE — ANESTHESIA PROCEDURE NOTES
Arterial Line:    An arterial line was placed using surface landmarks, in the procedure area for the following indication(s): continuous blood pressure monitoring and blood sampling needed. A 20 gauge (size), 1 and 3/8 inch (length), Arrow (type) catheter was placed, Seldinger technique not used, into the right radial artery, secured by tape. Anesthesia type: Local  Local infiltration: Injection    Events:  patient tolerated procedure well with no complications.   Anesthesiologist: Myra Pedraza MD  Resident/CRNA: YARITZA Munoz CRNA  Performed: Resident/CRNA   Preanesthetic Checklist  Completed: patient identified, site marked, surgical consent, pre-op evaluation, timeout performed, IV checked, risks and benefits discussed, monitors and equipment checked, anesthesia consent given, oxygen available and patient being monitored

## 2020-03-04 NOTE — PROGRESS NOTES
HEART VALVE TEAM PROGRESS NOTE    Evaluated in ICU POD # 0. No complaints. No pain in groins. No issues since admission to ICU. PE:   /65   Pulse 66   Temp 97.1 °F (36.2 °C) (Temporal)   Resp (!) 6   Ht 5' 6\" (1.676 m)   Wt 138 lb (62.6 kg)   SpO2 100%   BMI 22.27 kg/m²   CARDIOVASCULAR:   Heart Inspection shows no noted pulsations  Heart Palpation: no heaves or thrills, PMI in 5th ISC near left midclavicular line   Heart Ausculation -Normal S1 and S2, RRR, no murmur, s3, s4 or rub noted. PV: no extremity edema. No varicosities. Pedal pulses with +DP/PT signals, no clubbing or cyanosis     Monitor: SR      Lab Review   Postop labs pending     No results for input(s): WBC, HGB, HCT, PLT in the last 72 hours. No results for input(s): NA, K, CL, CO2, PHOS, BUN, CREATININE in the last 72 hours. Invalid input(s): CA    No results for input(s): AST, ALT, ALB, BILIDIR, ALKPHOS in the last 72 hours. No results for input(s): BNP, CKTOTAL, CKMB in the last 72 hours. No results for input(s): BNP, INR in the last 72 hours. Assessment:  1. POD # 0 TAVR with 23mm Leong Chantel S3. Hemodynamically stable. 2. Stable groin sites bilaterally, dressings intact        Plan:  1.  Continue ICU care for 6 hours, if remains stable will transfer to CSU

## 2020-03-05 PROBLEM — R76.8 RED BLOOD CELL ANTIBODY POSITIVE: Chronic | Status: ACTIVE | Noted: 2020-03-05

## 2020-03-05 LAB
ANION GAP SERPL CALCULATED.3IONS-SCNC: 11 MMOL/L (ref 7–16)
ANION GAP SERPL CALCULATED.3IONS-SCNC: 13 MMOL/L (ref 7–16)
BUN BLDV-MCNC: 19 MG/DL (ref 8–23)
BUN BLDV-MCNC: 19 MG/DL (ref 8–23)
CALCIUM SERPL-MCNC: 8.9 MG/DL (ref 8.6–10.2)
CALCIUM SERPL-MCNC: 9.6 MG/DL (ref 8.6–10.2)
CHLORIDE BLD-SCNC: 100 MMOL/L (ref 98–107)
CHLORIDE BLD-SCNC: 102 MMOL/L (ref 98–107)
CO2: 24 MMOL/L (ref 22–29)
CO2: 25 MMOL/L (ref 22–29)
CREAT SERPL-MCNC: 0.7 MG/DL (ref 0.5–1)
CREAT SERPL-MCNC: 0.8 MG/DL (ref 0.5–1)
EKG ATRIAL RATE: 61 BPM
EKG P AXIS: 67 DEGREES
EKG P-R INTERVAL: 156 MS
EKG Q-T INTERVAL: 494 MS
EKG QRS DURATION: 90 MS
EKG QTC CALCULATION (BAZETT): 497 MS
EKG R AXIS: -28 DEGREES
EKG T AXIS: -129 DEGREES
EKG VENTRICULAR RATE: 61 BPM
GFR AFRICAN AMERICAN: >60
GFR AFRICAN AMERICAN: >60
GFR NON-AFRICAN AMERICAN: >60 ML/MIN/1.73
GFR NON-AFRICAN AMERICAN: >60 ML/MIN/1.73
GLUCOSE BLD-MCNC: 107 MG/DL (ref 74–99)
GLUCOSE BLD-MCNC: 115 MG/DL (ref 74–99)
HCT VFR BLD CALC: 32.7 % (ref 34–48)
HCT VFR BLD CALC: 37.1 % (ref 34–48)
HEMOGLOBIN: 11.1 G/DL (ref 11.5–15.5)
HEMOGLOBIN: 9.7 G/DL (ref 11.5–15.5)
MCH RBC QN AUTO: 25.5 PG (ref 26–35)
MCH RBC QN AUTO: 25.6 PG (ref 26–35)
MCHC RBC AUTO-ENTMCNC: 29.7 % (ref 32–34.5)
MCHC RBC AUTO-ENTMCNC: 29.9 % (ref 32–34.5)
MCV RBC AUTO: 85.5 FL (ref 80–99.9)
MCV RBC AUTO: 85.8 FL (ref 80–99.9)
MRSA CULTURE ONLY: NORMAL
PDW BLD-RTO: 16 FL (ref 11.5–15)
PDW BLD-RTO: 16.1 FL (ref 11.5–15)
PLATELET # BLD: 196 E9/L (ref 130–450)
PLATELET # BLD: 224 E9/L (ref 130–450)
PMV BLD AUTO: 10.7 FL (ref 7–12)
PMV BLD AUTO: 10.9 FL (ref 7–12)
POTASSIUM SERPL-SCNC: 5 MMOL/L (ref 3.5–5)
POTASSIUM SERPL-SCNC: 5 MMOL/L (ref 3.5–5)
RBC # BLD: 3.81 E12/L (ref 3.5–5.5)
RBC # BLD: 4.34 E12/L (ref 3.5–5.5)
SODIUM BLD-SCNC: 137 MMOL/L (ref 132–146)
SODIUM BLD-SCNC: 138 MMOL/L (ref 132–146)
WBC # BLD: 10 E9/L (ref 4.5–11.5)
WBC # BLD: 12.3 E9/L (ref 4.5–11.5)

## 2020-03-05 PROCEDURE — 36415 COLL VENOUS BLD VENIPUNCTURE: CPT

## 2020-03-05 PROCEDURE — 6360000002 HC RX W HCPCS: Performed by: PHYSICIAN ASSISTANT

## 2020-03-05 PROCEDURE — 80048 BASIC METABOLIC PNL TOTAL CA: CPT

## 2020-03-05 PROCEDURE — 85027 COMPLETE CBC AUTOMATED: CPT

## 2020-03-05 PROCEDURE — 99233 SBSQ HOSP IP/OBS HIGH 50: CPT | Performed by: INTERNAL MEDICINE

## 2020-03-05 PROCEDURE — 6370000000 HC RX 637 (ALT 250 FOR IP): Performed by: PHYSICIAN ASSISTANT

## 2020-03-05 PROCEDURE — 93010 ELECTROCARDIOGRAM REPORT: CPT | Performed by: INTERNAL MEDICINE

## 2020-03-05 PROCEDURE — 93005 ELECTROCARDIOGRAM TRACING: CPT | Performed by: PHYSICIAN ASSISTANT

## 2020-03-05 PROCEDURE — 2140000000 HC CCU INTERMEDIATE R&B

## 2020-03-05 PROCEDURE — 2580000003 HC RX 258: Performed by: PHYSICIAN ASSISTANT

## 2020-03-05 RX ADMIN — Medication 1000 UNITS: at 08:25

## 2020-03-05 RX ADMIN — Medication 10 ML: at 08:25

## 2020-03-05 RX ADMIN — VITAMIN C 1 TABLET: TAB at 08:25

## 2020-03-05 RX ADMIN — CEFAZOLIN SODIUM 2 G: 2 SOLUTION INTRAVENOUS at 11:55

## 2020-03-05 RX ADMIN — CALCIUM 500 MG: 500 TABLET ORAL at 08:25

## 2020-03-05 RX ADMIN — METOPROLOL SUCCINATE 25 MG: 25 TABLET, EXTENDED RELEASE ORAL at 08:25

## 2020-03-05 RX ADMIN — POTASSIUM CHLORIDE 20 MEQ: 1500 TABLET, EXTENDED RELEASE ORAL at 08:25

## 2020-03-05 RX ADMIN — FUROSEMIDE 20 MG: 20 TABLET ORAL at 08:25

## 2020-03-05 RX ADMIN — CEFAZOLIN SODIUM 2 G: 2 SOLUTION INTRAVENOUS at 03:03

## 2020-03-05 RX ADMIN — Medication 500 MG: at 08:25

## 2020-03-05 RX ADMIN — MAGNESIUM GLUCONATE 500 MG ORAL TABLET 400 MG: 500 TABLET ORAL at 08:25

## 2020-03-05 RX ADMIN — ASPIRIN 81 MG: 81 TABLET, COATED ORAL at 08:25

## 2020-03-05 ASSESSMENT — PAIN SCALES - GENERAL
PAINLEVEL_OUTOF10: 0

## 2020-03-05 NOTE — PROGRESS NOTES
POST TAVR EDUCATION:     Met with patient to discuss the following: TAVR procedure & TAVR DC Instructions:     Reviewed the TAVR procedure  Instructed on Incision Care: For patients that have Dermabond-they are to leave the groin dressing intact for 1 more day and can be removed tomorrow. Do not pick at  the dermabond. Patient may shower starting tomorrow. Patient is to report any redness or drainage from groin incision. SBE prophylaxis reviewed    Follow up appts in Valve Clinic:  3/19/20 at 11:00 am for an incision check  4/9/20 at 2:30 pm for an echo      \"Life After TAVR\" folder & written DC instructions given to pt. Patient verbally comprehended DC instructions as evidencedby \"read back\"  All questions answered        CHF DISCHARGE INSTRUCTIONS:    1) Take your medications correctly every day. Do not skip doses & do not run out of pills. 2) Follow a low sodium diet (2000 mg/day)     3) Daily exercise    4) Check yourself daily      A) How do you feel? Are you short of breath? Do you have to sleep on more pillows to avoid shortness of breath? Are you dizzy or light-headed?         B) Do you have swelling      C) Weigh yourself daily      5) Call your doctor if you have any of the following:      A) You are more short of breath than usual or short of breath at rest      B) You have to sleep upright or in a chair because of your shortness of breath      C) You have more swelling in your legs than usual       D) You have a lot of dizziness or light-headedness that is worse than usual       D) Your weight goes up by 3 pounds in one day or 5 pounds in a week    6) Know your heart failure zones to manage your disease      7) Go to all your scheduled doctor appointments    Time spent with patient: 15 minutes

## 2020-03-05 NOTE — PLAN OF CARE
Problem:  Activity:  Goal: Capacity to carry out activities will improve, regarding daily activities  Description  Capacity to carry out activities will improve, regarding daily activities  Outcome: Met This Shift  Goal: Ability to implement measures to reduce episodes of fatigue will improve  Description  Ability to implement measures to reduce episodes of fatigue will improve  Outcome: Met This Shift     Problem: Cardiac:  Goal: Ability to achieve and maintain adequate cardiopulmonary perfusion will improve  Description  Ability to achieve and maintain adequate cardiopulmonary perfusion will improve  Outcome: Met This Shift  Goal: Ability to maintain an adequate cardiac output will be supported  Description  Ability to maintain an adequate cardiac output will be supported  Outcome: Met This Shift

## 2020-03-05 NOTE — PLAN OF CARE
Problem:  Activity:  Goal: Capacity to carry out activities will improve, regarding daily activities  Description  Capacity to carry out activities will improve, regarding daily activities  Outcome: Ongoing  Goal: Ability to implement measures to reduce episodes of fatigue will improve  Description  Ability to implement measures to reduce episodes of fatigue will improve  Outcome: Ongoing     Problem: Cardiac:  Goal: Ability to achieve and maintain adequate cardiopulmonary perfusion will improve  Description  Ability to achieve and maintain adequate cardiopulmonary perfusion will improve  Outcome: Ongoing  Goal: Ability to maintain an adequate cardiac output will be supported  Description  Ability to maintain an adequate cardiac output will be supported  Outcome: Ongoing  Goal: Will show no evidence of cardiac arrhythmias  Description  Will show no evidence of cardiac arrhythmias  Outcome: Ongoing     Problem: Coping:  Goal: Ability to identify and develop effective coping behavior will improve  Description  Ability to identify and develop effective coping behavior will improve  Outcome: Ongoing  Goal: Level of anxiety will decrease  Description  Level of anxiety will decrease  Outcome: Ongoing     Problem: Fluid Volume:  Goal: Ability to maintain a balanced intake and output will improve  Description  Ability to maintain a balanced intake and output will improve  Outcome: Ongoing     Problem: Nutritional:  Goal: Ability to identify appropriate dietary choices will improve  Description  Ability to identify appropriate dietary choices will improve  Outcome: Ongoing     Problem: Physical Regulation:  Goal: Complications related to the disease process, condition or treatment will be avoided or minimized  Description  Complications related to the disease process, condition or treatment will be avoided or minimized  Outcome: Ongoing     Problem: Respiratory:  Goal: Ability to maintain adequate ventilation will

## 2020-03-05 NOTE — ANESTHESIA POSTPROCEDURE EVALUATION
Department of Anesthesiology  Postprocedure Note    Patient: Ben Thakur  MRN: [id number]  YOB: 1950  Date of evaluation: 3/5/2020  Time:  6:36 AM     Procedure Summary     Date:  03/04/20 Room / Location:  [facility] 03 / [facility]    Anesthesia Start:  1101 Anesthesia Stop:  7847    Procedures:       TRANSCATHETER AORTIC VALVE REPLACEMENT FEMORAL APPROACH (N/A )      GERTRUDIS (N/A ) Diagnosis:  (AORTIC STENOSIS)    Surgeon:  Keena Veras MD Responsible Provider:  Yanet Gomez MD    Anesthesia Type:  general ASA Status:  4          Anesthesia Type: general    Raciel Phase I: Raciel Score: 10    Raciel Phase II:      Last vitals: Reviewed and per EMR flowsheets.        Anesthesia Post Evaluation    Patient location during evaluation: floor  Patient participation: complete - patient participated  Level of consciousness: awake and alert  Airway patency: patent  Nausea & Vomiting: no nausea and no vomiting  Complications: no  Cardiovascular status: hemodynamically stable and blood pressure returned to baseline  Respiratory status: acceptable  Hydration status: euvolemic

## 2020-03-05 NOTE — PROGRESS NOTES
TAVR VALVE TEAM PROGRESS NOTE    Reason for follow up: POD # 1 s/p TAVR for severe symptomatic AS using right groin cutdown approach    Subjective: Feels good today. No CP or SOB. Denies pain in both groins. Denies feeling lightheaded. Objective: No distress. No events overnight. Has been OOB to bathroom, not in hallways yet. Had episode of hypotension with SBP down to 74 mmHg, but she was asymptomatic. Scheduled Meds:   sodium chloride  250 mL Intravenous Once    vitamin B and C  1 tablet Oral Daily    calcium elemental  500 mg Oral Daily    furosemide  20 mg Oral Daily    magnesium oxide  400 mg Oral Daily    metoprolol succinate  25 mg Oral Daily    potassium chloride  20 mEq Oral Daily    vitamin C  500 mg Oral Daily    vitamin E  1,000 Units Oral Daily    sodium chloride flush  10 mL Intravenous 2 times per day    aspirin  81 mg Oral Daily    ceFAZolin (ANCEF) IVPB  2 g Intravenous Q8H    atorvastatin  10 mg Oral Nightly       Continuous Infusions:   phenylephrine (TAB-SYNEPHRINE) 50mg/250mL infusion             Intake/Output Summary (Last 24 hours) at 3/5/2020 1414  Last data filed at 3/5/2020 0454  Gross per 24 hour   Intake 2115 ml   Output 700 ml   Net 1415 ml       Patient Vitals for the past 96 hrs (Last 3 readings):   Weight   03/04/20 0811 138 lb (62.6 kg)          PE:   BP 90/60   Pulse 82   Temp 98.2 °F (36.8 °C)   Resp 16   Ht 5' 6\" (1.676 m)   Wt 138 lb (62.6 kg)   SpO2 98%   BMI 22.27 kg/m²   CONST: In general, this is a well developed,  female who appears of stated age. Awake, alert, cooperative, no apparent distress. Throat: Oral mucosa pink and moist.  HEENT: Head- normocephalic, atraumatic; Eyes:conjunctivae pink, no noted xanthomas. Neck: no stridor, no noted enlargement of the thyroid,symmetric, no tenderness, no jugular venous distention. No carotid bruit noted. CHEST: Symmetrical and non-tender to palpation.  No noted accessory muscle use or intercostal retractions. LUNGS: CTAB. No wheezes, crackles or rhonchi. CARDIOVASCULAR:   Heart Inspection shows no noted pulsations  Heart Palpation: no heaves or thrills, PMI in 5th ISC near left midclavicular line   Heart Ausculation -Normal S1 and S2, RRR, no murmur. PV: No lower extremity edema. No varicosities. Pedal pulses palpable, no clubbing or cyanosis   ABDOMEN: Soft, non-tender to light palpation. Bowel sounds present. No palpable masses no hepatosplenomegaly or splenomegaly; no abdominal bruit / pulsation  MS: Good muscle strength and tone. Not atrophy or abnormal movements. : deferred. SKIN: Warm and dry no statis dermatitis or ulcers. NEURO / PSYCH: Oriented to person, place and time. Speech clear and appropriate. Follows all commands. Pleasant affect. B/L groins: minimal ecchymoses. No hematoma or pulsatile masses. Distal pulses normal b/l. Dermabond dressing in right groin. Monitor: NSR. No high grade AVB. Lab Review     Recent Labs     03/04/20  1250 03/05/20  0438 03/05/20  0744   WBC 10.2 10.0 12.3*   HGB 10.6* 9.7* 11.1*   HCT 35.2 32.7* 37.1    196 224       Recent Labs     03/04/20  1250 03/04/20  1821 03/05/20  0438 03/05/20  0744     --  138 137   K 3.6 4.2 5.0 5.0     --  102 100   CO2 23  --  25 24   BUN 16  --  19 19   CREATININE 0.6  --  0.8 0.7       No results for input(s): AST, ALT, ALB, BILIDIR, ALKPHOS in the last 72 hours. No results for input(s): BNP, CKTOTAL, CKMB in the last 72 hours. No results for input(s): BNP, INR in the last 72 hours. EKG today: NSR    Summary of pertinent history:  1. 68 y.o. female admitted after elective TAVR for severe symptomatic AS. 2. Transfemoral transcatheter aortic valve replacement(TAVR) using a 23 mm Leong Chantel S3 valve. 3. Post procedure GERTRUDIS: EF: 30%; post TAVR: minimal PVR      Assessment:  1. POD # 1 s/p TAVR for severe AS. Hemodynamically stable.    2. Stable groin sites

## 2020-03-06 VITALS
HEIGHT: 66 IN | RESPIRATION RATE: 20 BRPM | WEIGHT: 138 LBS | HEART RATE: 82 BPM | BODY MASS INDEX: 22.18 KG/M2 | OXYGEN SATURATION: 96 % | TEMPERATURE: 98.2 F | SYSTOLIC BLOOD PRESSURE: 105 MMHG | DIASTOLIC BLOOD PRESSURE: 60 MMHG

## 2020-03-06 LAB
ANION GAP SERPL CALCULATED.3IONS-SCNC: 12 MMOL/L (ref 7–16)
BUN BLDV-MCNC: 20 MG/DL (ref 8–23)
CALCIUM SERPL-MCNC: 8.6 MG/DL (ref 8.6–10.2)
CHLORIDE BLD-SCNC: 99 MMOL/L (ref 98–107)
CO2: 25 MMOL/L (ref 22–29)
CREAT SERPL-MCNC: 0.6 MG/DL (ref 0.5–1)
GFR AFRICAN AMERICAN: >60
GFR NON-AFRICAN AMERICAN: >60 ML/MIN/1.73
GLUCOSE BLD-MCNC: 105 MG/DL (ref 74–99)
HCT VFR BLD CALC: 32.3 % (ref 34–48)
HEMOGLOBIN: 9.8 G/DL (ref 11.5–15.5)
MCH RBC QN AUTO: 25.9 PG (ref 26–35)
MCHC RBC AUTO-ENTMCNC: 30.3 % (ref 32–34.5)
MCV RBC AUTO: 85.4 FL (ref 80–99.9)
PDW BLD-RTO: 16.3 FL (ref 11.5–15)
PLATELET # BLD: 192 E9/L (ref 130–450)
PMV BLD AUTO: 10.8 FL (ref 7–12)
POTASSIUM SERPL-SCNC: 4.2 MMOL/L (ref 3.5–5)
RBC # BLD: 3.78 E12/L (ref 3.5–5.5)
SODIUM BLD-SCNC: 136 MMOL/L (ref 132–146)
WBC # BLD: 8.9 E9/L (ref 4.5–11.5)

## 2020-03-06 PROCEDURE — 2580000003 HC RX 258: Performed by: PHYSICIAN ASSISTANT

## 2020-03-06 PROCEDURE — 99239 HOSP IP/OBS DSCHRG MGMT >30: CPT | Performed by: INTERNAL MEDICINE

## 2020-03-06 PROCEDURE — 6370000000 HC RX 637 (ALT 250 FOR IP): Performed by: PHYSICIAN ASSISTANT

## 2020-03-06 PROCEDURE — 93005 ELECTROCARDIOGRAM TRACING: CPT | Performed by: PHYSICIAN ASSISTANT

## 2020-03-06 PROCEDURE — 6360000002 HC RX W HCPCS: Performed by: PHYSICIAN ASSISTANT

## 2020-03-06 PROCEDURE — 85027 COMPLETE CBC AUTOMATED: CPT

## 2020-03-06 PROCEDURE — 36415 COLL VENOUS BLD VENIPUNCTURE: CPT

## 2020-03-06 PROCEDURE — 80048 BASIC METABOLIC PNL TOTAL CA: CPT

## 2020-03-06 RX ADMIN — MAGNESIUM GLUCONATE 500 MG ORAL TABLET 400 MG: 500 TABLET ORAL at 09:57

## 2020-03-06 RX ADMIN — Medication 1000 UNITS: at 09:59

## 2020-03-06 RX ADMIN — ASPIRIN 81 MG: 81 TABLET, COATED ORAL at 09:57

## 2020-03-06 RX ADMIN — POTASSIUM CHLORIDE 20 MEQ: 1500 TABLET, EXTENDED RELEASE ORAL at 09:58

## 2020-03-06 RX ADMIN — FUROSEMIDE 20 MG: 20 TABLET ORAL at 09:59

## 2020-03-06 RX ADMIN — CALCIUM 500 MG: 500 TABLET ORAL at 09:57

## 2020-03-06 RX ADMIN — CEFAZOLIN SODIUM 2 G: 2 SOLUTION INTRAVENOUS at 04:58

## 2020-03-06 RX ADMIN — VITAMIN C 1 TABLET: TAB at 09:57

## 2020-03-06 RX ADMIN — Medication 10 ML: at 09:58

## 2020-03-06 RX ADMIN — Medication 500 MG: at 09:57

## 2020-03-06 ASSESSMENT — PAIN SCALES - GENERAL: PAINLEVEL_OUTOF10: 0

## 2020-03-06 NOTE — PLAN OF CARE
Problem:  Activity:  Goal: Capacity to carry out activities will improve, regarding daily activities  Description  Capacity to carry out activities will improve, regarding daily activities  3/6/2020 1209 by Hilario Ross RN  Outcome: Completed  3/6/2020 0033 by Balbir Rizo RN  Outcome: Met This Shift  Goal: Ability to implement measures to reduce episodes of fatigue will improve  Description  Ability to implement measures to reduce episodes of fatigue will improve  3/6/2020 1209 by Hilario Ross RN  Outcome: Completed  3/6/2020 0033 by Balbir Rizo RN  Outcome: Met This Shift     Problem: Cardiac:  Goal: Ability to achieve and maintain adequate cardiopulmonary perfusion will improve  Description  Ability to achieve and maintain adequate cardiopulmonary perfusion will improve  3/6/2020 1209 by Hilario Ross RN  Outcome: Completed  3/6/2020 0033 by Balbir Rizo RN  Outcome: Met This Shift  Goal: Ability to maintain an adequate cardiac output will be supported  Description  Ability to maintain an adequate cardiac output will be supported  3/6/2020 1209 by Hilario Ross RN  Outcome: Completed  3/6/2020 0033 by Balbir Rizo RN  Outcome: Met This Shift  Goal: Will show no evidence of cardiac arrhythmias  Description  Will show no evidence of cardiac arrhythmias  3/6/2020 1209 by Hilario Rsos RN  Outcome: Completed  3/6/2020 0033 by Balbir Rizo RN  Outcome: Met This Shift     Problem: Coping:  Goal: Ability to identify and develop effective coping behavior will improve  Description  Ability to identify and develop effective coping behavior will improve  3/6/2020 1209 by Hilario Ross RN  Outcome: Completed  3/6/2020 0033 by Balibr Rizo RN  Outcome: Met This Shift  Goal: Level of anxiety will decrease  Description  Level of anxiety will decrease  3/6/2020 1209 by Hilario Ross RN  Outcome: Completed  3/6/2020 0033 by Balbir Rizo RN  Outcome: Met This Shift     Problem: by Yuri Ocampo RN  Outcome: Completed  3/6/2020 0033 by Jackie More RN  Outcome: Met This Shift     Problem: Falls - Risk of:  Goal: Will remain free from falls  Description  Will remain free from falls  3/6/2020 1209 by Yuri Ocampo RN  Outcome: Completed  3/6/2020 0033 by Jackie More RN  Outcome: Met This Shift  Goal: Absence of physical injury  Description  Absence of physical injury  3/6/2020 1209 by Yuri Ocampo RN  Outcome: Completed  3/6/2020 0033 by Jackie More RN  Outcome: Met This Shift     Problem: Bleeding:  Goal: Will show no signs and symptoms of excessive bleeding  Description  Will show no signs and symptoms of excessive bleeding  3/6/2020 1209 by Yuri Ocampo RN  Outcome: Completed  3/6/2020 0033 by Jackie More RN  Outcome: Met This Shift

## 2020-03-07 LAB
EKG ATRIAL RATE: 79 BPM
EKG P AXIS: 50 DEGREES
EKG P-R INTERVAL: 148 MS
EKG Q-T INTERVAL: 404 MS
EKG QRS DURATION: 88 MS
EKG QTC CALCULATION (BAZETT): 463 MS
EKG R AXIS: -12 DEGREES
EKG T AXIS: 173 DEGREES
EKG VENTRICULAR RATE: 79 BPM

## 2020-03-07 PROCEDURE — 93010 ELECTROCARDIOGRAM REPORT: CPT | Performed by: INTERNAL MEDICINE

## 2020-03-08 LAB
BLOOD BANK DISPENSE STATUS: NORMAL
BLOOD BANK PRODUCT CODE: NORMAL
BPU ID: NORMAL
DESCRIPTION BLOOD BANK: NORMAL

## 2020-03-09 ENCOUNTER — TELEPHONE (OUTPATIENT)
Dept: FAMILY MEDICINE CLINIC | Age: 70
End: 2020-03-09

## 2020-03-09 ENCOUNTER — OFFICE VISIT (OUTPATIENT)
Dept: FAMILY MEDICINE CLINIC | Age: 70
End: 2020-03-09

## 2020-03-09 ENCOUNTER — TELEPHONE (OUTPATIENT)
Dept: CARDIOLOGY | Age: 70
End: 2020-03-09

## 2020-03-09 VITALS
TEMPERATURE: 97.2 F | HEART RATE: 83 BPM | HEIGHT: 66 IN | OXYGEN SATURATION: 96 % | WEIGHT: 146 LBS | BODY MASS INDEX: 23.46 KG/M2 | DIASTOLIC BLOOD PRESSURE: 60 MMHG | SYSTOLIC BLOOD PRESSURE: 90 MMHG

## 2020-03-09 PROCEDURE — 99213 OFFICE O/P EST LOW 20 MIN: CPT | Performed by: FAMILY MEDICINE

## 2020-03-09 NOTE — TELEPHONE ENCOUNTER
Patient left  a voicemail this morning stating that she was requesting a follow up visit with Dr. Murphy Sharma for soon, left patient a message to call back at her earliest convenience, notified her who I was, requested that she call back and press option one for our scheduling department, or that she may press option two and ask for me specifically for further assistance.

## 2020-03-09 NOTE — TELEPHONE ENCOUNTER
Spoke to patient. She is doing very well since discharge last week. She notes significant improvement in her breathing. No other complaints or concerns. Reminded of valve clinic follow up next week and advised to call with concerns in the meantime.

## 2020-03-09 NOTE — PROGRESS NOTES
[facility]  Office Progress Note - Dr. Red Drivers  3/9/20    CC:   Chief Complaint   Patient presents with    Follow Up After Procedure     heart valve replacement        S: spent 2 days in hospital bc of low BP on Day 1 postop. Discharged on day 2 postop. She reports she is feeling well. No problems since returning home. Taking medications as prescribed. Shortness of breath improved. Physical activity improved. No problems at incision sites. No drainage. No swelling. No bruising. Dermabond in place. .   From Julia Carrillo, cardiology during patient discharge evaluation:  Summary of pertinent history:  1. 68 y.o. female admitted after elective TAVR for severe symptomatic AS. 2. Transfemoral transcatheter aortic valve replacment(TAVR) using a 23 mm Leong Chantel S3 valve  3. Post procedure GERTRUDIS: EF: 30%; post TAVR: minimal PVR     Assessment:  1. POD # 2 TAVR for severe AS. Hemodynamically stable. 2. Stable groin sites bilaterally with no hematoma or pulsatile masses. 3. Normal renal function and good UOP. 4. Maintaining SR post procedure. 5. Stable H/H post procedure.      Plan:  1. Continue present meds. 2. Discharge home today  3.  Follow up appts in Valve Clinic on 3/19/20 at 11:00 am for an incision check and 4/9/20 at 2:30 pm for an echo  Past Medical History:   Diagnosis Date    Aortic stenosis, severe     Arthritis     Heart murmur     Heart murmur     Since birth    HFrEF (heart failure with reduced ejection fraction) ([facility] [address] 02/28/2020 2/25/20- echo- LVEF 35-40%, stage II DD, moderately enlarged LA, mild MR, mild TR, critical AS, LVDD: 5.0, RVDD: 2.4    History of blood transfusion     Hypertension     Pleural effusion, bilateral 02/25/2020    Varicosities of leg        Family History   Problem Relation Age of Onset    High Blood Pressure Mother     Heart Disease Father     Heart Disease Brother         cabg    Heart Disease Sister     No Known Problems Brother     Other Sister         Tumor, unsure, possibly uterine . Past Surgical History:   Procedure Laterality Date    AORTIC VALVE REPLACEMENT N/A 3/4/2020    TRANSCATHETER AORTIC VALVE REPLACEMENT FEMORAL APPROACH performed by Danilo Parrish MD at [address] Right In her 46s. after a fall. Dr. Mervat Banda, [place]    THORACENTESIS Right 02/27/2020    1000 cc    THORACENTESIS Left 02/28/2020    425 cc    TRANSESOPHAGEAL ECHOCARDIOGRAM N/A 3/4/2020    GERTRUDIS performed by Danilo Parrish MD at [address] [facility] History     Tobacco Use    Smoking status: Never Smoker    Smokeless tobacco: Never Used   Substance Use Topics    Alcohol use: No    Drug use: No       ROS:  No CP, No palpitations,   No sob, No cough,   No abd pain, No heartburn,   No headaches,   No tingling, No numbness, No weakness,   No bowel changes, No hematochezia, No melena,  No bladder changes, No hematuria  No skin rashes, No skin lesions. No vision changes, No hearing changes,   No polyuria, polydipsia, polyphagia. Stable mood. ROS otherwise negative unless as listed in HPI. Chart reviewed and updated where appropriate for PMH, Fam, and Soc Hx. Physical Exam   BP 90/60 (Site: Left Upper Arm, Position: Sitting, Cuff Size: Medium Adult)   Pulse 83   Temp 97.2 °F (36.2 °C) (Temporal)   Ht 5' 6\" (1.676 m)   Wt 146 lb (66.2 kg)   SpO2 96%   BMI 23.57 kg/m²   Wt Readings from Last 3 Encounters:   03/09/20 146 lb (66.2 kg)   03/04/20 138 lb (62.6 kg)   03/02/20 138 lb (62.6 kg)       Constitutional:    She is oriented to person, place, and time. She appears well-developed and well-nourished. Neck:    Normal range of motion. No thyromegaly or nodules noted. No bruit. Cardiovascular:    Normal rate, regular rhythm and normal heart sounds. 2 out of 6 systolic murmur, right and left upper sternal border. No gallop and no friction rub.    Pulmonary/Chest:    Effort today for follow up after procedure. Diagnoses and all orders for this visit:    Critical aortic valve stenosis    Status post transcatheter aortic valve replacement    Doing very well after aortic valve replacement. No problems identified. Medication reconciliation completed. Taking medications appropriately. She will follow-up with the heart valve clinic next week. Call with any problems or concerns in the meantime. Return in about 6 months (around 9/9/2020), or if symptoms worsen or fail to improve. Patient counseled to follow up sooner or seek more acute care if symptoms worsening. Electronically signed by Ibis Diop MD on 3/9/2020    This note may have been created using dictation software.  Efforts were made to reduce grammatical or syntax errors, but some may persist.

## 2020-03-19 ENCOUNTER — TELEPHONE (OUTPATIENT)
Dept: CARDIOLOGY | Age: 70
End: 2020-03-19

## 2020-03-19 NOTE — TELEPHONE ENCOUNTER
Patient notified of need to cancel valve clinic appointment for 3/19/20 due to corona virus concerns. She states understanding. She denies any issues/concerns with her incision. No other complaints and she is feeling well. She was advised to call with any questions prior to next valve clinic follow up on 4/9/20.

## 2020-03-30 ENCOUNTER — TELEPHONE (OUTPATIENT)
Dept: FAMILY MEDICINE CLINIC | Age: 70
End: 2020-03-30

## 2020-03-30 NOTE — TELEPHONE ENCOUNTER
Patient calling in stated she had heart surgery on 3/4/2020 wants to know if she should still be taking the metoprolol she is not sure if she should get a refill on it or wait to discuss it at her appt with you., please advise.

## 2020-04-09 ENCOUNTER — TELEPHONE (OUTPATIENT)
Dept: CARDIOLOGY CLINIC | Age: 70
End: 2020-04-09

## 2020-04-09 NOTE — TELEPHONE ENCOUNTER
We see that you have an appointment scheduled, given the current COVID-19 pandemic, Cindy Tran would like to change that visit to a video visit. In order to schedule this appointment you will need a camera-enabled device (smart phone, tablet or computer) and reliable WiFi. Do you wish to schedule this appointment? Yes    We want to confirm that, for purposes of billing, this is a virtual visit with your provider for which we will submit a claim for reimbursement with your insurance company. You will be responsible for any copays, coinsurance amounts or other amounts not covered by your insurance company. If you do not accept this, unfortunately we will not be able to schedule a virtual visit with the provider. Do you accept?   Yes

## 2020-04-10 ENCOUNTER — TELEPHONE (OUTPATIENT)
Dept: CARDIOLOGY CLINIC | Age: 70
End: 2020-04-10

## 2020-04-10 ENCOUNTER — TELEPHONE (OUTPATIENT)
Dept: NON INVASIVE DIAGNOSTICS | Age: 70
End: 2020-04-10

## 2020-04-10 NOTE — TELEPHONE ENCOUNTER
I spoke to Madiha's daughter Ang Castañeda to review the [employer] video instructions. She had much difficulty connecting to the web page despite multiple attempts. I Informed GLORIA Waller & the decision was made to convert the video visit to a telephone visit. Jose Antonio Piedra notified.  Will call pt tomorrow at 9:4 to review meds & vitals

## 2020-04-14 ENCOUNTER — TELEPHONE (OUTPATIENT)
Dept: ADMINISTRATIVE | Age: 70
End: 2020-04-14

## 2020-04-14 LAB
AFB CULTURE (MYCOBACTERIA): NORMAL
AFB SMEAR: NORMAL

## 2020-04-30 ENCOUNTER — TELEPHONE (OUTPATIENT)
Dept: CARDIOLOGY | Age: 70
End: 2020-04-30

## 2020-04-30 RX ORDER — METOPROLOL SUCCINATE 25 MG/1
25 TABLET, EXTENDED RELEASE ORAL DAILY
Qty: 30 TABLET | Refills: 3 | Status: SHIPPED | OUTPATIENT
Start: 2020-04-30 | End: 2020-06-02 | Stop reason: ALTCHOICE

## 2020-05-13 ENCOUNTER — TELEPHONE (OUTPATIENT)
Dept: NON INVASIVE DIAGNOSTICS | Age: 70
End: 2020-05-13

## 2020-05-14 ENCOUNTER — HOSPITAL ENCOUNTER (OUTPATIENT)
Dept: NON INVASIVE DIAGNOSTICS | Age: 70
Discharge: HOME | End: 2020-05-14

## 2020-05-14 VITALS
TEMPERATURE: 97.8 F | BODY MASS INDEX: 23.32 KG/M2 | HEART RATE: 96 BPM | DIASTOLIC BLOOD PRESSURE: 70 MMHG | HEIGHT: 65 IN | WEIGHT: 140 LBS | SYSTOLIC BLOOD PRESSURE: 117 MMHG

## 2020-05-14 LAB
LV EF: 60 %
LVEF MODALITY: NORMAL

## 2020-05-14 PROCEDURE — 93306 TTE W/DOPPLER COMPLETE: CPT

## 2020-05-14 PROCEDURE — 99211 OFF/OP EST MAY X REQ PHY/QHP: CPT

## 2020-05-20 ENCOUNTER — TELEPHONE (OUTPATIENT)
Dept: CARDIOLOGY | Age: 70
End: 2020-05-20

## 2020-06-02 ENCOUNTER — HOSPITAL ENCOUNTER (OUTPATIENT)
Age: 70
Discharge: HOME | End: 2020-06-04

## 2020-06-02 ENCOUNTER — OFFICE VISIT (OUTPATIENT)
Dept: CARDIOLOGY CLINIC | Age: 70
End: 2020-06-02

## 2020-06-02 VITALS
SYSTOLIC BLOOD PRESSURE: 118 MMHG | RESPIRATION RATE: 16 BRPM | DIASTOLIC BLOOD PRESSURE: 70 MMHG | BODY MASS INDEX: 22.48 KG/M2 | WEIGHT: 139.9 LBS | HEART RATE: 115 BPM | HEIGHT: 66 IN

## 2020-06-02 LAB
ANION GAP SERPL CALCULATED.3IONS-SCNC: 14 MMOL/L (ref 7–16)
BUN BLDV-MCNC: 15 MG/DL (ref 8–23)
CALCIUM SERPL-MCNC: 9.8 MG/DL (ref 8.6–10.2)
CHLORIDE BLD-SCNC: 101 MMOL/L (ref 98–107)
CO2: 27 MMOL/L (ref 22–29)
CREAT SERPL-MCNC: 0.6 MG/DL (ref 0.5–1)
GFR AFRICAN AMERICAN: >60
GFR NON-AFRICAN AMERICAN: >60 ML/MIN/1.73
GLUCOSE BLD-MCNC: 95 MG/DL (ref 74–99)
HCT VFR BLD CALC: 36.9 % (ref 34–48)
HEMOGLOBIN: 11.2 G/DL (ref 11.5–15.5)
MCH RBC QN AUTO: 26 PG (ref 26–35)
MCHC RBC AUTO-ENTMCNC: 30.4 % (ref 32–34.5)
MCV RBC AUTO: 85.8 FL (ref 80–99.9)
PDW BLD-RTO: 16 FL (ref 11.5–15)
PLATELET # BLD: 381 E9/L (ref 130–450)
PMV BLD AUTO: 10.7 FL (ref 7–12)
POTASSIUM SERPL-SCNC: 4.7 MMOL/L (ref 3.5–5)
PRO-BNP: 568 PG/ML (ref 0–125)
RBC # BLD: 4.3 E12/L (ref 3.5–5.5)
SODIUM BLD-SCNC: 142 MMOL/L (ref 132–146)
WBC # BLD: 8.1 E9/L (ref 4.5–11.5)

## 2020-06-02 PROCEDURE — 85027 COMPLETE CBC AUTOMATED: CPT

## 2020-06-02 PROCEDURE — 83880 ASSAY OF NATRIURETIC PEPTIDE: CPT

## 2020-06-02 PROCEDURE — 80048 BASIC METABOLIC PNL TOTAL CA: CPT

## 2020-06-02 PROCEDURE — 93000 ELECTROCARDIOGRAM COMPLETE: CPT | Performed by: INTERNAL MEDICINE

## 2020-06-02 PROCEDURE — 99213 OFFICE O/P EST LOW 20 MIN: CPT | Performed by: INTERNAL MEDICINE

## 2020-06-02 NOTE — PROGRESS NOTES
[facility]  Matador Suni. Radha Quinn M.D. Sharifa Perkins M.D.  Aviva Cruz. Soraida Keane M.D. Jama Yates M.D. Ander Phalen, Rogelio Sanger, M.D. MD Jamal Xiechelsey Rgshirley     Buffy Lucia MD      This 24-year-old woman is seen for outpatient cardiac follow-up today. She has a history of severe aortic valve stenosis and is s/p TAVR 2020. She has been followed at the [facility] valve clinic. In response to dizziness when bending over she had discontinuation of Lasix and eventually beta-blocker. She continues to have some dizziness when bending over for several minutes although she has not had syncope presyncope or falling. She rarely experiences any dyspnea. She has no chest pain orthopnea or PND. Medical History:  1. History of heart murmur. 2. Varicosities of leg. 3. No history of MI, CHF, CVA, diabetes, CKD  4. Echo 2017: Aortic valve this 24-year-old woman is seen for outpatient cardiac calcified and restricted. AV peak velocity 5.23M/S. AV mean gradient 69.7. AV area 0.56 cm 2. Dimensionless index 0.17. Normal EF. Left atrium dilated. Patient declined further cardiac evaluation. 5. Surgical history:  and right hip fracture  6. presented to [facility] on 2020 for further evaluation of progressive dyspnea on exertion (now occurring with mild levels of exertion over the past 2-3 weeks), LE edema, and orthopnea  proBNP 12684 proBNP 67178  7. Echo 2020: EF 35-40%. LVH. Left atrium moderately dilated. Aortic valve calcified with peak velocity 5.4M/S, mean gradient 75 mmHg and aortic valve area 0.5 cm 2.  8. Coronary arteriogram 2020. No hemodynamically significant disease  9. S/p TAVR 2020: 23mm Leong Chantel S3 valve. 10. Echo 2020: Normal EF. No LV dilatation. Left atrium moderately dilated. Mild MR. TAVR. #23. Mean gradient 21mmHg. RVSP 51 mmHg.     Review of Systems:  Constitutional: negative for fever and chills  Respiratory: negative for cough and hemoptysis  Cardiovascular:   Gastrointestinal: negative for abdominal pain, diarrhea, nausea and vomiting  Genitourinary:negative for dysuria and hematuria  Derm: negative for rash and skin lesion(s)  Neurological: negative for seizures and tremors  Endocrine: negative for diabetic symptoms including polydipsia and polyuria  Musculoskeletal: negative for CTD  Psychiatric: negative for psychosis and major depression    On examination, she is an alert pleasant elderly woman in no distress   skin is warm and dry. Respirations are unlabored. /70   Pulse 115   Resp 16   Ht 5' 6\" (1.676 m)   Wt 139 lb 14.4 oz (63.5 kg)   BMI 22.58 kg/m² . HEENT negative for scleral icterus. Extraocular muscles intact. No facial asymmetry or central cyanosis. Neck without masses or goiter. No bruit or JVD. Cardiac apex not displaced. Grade 2 ZAC upper left sternal border. Short decrescendo diastolic murmur. Rhythm regular. Abdomen normal.  Extremities without edema. Lungs are clear    EKG today shows sinus tachycardia 115. LVH with secondary ST and T changes. The patient's symptoms and blood pressure are mildly improved compared to previously. She has a history of pulmonary hypertension. She has also history of anemia and has been on thyroid medication. An echocardiogram, BMP CBC and echo will be rechecked.     At completion of today's visit, medications include the following:    Current Outpatient Medications:     NONFORMULARY, NONFORMULARY NONFORMULARY Indications: hawthorn berry, soy lecithin, kelp white oak bark, lira seal root Indications: hawthorn berry, soy lecithin, kelp white oak bark, lira seal root 0 Active  Historical Provider Historical Provider Raghav 28 ([id number]), Disp: , Rfl:     magnesium oxide (MAG-OX) 400 MG tablet, Take 400 mg by mouth daily, Disp: , Rfl:     calcium carbonate (OSCAL) 500 MG TABS tablet, Take 500 mg by mouth daily Indications: PT TAKES IT 3 X WEEKLY , Disp: , Rfl:     Vitamin E 400 units TABS, Take 400 Units by mouth 2 times daily , Disp: , Rfl:     Coenzyme Q10 (CO Q-10) 200 MG CAPS, Take 200 mg by mouth 2 times daily , Disp: , Rfl:     vitamin C (ASCORBIC ACID) 500 MG tablet, Take 500 mg by mouth daily, Disp: , Rfl:     b complex vitamins capsule, Take 1 capsule by mouth daily, Disp: , Rfl:     Cod Liver Oil 1000 MG CAPS, Take by mouth, Disp: , Rfl:       Note: This report was completed utilizing computer voice recognition software. Every effort has been made to ensure accuracy, however; inadvertent computerized transcription errors may be present.     --Maya Lofton MD on 6/2/2020 at 10:57 AM

## 2020-06-23 RX ORDER — AMOXICILLIN 500 MG/1
CAPSULE ORAL
Qty: 4 CAPSULE | Refills: 1 | Status: SHIPPED | OUTPATIENT
Start: 2020-06-23 | End: 2020-07-11 | Stop reason: ALTCHOICE

## 2020-07-11 ENCOUNTER — OFFICE VISIT (OUTPATIENT)
Dept: FAMILY MEDICINE CLINIC | Age: 70
End: 2020-07-11

## 2020-07-11 VITALS
DIASTOLIC BLOOD PRESSURE: 66 MMHG | BODY MASS INDEX: 22.5 KG/M2 | HEIGHT: 66 IN | TEMPERATURE: 97.6 F | OXYGEN SATURATION: 96 % | WEIGHT: 140 LBS | HEART RATE: 76 BPM | SYSTOLIC BLOOD PRESSURE: 110 MMHG

## 2020-07-11 PROCEDURE — 99213 OFFICE O/P EST LOW 20 MIN: CPT | Performed by: FAMILY MEDICINE

## 2020-07-11 NOTE — PROGRESS NOTES
OFFICE NOTE    7/11/20  Name: Batsheva Keane  QJV:[id number]   Sex:female   Age:69 y.o. SUBJECTIVE  Chief Complaint   Patient presents with    Otalgia       HPI Left ear pain for a few days. Chiropractor thought it looked infected. Hurts to chew a little    Review of Systems  Was using q tip, some dark material came out.  No fever, sinus drainage        Current Outpatient Medications:     neomycin-polymyxin-hydrocortisone (CORTISPORIN) 3.5-72727-2 otic solution, Place 4 drops into the left ear 2 times daily for 10 days Instill into left Ear, Disp: 1 Bottle, Rfl: 1    NONFORMULARY, NONFORMULARY NONFORMULARY Indications: hawthorn berry, soy lecithin, kelp white oak bark, lira seal root Indications: hawthorn berry, soy lecithin, kelp white oak bark, lira seal root 0 Active  Historical Provider Historical Provider Raghav 28 ([id number]), Disp: , Rfl:     magnesium oxide (MAG-OX) 400 MG tablet, Take 400 mg by mouth daily, Disp: , Rfl:     calcium carbonate (OSCAL) 500 MG TABS tablet, Take 500 mg by mouth daily Indications: PT TAKES IT 3 X WEEKLY , Disp: , Rfl:     Vitamin E 400 units TABS, Take 400 Units by mouth 2 times daily , Disp: , Rfl:     Coenzyme Q10 (CO Q-10) 200 MG CAPS, Take 200 mg by mouth 2 times daily , Disp: , Rfl:     vitamin C (ASCORBIC ACID) 500 MG tablet, Take 500 mg by mouth daily, Disp: , Rfl:     b complex vitamins capsule, Take 1 capsule by mouth daily, Disp: , Rfl:     Cod Liver Oil 1000 MG CAPS, Take by mouth, Disp: , Rfl:   No Known Allergies    Past Medical History:   Diagnosis Date    Aortic stenosis, severe     Arthritis     Heart murmur     Heart murmur     Since birth    HFrEF (heart failure with reduced ejection fraction) ([facility] [address] 02/28/2020 2/25/20- echo- LVEF 35-40%, stage II DD, moderately enlarged LA, mild MR, mild TR, critical AS, LVDD: 5.0, RVDD: 2.4    History of blood transfusion     Hypertension     Pleural effusion, bilateral 02/25/2020    orders for this visit:    Other infective acute otitis externa of left ear    Other orders  -     neomycin-polymyxin-hydrocortisone (CORTISPORIN) 3.5-79010-5 otic solution; Place 4 drops into the left ear 2 times daily for 10 days Instill into left Ear    Keep ear dry. If fever. Otorrhea, sinusitis needs seen. Declined methyl pred shot for ETD, will try nasal decongestant for 2-3 days followed by valsalva      Return if symptoms worsen or fail to improve.     Electronically signed by Lupillo Oseguera MD on 7/11/20 at 10:42 AM EDT

## 2020-08-04 ENCOUNTER — TELEPHONE (OUTPATIENT)
Dept: CARDIOLOGY | Age: 70
End: 2020-08-04

## 2020-08-04 NOTE — TELEPHONE ENCOUNTER
CALLED PATIENT TO SCHEDULED AN ECHO. PATIENT WOULD LIKE TO HOLD OFF FOR A FEW WEEKS BEFORE WE SCHEDULE.     Electronically signed by Dayanaraanthony Edmondson on 8/4/2020 at 11:52 AM

## 2020-08-05 ENCOUNTER — TELEPHONE (OUTPATIENT)
Dept: CARDIOLOGY CLINIC | Age: 70
End: 2020-08-05

## 2020-09-03 ENCOUNTER — OFFICE VISIT (OUTPATIENT)
Dept: FAMILY MEDICINE CLINIC | Age: 70
End: 2020-09-03

## 2020-09-03 VITALS
SYSTOLIC BLOOD PRESSURE: 122 MMHG | DIASTOLIC BLOOD PRESSURE: 80 MMHG | HEART RATE: 78 BPM | OXYGEN SATURATION: 97 % | WEIGHT: 139 LBS | TEMPERATURE: 97.5 F | BODY MASS INDEX: 22.44 KG/M2

## 2020-09-03 PROCEDURE — 99213 OFFICE O/P EST LOW 20 MIN: CPT | Performed by: FAMILY MEDICINE

## 2020-09-03 RX ORDER — CIPROFLOXACIN HYDROCHLORIDE 3.5 MG/ML
SOLUTION/ DROPS TOPICAL
Qty: 5 ML | Refills: 0 | Status: SHIPPED
Start: 2020-09-03 | End: 2020-09-10

## 2020-09-03 NOTE — PROGRESS NOTES
[facility]  Office Progress Note - Dr. Darlene Ozuna  9/3/20    CC:   Chief Complaint   Patient presents with   Caterina Ripple     left ear, hearing loss and pain. since july        HPI:   CC- ear pain  Left otalgia  Initially started in July  Did have a teeth extracted that time  Was prescribed ear drops for suspicious external otitis  That did not seem to help much, as patient continued to have ear pain  It's not constant, comes and goes  Feels like something is in there   Has seen some greyish drainage from the ear  Does have hx of wax impaction in the past  Reports having some hearing issues on left ear  Has tried other measures at home, alternative remedies without great success  Denies fever or chills  No sore throat, cough, sob  _________________________________________________________  Past Medical History:   Diagnosis Date    Aortic stenosis, severe     Arthritis     Heart murmur     Heart murmur     Since birth    HFrEF (heart failure with reduced ejection fraction) ([facility] [address] 02/28/2020 2/25/20- echo- LVEF 35-40%, stage II DD, moderately enlarged LA, mild MR, mild TR, critical AS, LVDD: 5.0, RVDD: 2.4    History of blood transfusion     Hypertension     Pleural effusion, bilateral 02/25/2020    Varicosities of leg        Family History   Problem Relation Age of Onset    High Blood Pressure Mother     Heart Disease Father     Heart Disease Brother         cabg    Heart Disease Sister     No Known Problems Brother     Other Sister         Tumor, unsure, possibly uterine . Past Surgical History:   Procedure Laterality Date    AORTIC VALVE REPLACEMENT N/A 3/4/2020    TRANSCATHETER AORTIC VALVE REPLACEMENT FEMORAL APPROACH performed by Rani Sal MD at [address] Right In her 46s. after a fall.     Dr. Rommel Cuadra, [place]    THORACENTESIS Right 02/27/2020    1000 cc    THORACENTESIS Left 02/28/2020    425 cc    TRANSESOPHAGEAL ECHOCARDIOGRAM N/A 3/4/2020    GERTRUDIS performed by Amy Medellin MD at [address] History     Tobacco Use    Smoking status: Never Smoker    Smokeless tobacco: Never Used   Substance Use Topics    Alcohol use: No    Drug use: No     _________________________________________________________  ROS: POSITIVE:ear pain, hearing change. Otherwise:  No CP, No palpitations,   No sob, No cough,   No abd pain, No heartburn,   No headaches, No vision changes, No hearing changes,   No tingling, No numbness, No weakness,   No bowel changes, No hematochezia, No melena,  No bladder changes, No hematuria  No skin rashes, No skin lesions. No polyuria, polydipsia, polyphagia. Stable mood. ROS otherwise negative unless as listed in HPI. Chart reviewed and updated where appropriate for PMH, Fam, and Soc Hx.  __________________________________________________________  Physical Exam   /80   Pulse 78   Temp 97.5 °F (36.4 °C)   Wt 139 lb (63 kg)   SpO2 97%   BMI 22.44 kg/m²   Wt Readings from Last 3 Encounters:   09/03/20 139 lb (63 kg)   07/11/20 140 lb (63.5 kg)   06/02/20 139 lb 14.4 oz (63.5 kg)       Constitutional:    She is oriented to person, place, and time. She appears well-developed and well-nourished. HENT:    Right Ear: normal Pinna, normal canal, normal TM. Left Ear: normal Pinna, ear canal with grayish discharge, scaly and white bubbles/cotton pieces . TM difficult to be visualized due to wax impaction. Wax removal trial performed in the office. Rechecked after that, would again see some wax. TM mildly visualized, grayish in color. No effusion or erythema seen. Nose: Nose normal.    Mouth/Throat: Oropharynx is clear and moist.   Eyes:    Conjunctivae are normal.    Pupils are equal, round, and reactive to light. EOMI. Neck:    Normal range of motion. No thyromegaly or nodules noted. No bruit.  No LAD.     ________________________________________________________  Current Outpatient Medications on File Prior to Visit   Medication Sig Dispense Refill    NONFORMULARY NONFORMULARY NONFORMULARY Indications: hawthorn berry, soy lecithin, kelp white oak bark, lira seal root Indications: hawthorn berry, soy lecithin, kelp white oak bark, lira seal root 0 Active  Historical Provider Historical Provider Raghav 28 ([id number])      magnesium oxide (MAG-OX) 400 MG tablet Take 400 mg by mouth daily      calcium carbonate (OSCAL) 500 MG TABS tablet Take 500 mg by mouth daily Indications: PT TAKES IT 3 X WEEKLY       Vitamin E 400 units TABS Take 400 Units by mouth 2 times daily       Coenzyme Q10 (CO Q-10) 200 MG CAPS Take 200 mg by mouth 2 times daily       vitamin C (ASCORBIC ACID) 500 MG tablet Take 500 mg by mouth daily      b complex vitamins capsule Take 1 capsule by mouth daily      Cod Liver Oil 1000 MG CAPS Take by mouth       No current facility-administered medications on file prior to visit. Patient Active Problem List   Diagnosis Code    Heart murmur R01.1    Acute congestive heart failure (HCC) I50.9    Aortic valve stenosis I35.0    Dyspnea R06.00    Atrial flutter (HCC) I48.92    Critical aortic valve stenosis I35.0    Severe aortic stenosis I35.0    Red blood cell antibody positive R76.8      ________________________________________________________  Assessment / Plan  Dian Freitas was seen today for otalgia. Diagnoses and all orders for this visit:    Chronic otitis externa of left ear, unspecified type  -     ciprofloxacin (CILOXAN) 0.3 % ophthalmic solution; Place 4 drops into the left ear twice daily for 7 days. Will recheck in a week at her normal appt.     7 days or as scheduled. Patient counseled to follow up sooner or seek more acute care if symptoms worsening or not improving according to plan. Electronically signed by Sterling Keenan MD on 9/3/2020    This note may have been created using dictation software.  Efforts were made to reduce grammatical or syntax errors, but some may persist.

## 2020-09-10 ENCOUNTER — OFFICE VISIT (OUTPATIENT)
Dept: FAMILY MEDICINE CLINIC | Age: 70
End: 2020-09-10

## 2020-09-10 VITALS
WEIGHT: 141 LBS | DIASTOLIC BLOOD PRESSURE: 80 MMHG | SYSTOLIC BLOOD PRESSURE: 116 MMHG | BODY MASS INDEX: 22.76 KG/M2 | HEART RATE: 80 BPM | TEMPERATURE: 97.3 F | OXYGEN SATURATION: 99 %

## 2020-09-10 PROBLEM — I50.9 ACUTE CONGESTIVE HEART FAILURE (HCC): Status: RESOLVED | Noted: 2020-02-25 | Resolved: 2020-09-10

## 2020-09-10 PROBLEM — I35.0 SEVERE AORTIC STENOSIS: Status: RESOLVED | Noted: 2020-03-04 | Resolved: 2020-09-10

## 2020-09-10 PROBLEM — Z95.2 AORTIC VALVE REPLACED: Status: ACTIVE | Noted: 2020-09-10

## 2020-09-10 PROBLEM — R06.00 DYSPNEA: Status: RESOLVED | Noted: 2020-02-25 | Resolved: 2020-09-10

## 2020-09-10 PROBLEM — I35.0 AORTIC VALVE STENOSIS: Status: RESOLVED | Noted: 2020-02-25 | Resolved: 2020-09-10

## 2020-09-10 PROCEDURE — 99213 OFFICE O/P EST LOW 20 MIN: CPT | Performed by: FAMILY MEDICINE

## 2020-09-10 NOTE — PROGRESS NOTES
[facility]  Office Progress Note - Dr. Aga Davila  9/10/20    CC:   Chief Complaint   Patient presents with    Otalgia     recheck        HPI: chronic condition follow up  She is doing very well after valve replacement surgery with increased activities and exercise tolerance. She has not had SOB or dizziness. She has no problems and is feeling well. No chronic medications at this point. No CHF symptoms since replacement. No palps. Reports ear pain has improved. She used some old drops from the summer cortisporin and seems to have helped. Also heated up an onion chunk and put it in her eat. She has wet cerumen on exam.        _________________________________________________________  Past Medical History:   Diagnosis Date    Aortic stenosis, severe     Arthritis     Heart murmur     Heart murmur     Since birth    HFrEF (heart failure with reduced ejection fraction) ([facility] [address] 02/28/2020 2/25/20- echo- LVEF 35-40%, stage II DD, moderately enlarged LA, mild MR, mild TR, critical AS, LVDD: 5.0, RVDD: 2.4    History of blood transfusion     Hypertension     Pleural effusion, bilateral 02/25/2020    Varicosities of leg        Family History   Problem Relation Age of Onset    High Blood Pressure Mother     Heart Disease Father     Heart Disease Brother         cabg    Heart Disease Sister     No Known Problems Brother     Other Sister         Tumor, unsure, possibly uterine . Past Surgical History:   Procedure Laterality Date    AORTIC VALVE REPLACEMENT N/A 3/4/2020    TRANSCATHETER AORTIC VALVE REPLACEMENT FEMORAL APPROACH performed by Cory Mayo MD at [address] [place] [address] Right In her 46s. after a fall.     Dr. Navneet Ha, nidhi    THORACENTESIS Right 02/27/2020    1000 cc    THORACENTESIS Left 02/28/2020    425 cc    TRANSESOPHAGEAL ECHOCARDIOGRAM N/A 3/4/2020    GERTRUDIS performed by Cory Mayo MD at [address] [place] [address],[address] [place] [address] Tobacco Use    Smoking status: Never Smoker    Smokeless tobacco: Never Used   Substance Use Topics    Alcohol use: No    Drug use: No     _________________________________________________________  ROS: POSITIVE:  Otherwise:  No CP, No palpitations,   No sob, No cough,   No abd pain, No heartburn,   No headaches, No vision changes, No hearing changes,   No tingling, No numbness, No weakness,   No bowel changes, No hematochezia, No melena,  No bladder changes, No hematuria  No skin rashes, No skin lesions. No polyuria, polydipsia, polyphagia. Stable mood. ROS otherwise negative unless as listed in HPI. Chart reviewed and updated where appropriate for PMH, Fam, and Soc Hx.  __________________________________________________________  Physical Exam   /80   Pulse 80   Temp 97.3 °F (36.3 °C)   Wt 141 lb (64 kg)   SpO2 99%   BMI 22.76 kg/m²   Wt Readings from Last 3 Encounters:   09/10/20 141 lb (64 kg)   09/03/20 139 lb (63 kg)   07/11/20 140 lb (63.5 kg)       Constitutional:    She is oriented to person, place, and time. She appears well-developed and well-nourished. HENT:    Right Ear: normal Pinna, normal canal, normal TM. Left Ear: normal Pinna, normal canal, normal TM. Wet cerumen. Nose: Nose normal.    Mouth/Throat: Oropharynx is clear and moist.   Eyes:    Conjunctivae are normal.    Pupils are equal, round, and reactive to light. EOMI. Neck:    Normal range of motion. No thyromegaly or nodules noted. No bruit. Cardiovascular:    Normal rate, regular rhythm and normal heart sounds. 1/6 systolic murmur. No gallop and no friction rub. Pulmonary/Chest:    Effort normal and breath sounds normal.    No wheezes. No rales or rhonchi. Abdominal:    Soft. Bowel sounds are normal.    No distension. No tenderness. Musculoskeletal:    Normal range of motion. No joint swelling noted. No peripheral edema. Skin:    Skin is warm and dry.     No rashes, No lesions. Psychiatric:    She has a normal mood and affect. Normal groom and dress. No SI or HI.   ________________________________________________________  Current Outpatient Medications on File Prior to Visit   Medication Sig Dispense Refill    neomycin-polymyxin-hydrocortisone (CORTISPORIN) 3.5-19643-1 otic solution Place 4 drops into the left ear 2 times daily for 10 days Instill into left Ear 1 Bottle 1    NONFORMULARY NONFORMULARY NONFORMULARY Indications: hawthorn berry, soy lecithin, kelp white oak bark, lira seal root Indications: hawthorn berry, soy lecithin, kelp white oak bark, lira seal root 0 Active  Historical Provider Historical Provider Raghav 28 ([id number])      magnesium oxide (MAG-OX) 400 MG tablet Take 400 mg by mouth daily      calcium carbonate (OSCAL) 500 MG TABS tablet Take 500 mg by mouth daily Indications: PT TAKES IT 3 X WEEKLY       Vitamin E 400 units TABS Take 400 Units by mouth 2 times daily       Coenzyme Q10 (CO Q-10) 200 MG CAPS Take 200 mg by mouth 2 times daily       vitamin C (ASCORBIC ACID) 500 MG tablet Take 500 mg by mouth daily      b complex vitamins capsule Take 1 capsule by mouth daily      Cod Liver Oil 1000 MG CAPS Take by mouth       No current facility-administered medications on file prior to visit. 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    Aortic valve replaced Z95.2      ________________________________________________________  Assessment / Latrice Wallace was seen today for otalgia. Diagnoses and all orders for this visit:    Heart murmur    Aortic valve replaced    Doing well s/p TAVR. Exercise tolerance back to baseline without symptoms. No worrisome findings. Left ear pain    Resolved. Discussed HM topics and she declines. Includes mammo and vaccines.  Feels it is financially wasteful for the possible benefit with her feeling well and that her health is in God's hands. Return in about 6 months (around 3/10/2021). or as scheduled. Patient counseled to follow up sooner or seek more acute care if symptoms worsening or not improving according to plan. Electronically signed by Srikanth Luther MD on 9/10/2020    This note may have been created using dictation software.  Efforts were made to reduce grammatical or syntax errors, but some may persist.

## 2021-03-17 ENCOUNTER — OFFICE VISIT (OUTPATIENT)
Dept: FAMILY MEDICINE CLINIC | Age: 71
End: 2021-03-17

## 2021-03-17 VITALS
WEIGHT: 147.4 LBS | OXYGEN SATURATION: 97 % | BODY MASS INDEX: 23.69 KG/M2 | SYSTOLIC BLOOD PRESSURE: 112 MMHG | RESPIRATION RATE: 18 BRPM | HEART RATE: 102 BPM | DIASTOLIC BLOOD PRESSURE: 78 MMHG | HEIGHT: 66 IN | TEMPERATURE: 96.7 F

## 2021-03-17 DIAGNOSIS — Z95.2 AORTIC VALVE REPLACED: Primary | ICD-10-CM

## 2021-03-17 DIAGNOSIS — R01.1 HEART MURMUR: ICD-10-CM

## 2021-03-17 PROCEDURE — 99213 OFFICE O/P EST LOW 20 MIN: CPT | Performed by: FAMILY MEDICINE

## 2021-03-17 ASSESSMENT — PATIENT HEALTH QUESTIONNAIRE - PHQ9
SUM OF ALL RESPONSES TO PHQ QUESTIONS 1-9: 0
2. FEELING DOWN, DEPRESSED OR HOPELESS: 0
SUM OF ALL RESPONSES TO PHQ QUESTIONS 1-9: 0

## 2021-03-17 NOTE — PROGRESS NOTES
Th[facility] Boast  Office Progress Note - Dr. Ro Chowdhury  3/17/21    CC:   Chief Complaint   Patient presents with    Follow-up     6 mth         HPI: 6-month follow-up on history of aortic valve replacement. She has continued doing well. Denies any concerns today. No new symptoms or problems. Maintains her normal activity levels. No shortness of breath, chest pain, leg swelling, palpitations, orthopnea, exertional fatigue, etc.  Murmur persists from the valve replacement, but no worrisome features. Otherwise she has remained healthy. She declines screening tests that are age-appropriate.  _________________________________________________________    Assessment / Chela Melton was seen today for follow-up. Diagnoses and all orders for this visit:    Aortic valve replaced    Heart murmur    She is doing well. Has not developed any problems after her valve replacement. She does not wish to follow-up frequently with physicians, having a more traditional standpoint of she will call if she needs something. We reviewed worrisome symptoms to watch for that would suggest her valve is starting to give her problems. She will call if she develops any, otherwise she will see me in 1 year. Return in about 1 year (around 3/17/2022). or as scheduled. Patient counseled to follow up sooner or seek more acute care if symptoms worsening or not improving according to plan.      Electronically signed by Alfred Weaver MD on 3/20/2021    _________________________________________________________  Current Outpatient Medications on File Prior to Visit   Medication Sig Dispense Refill    NONFORMULARY NONFORMULARY NONFORMULARY Indications: hawthorn berry, soy lecithin, kelp white oak bark, ilra seal root Indications: hawthorn berry, soy lecithin, kelp white oak bark, lira seal root 0 Active  Historical Provider Historical Provider Raghav 28 ([id number])      magnesium oxide (MAG-OX) 400 MG tablet Take 400 mg by mouth daily      calcium carbonate (OSCAL) 500 MG TABS tablet Take 500 mg by mouth daily Indications: PT TAKES IT 3 X WEEKLY       Vitamin E 400 units TABS Take 400 Units by mouth 2 times daily       Coenzyme Q10 (CO Q-10) 200 MG CAPS Take 200 mg by mouth 2 times daily       vitamin C (ASCORBIC ACID) 500 MG tablet Take 500 mg by mouth daily      b complex vitamins capsule Take 1 capsule by mouth daily      Cod Liver Oil 1000 MG CAPS Take by mouth       No current facility-administered medications on file prior to visit. 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    Aortic valve replaced Z95.2     _________________________________________________________  Past Medical History:   Diagnosis Date    Aortic stenosis, severe     Arthritis     Heart murmur     Heart murmur     Since birth    HFrEF (heart failure with reduced ejection fraction) ([facility] [address] 02/28/2020 2/25/20- echo- LVEF 35-40%, stage II DD, moderately enlarged LA, mild MR, mild TR, critical AS, LVDD: 5.0, RVDD: 2.4    History of blood transfusion     Hypertension     Pleural effusion, bilateral 02/25/2020    Varicosities of leg        Family History   Problem Relation Age of Onset    High Blood Pressure Mother     Heart Disease Father     Heart Disease Brother         cabg    Heart Disease Sister     No Known Problems Brother     Other Sister         Tumor, unsure, possibly uterine . Past Surgical History:   Procedure Laterality Date    AORTIC VALVE REPLACEMENT N/A 3/4/2020    TRANSCATHETER AORTIC VALVE REPLACEMENT FEMORAL APPROACH performed by Nova Braden MD at [address] Right In her 46s. after a fall.     [place] Ross Shah, [place]    THORACENTESIS Right 02/27/2020    1000 cc    THORACENTESIS Left 02/28/2020    425

## 2021-04-13 ENCOUNTER — HOSPITAL ENCOUNTER (OUTPATIENT)
Dept: CARDIOLOGY | Age: 71
Discharge: HOME | End: 2021-04-13

## 2021-04-13 ENCOUNTER — OFFICE VISIT (OUTPATIENT)
Dept: CARDIOLOGY CLINIC | Age: 71
End: 2021-04-13

## 2021-04-13 VITALS
WEIGHT: 146 LBS | TEMPERATURE: 97.7 F | SYSTOLIC BLOOD PRESSURE: 70 MMHG | BODY MASS INDEX: 25.87 KG/M2 | HEIGHT: 63 IN | HEART RATE: 108 BPM | DIASTOLIC BLOOD PRESSURE: 50 MMHG

## 2021-04-13 DIAGNOSIS — Z95.2 S/P TAVR (TRANSCATHETER AORTIC VALVE REPLACEMENT): Primary | ICD-10-CM

## 2021-04-13 LAB
LV EF: 60 %
LVEF MODALITY: NORMAL

## 2021-04-13 PROCEDURE — 93306 TTE W/DOPPLER COMPLETE: CPT

## 2021-04-13 PROCEDURE — 99213 OFFICE O/P EST LOW 20 MIN: CPT | Performed by: PHYSICIAN ASSISTANT

## 2021-04-13 NOTE — PATIENT INSTRUCTIONS
Follow up in structural heart clinic 4/12/2022 at 11:30am for echocardiogram    Follow up with Dr. Gilda Acosta and [place] at their discretion    Reminder: antibiotic required prior to any dental appointments

## 2021-04-20 NOTE — PROGRESS NOTES
Structural Heart Clinic Note      Patient name: Olean Eisenmenger    Reason for visit: TAVR follow up     Referring Physician: Álvaro Mccarthy MD    Primary Care Physician: Ericka Adam MD    Date of service: 4/13/2021    Chief Complaint: TAVR follow up - one year    HPI: Mrs. Butch Sanders presents for follow up s/p TAVR on 3/4/2020. She is doing very well. She denies chest discomfort, SOB/ALVARENGA, orthopnea, PND, LE edema, palpitations, lightheadedness or syncope. She is able to perform all desired activity (including hoeing/raking in her garden) without difficulty. Allergies: No Known Allergies    Home medications:    Current Outpatient Medications   Medication Sig Dispense Refill    NONFORMULARY NONFORMULARY NONFORMULARY Indications: hawthorn berry, soy lecithin, kelp white oak bark, lira seal root Indications: hawthorn berry, soy lecithin, kelp white oak bark, lira seal root 0 Active  Historical Provider Historical Provider Raghav Anna ([id number])      magnesium oxide (MAG-OX) 400 MG tablet Take 400 mg by mouth daily      calcium carbonate (OSCAL) 500 MG TABS tablet Take 500 mg by mouth daily Indications: PT TAKES IT 3 X WEEKLY       Vitamin E 400 units TABS Take 400 Units by mouth 2 times daily       Coenzyme Q10 (CO Q-10) 200 MG CAPS Take 200 mg by mouth 2 times daily       vitamin C (ASCORBIC ACID) 500 MG tablet Take 500 mg by mouth daily      b complex vitamins capsule Take 1 capsule by mouth daily      Cod Liver Oil 1000 MG CAPS Take by mouth       No current facility-administered medications for this visit.         Past Medical History:  Past Medical History:   Diagnosis Date    Aortic stenosis, severe     Arthritis     Heart murmur     Heart murmur     Since birth    HFrEF (heart failure with reduced ejection fraction) ([facility] [address] 02/28/2020 2/25/20- echo- LVEF 35-40%, stage II DD, moderately enlarged LA, mild MR, mild TR, critical AS, LVDD: 5.0, RVDD: 2.4    History of blood transfusion  Hypertension     Pleural effusion, bilateral 02/25/2020    Varicosities of leg        Past Surgical History:  Past Surgical History:   Procedure Laterality Date    AORTIC VALVE REPLACEMENT N/A 3/4/2020    TRANSCATHETER AORTIC VALVE REPLACEMENT FEMORAL APPROACH performed by Stuart Hoffmann MD at [address] [place] [address] Right In her 46s. after a fall.     [place] Drake Do, [place]    THORACENTESIS Right 02/27/2020    1000 cc    THORACENTESIS Left 02/28/2020    425 cc    TRANSESOPHAGEAL ECHOCARDIOGRAM N/A 3/4/2020    GERTRUDIS performed by Stuart Hoffmann MD at [address] [facility] History:  Social History     Socioeconomic History    Marital status:      Spouse name: Not on file    Number of children: Not on file    Years of education: Not on file    Highest education level: Not on file   Occupational History    Not on file   Social Needs    Financial resource strain: Not on file    Food insecurity     Worry: Not on file     Inability: Not on file    Transportation needs     Medical: Not on file     Non-medical: Not on file   Tobacco Use    Smoking status: Never Smoker    Smokeless tobacco: Never Used   Substance and Sexual Activity    Alcohol use: No    Drug use: No    Sexual activity: Not on file   Lifestyle    Physical activity     Days per week: Not on file     Minutes per session: Not on file    Stress: Not on file   Relationships    Social connections     Talks on phone: Not on file     Gets together: Not on file     Attends Jehovah's witness service: Not on file     Active member of club or organization: Not on file     Attends meetings of clubs or organizations: Not on file     Relationship status: Not on file    Intimate partner violence     Fear of current or ex partner: Not on file     Emotionally abused: Not on file     Physically abused: Not on file     Forced sexual activity: Not on file   Other Topics Concern    Not on file   Social History Narrative    Has been a [employer] [facility] [employer]. Lived in [facility]       Family History:  Family History   Problem Relation Age of Onset    High Blood Pressure Mother     Heart Disease Father     Heart Disease Brother         cabg    Heart Disease Sister     No Known Problems Brother     Other Sister         Tumor, unsure, possibly uterine . Review of Systems:  Constitutional: Denies fevers, chills, or weight loss. HEENT: Denies visual changes or hearing loss. Heart: As per HPI. Lungs: Denies shortness of breath, cough, or wheezing. Gastrointestinal: Denies nausea, vomiting, constipation, or diarrhea. Genitourinary: dysuria or hematuria. Psychiatric: Patient denies anxiety or depression. Neurologic: Patient denies weakness of the extremities, dizziness, or headaches. All other ROS checked and found to be negative. Objective:  Vitals BP (!) 70/50 (Site: Left Upper Arm, Position: Sitting, Cuff Size: Medium Adult)   Pulse 108   Temp 97.7 °F (36.5 °C) (Temporal)   Ht 5' 3\" (1.6 m)   Wt 146 lb (66.2 kg)   BMI 25.86 kg/m²   General Appearance: Pleasant 65y.o. year old female who appears stated age. Communicates well, no acute distress. HEENT: Head is normocephalic, atraumatic. EOMs intact, PERRL. Trachea midline. Lungs: Normal respiratory rate and normal effort. She is not in respiratory distress. Breath sounds clear to auscultation. No wheezes. Heart: Normal rate. Regular rhythm. S1 normal and S2 normal. No murmur. Chest: Symmetric chest wall expansion. Extremities: warm, well perfused. Normal range of motion. No edema. Neurological: Patient is alert and oriented to person, place and time. Skin: Warm and dry. Abdomen: Abdomen is soft and non-distended. Bowel sounds are normal. .   Pulses: Distal pulses are intact. Skin: Warm and dry without lesions. Assessment:   1. Critical AS s/p TAVR - doing well. Mean gradient 23 mmHg on echo today - unchanged from previous  2. H/o HTN - SBP \"low\" today.  Very difficult to hear when obtaining BP manually but appears to actually be more in the low 90s by visualization of sphygmomanometer. Patient completely asymptomatic. She is not on antihypertensives. States her BP machine at home usually reads \"error\" when she tries to take it  3. NYHA class I      Plan:   1. Follow up in structural clinic in one year  2. Follow up BP with PCP at his discretion  3. Follow up with Dr. Zackery Flores at his discretion  4.  SBE prophylaxis reviewed      Electronically signed by Pelon Harmon PA-C on 4/20/2021 at 2:15 PM

## 2022-01-28 ENCOUNTER — OFFICE VISIT (OUTPATIENT)
Dept: FAMILY MEDICINE CLINIC | Age: 72
End: 2022-01-28

## 2022-01-28 VITALS
HEART RATE: 104 BPM | BODY MASS INDEX: 24.11 KG/M2 | WEIGHT: 150 LBS | SYSTOLIC BLOOD PRESSURE: 110 MMHG | DIASTOLIC BLOOD PRESSURE: 70 MMHG | HEIGHT: 66 IN | TEMPERATURE: 97.6 F | OXYGEN SATURATION: 99 %

## 2022-01-28 DIAGNOSIS — S42.294A OTHER CLOSED NONDISPLACED FRACTURE OF PROXIMAL END OF RIGHT HUMERUS, INITIAL ENCOUNTER: Primary | ICD-10-CM

## 2022-01-28 PROCEDURE — 99213 OFFICE O/P EST LOW 20 MIN: CPT | Performed by: FAMILY MEDICINE

## 2022-01-28 NOTE — PROGRESS NOTES
[facility]  Office Progress Note - Dr. Francia Oliveira  1/28/22    CC:   Chief Complaint   Patient presents with    Shoulder Injury     Billie Coulter and hurt right hip and right shoulder. Had X-ray done at [facility] on 01/21/22. Billie Coulter on 01/14/22        /70 (Site: Left Upper Arm, Position: Sitting, Cuff Size: Large Adult)   Pulse 104   Temp 97.6 °F (36.4 °C) (Temporal)   Ht 5' 6\" (1.676 m)   Wt 150 lb (68 kg)   SpO2 99%   BMI 24.21 kg/m²   Wt Readings from Last 3 Encounters:   01/28/22 150 lb (68 kg)   04/13/21 146 lb (66.2 kg)   03/17/21 147 lb 6.4 oz (66.9 kg)       HPI: fall 2 weeks ago  [facility]ent to [facility] at our office a week ago. Just presenting today? Right humeral fracture. We reviewed images. Looks like mild impaction of the humeral shaft into the head of the humerus. Managing pain with minimal medications. Has been guarding the arm against movement at the shoulder but no pain at elbow or wrist. ROM normal at those joints. Significantly restricted at right shoulder with AROM. PROM moderately better but diminished.        _________________________________________________________    Assessment / Plan  Lissy Hernandez was seen today for shoulder injury. Diagnoses and all orders for this visit:    Other closed nondisplaced fracture of proximal end of right humerus, initial encounter  -     External Referral To Orthopedic Surgery    pain managed with guarding the shoulder and mild OTC pain meds. Improved over past week. Arm to Sling. Did not have correct size in office. Recommended medium sling from pharmacy to immobilize. May have a good chance of healing with conservative measures, but would ask Ortho to confirm this. Patient not interested in surgery right now. PRN or as scheduled. Patient counseled to follow up sooner or seek more acute care if symptoms worsening or not improving according to plan.      Electronically signed by Tania El MD on 1/28/2022    _________________________________________________________  Current Outpatient Medications on File Prior to Visit   Medication Sig Dispense Refill    NONFORMULARY NONFORMULARY NONFORMULARY Indications: hawthorn berry, soy lecithin, kelp white oak bark, lira seal root Indications: hawthorn berry, soy lecithin, kelp white oak bark, lira seal root 0 Active  Historical Provider Historical Provider Raghav 28 ([id number])      magnesium oxide (MAG-OX) 400 MG tablet Take 400 mg by mouth daily      calcium carbonate (OSCAL) 500 MG TABS tablet Take 500 mg by mouth daily Indications: PT TAKES IT 3 X WEEKLY       Vitamin E 400 units TABS Take 400 Units by mouth 2 times daily       Coenzyme Q10 (CO Q-10) 200 MG CAPS Take 200 mg by mouth 2 times daily       vitamin C (ASCORBIC ACID) 500 MG tablet Take 500 mg by mouth daily      b complex vitamins capsule Take 1 capsule by mouth daily      Cod Liver Oil 1000 MG CAPS Take by mouth       No current facility-administered medications on file prior to visit.        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    Aortic valve replaced Z95.2     _________________________________________________________  Past Medical History:   Diagnosis Date    Aortic stenosis, severe     Arthritis     Heart murmur     Heart murmur     Since birth    HFrEF (heart failure with reduced ejection fraction) ([facility] [address] 02/28/2020 2/25/20- echo- LVEF 35-40%, stage II DD, moderately enlarged LA, mild MR, mild TR, critical AS, LVDD: 5.0, RVDD: 2.4    History of blood transfusion     Hypertension     Pleural effusion, bilateral 02/25/2020    Varicosities of leg        Family History   Problem Relation Age of Onset    High Blood Pressure Mother     Heart Disease Father     Heart Disease Brother         cabg    Heart Disease Sister    Mishel Lu No Known Problems Brother     Other Sister         Tumor, unsure, possibly uterine . Past Surgical History:   Procedure Laterality Date    AORTIC VALVE REPLACEMENT N/A 3/4/2020    TRANSCATHETER AORTIC VALVE REPLACEMENT FEMORAL APPROACH performed by Gary Webb MD at [address] [facility] [address] Right In her 46s. after a fall. Dr. Bill Wallace, [place]    THORACENTESIS Right 02/27/2020    1000 cc    THORACENTESIS Left 02/28/2020    425 cc    TRANSESOPHAGEAL ECHOCARDIOGRAM N/A 3/4/2020    GERTRUDIS performed by Gary Webb MD at [address] [facility] History     Tobacco Use    Smoking status: Never Smoker    Smokeless tobacco: Never Used   Vaping Use    Vaping Use: Never used   Substance Use Topics    Alcohol use: No    Drug use: No       Chart reviewed and updated where appropriate for PMH, Fam, and Soc Hx.  _________________________________________________________  ROS: POSITIVE: As in the HPI. Otherwise Pertinent negatives are negative.    __________________________________________________________  Physical Exam   Constitutional:    She is oriented to person, place, and time. She appears well-developed and well-nourished. Musculoskeletal:    Major decreased AROM at right shoulder in all directions. Better PROM without much pain but still significantly decreased. Elbow and wrist normal.      No joint swelling noted. No peripheral edema. Neurological:    She is A&Ox3    Motor and sensation grossly intact. Normal Gait.  strength normal. Flex and ext wrist normal. Dec strength at shoulder secondary to pain. Skin:    Skin is warm and dry. No rashes, No lesions. bruising along right cheek and temple.   ________________________________________________________    This note may have been created using dictation software.  Efforts were made to reduce errors, but some may persist.

## 2022-01-31 ENCOUNTER — TELEPHONE (OUTPATIENT)
Dept: FAMILY MEDICINE CLINIC | Age: 72
End: 2022-01-31

## 2022-02-01 ENCOUNTER — TELEPHONE (OUTPATIENT)
Dept: FAMILY MEDICINE CLINIC | Age: 72
End: 2022-02-01

## 2022-02-01 DIAGNOSIS — S42.411D CLOSED SUPRACONDYLAR FRACTURE OF RIGHT HUMERUS WITH ROUTINE HEALING, SUBSEQUENT ENCOUNTER: Primary | ICD-10-CM

## 2022-02-01 NOTE — TELEPHONE ENCOUNTER
We have multiple ortho docs. I do not know if they have a particular program to help with payments. [employer] has a self-pay discount. Was she able to get an arm sling and has she been using it? Does she feel like she is using it correctly? If she has been  using and pain is tolerable, I would recommend continued use for the next 2 weeks whenever she is not sleeping, and then a follow up xray to see if bone is starting to heal.       Can still see ortho if she would like, but I think immobilizing the arm in the sling for the next couple weeks is important. Xray will show if she is starting to have healing occur.

## 2022-02-01 NOTE — TELEPHONE ENCOUNTER
Spoke to the patient, she said she is tolerating the pain. She is wearing it correctly. She wants to know if you need to see her in 2 weeks and do a follow up xray?

## 2022-03-03 ENCOUNTER — TELEPHONE (OUTPATIENT)
Dept: FAMILY MEDICINE CLINIC | Age: 72
End: 2022-03-03

## 2022-03-29 ENCOUNTER — TELEPHONE (OUTPATIENT)
Dept: FAMILY MEDICINE CLINIC | Age: 72
End: 2022-03-29

## 2022-03-29 DIAGNOSIS — S42.411D CLOSED SUPRACONDYLAR FRACTURE OF RIGHT HUMERUS WITH ROUTINE HEALING, SUBSEQUENT ENCOUNTER: Primary | ICD-10-CM

## 2022-03-29 NOTE — TELEPHONE ENCOUNTER
I saw patient at her 's appointment earlier today. She had a question about heart valve follow-up. It looks like she actually has an appointment scheduled on April 12. She is to have an echo at 1130, and then is seeing Saurabh Gao physician assistant at [id number]. This looks like it is at the [facility] cardiology office. Perhaps the heart valve clinic. Please advise patient and see if she is aware of these appointments. This is 1 year follow-up from last visit. She also had said she was interested in doing some physical therapy. I think that there was some miscommunication when she first told us that. I apologize for that. I am going to go ahead and order her physical therapy here at the office for her right shoulder. She is welcome to attend for a few appointments and try it out. If she learns enough that she can do at home, she could transition to home since she will be paying out of pocket. Please assure referral to physical therapy is arranged.

## 2022-03-30 NOTE — TELEPHONE ENCOUNTER
Alisa Mindy informed&voiced understanding. She will discuss PT referral with her &call back if she wishes to schedule. She will go to 04/12 appt.

## 2022-04-12 ENCOUNTER — HOSPITAL ENCOUNTER (OUTPATIENT)
Dept: CARDIOLOGY | Age: 72
Discharge: HOME | End: 2022-04-12

## 2022-04-12 ENCOUNTER — OFFICE VISIT (OUTPATIENT)
Dept: CARDIOLOGY CLINIC | Age: 72
End: 2022-04-12

## 2022-04-12 VITALS
HEART RATE: 108 BPM | BODY MASS INDEX: 24.99 KG/M2 | DIASTOLIC BLOOD PRESSURE: 58 MMHG | WEIGHT: 150 LBS | SYSTOLIC BLOOD PRESSURE: 102 MMHG | TEMPERATURE: 97.8 F | HEIGHT: 65 IN

## 2022-04-12 DIAGNOSIS — Z95.2 S/P TAVR (TRANSCATHETER AORTIC VALVE REPLACEMENT): Primary | ICD-10-CM

## 2022-04-12 PROCEDURE — 93308 TTE F-UP OR LMTD: CPT

## 2022-04-12 PROCEDURE — 99213 OFFICE O/P EST LOW 20 MIN: CPT | Performed by: PHYSICIAN ASSISTANT

## 2022-04-12 NOTE — PROGRESS NOTES
[facility]   Phone: [phone]   Fax: [phone]           Date:  2022   Patient: Rigo Barry  :   MRN: [id number]  Referring Provider: Jorden Runner, MD  [address] Diagnosis:   V15.876Z (ICD-10-CM) - Closed supracondylar fracture of right humerus with routine healing, subsequent encounter     SUBJECTIVE:     Onset date: Shelton 15 2022     Mechanism of Injury / History: Pt reports had a fall in January. Pt reports doesn't remember if she saw an orthopedic doctor but did wear a sling for awhile . Reports much improvement but struggling with reaching out to the side and up  . Patient is right handed. Previous PT: none    Medical Management for Current Problem: none    Chief complaint: decreased motion    Behavior: condition is getting better    Pain: Current: 3/10         Aggravated by: reaching overhead, reaching out, reaching behind back    Relieved by: rest      Imaging results: No results found. Past Medical History:  Past Medical History:   Diagnosis Date    Aortic stenosis, severe     Arthritis     Heart murmur     Heart murmur     Since birth    HFrEF (heart failure with reduced ejection fraction) ([facility] [address] 2020- echo- LVEF 35-40%, stage II DD, moderately enlarged LA, mild MR, mild TR, critical AS, LVDD: 5.0, RVDD: 2.4    History of blood transfusion     Hypertension     Pleural effusion, bilateral 2020    Varicosities of leg      Past Surgical History:   Procedure Laterality Date    AORTIC VALVE REPLACEMENT N/A 3/4/2020    TRANSCATHETER AORTIC VALVE REPLACEMENT FEMORAL APPROACH performed by Pantera Luna MD at [address] [place] [address] Right In her 46s. after a fall.     Dr. Jackie Avery, [place]    THORACENTESIS Right 2020    1000 cc    THORACENTESIS Left 2020    425 cc    TRANSESOPHAGEAL ECHOCARDIOGRAM N/A 3/4/2020    GERTRUDIS performed by Chani Davis MD at [address]       Medications:   Current Outpatient Medications   Medication Sig Dispense Refill    NONFORMULARY NONFORMULARY NONFORMULARY Indications: hawthorn berry, soy lecithin, kelp white oak bark, lira seal root Indications: hawthorn berry, soy lecithin, kelp white oak bark, lira seal root 0 Active  Historical Provider Historical Provider Raghav 28 ([id number])      magnesium oxide (MAG-OX) 400 MG tablet Take 400 mg by mouth daily      calcium carbonate (OSCAL) 500 MG TABS tablet Take 500 mg by mouth daily Indications: PT TAKES IT 3 X WEEKLY       Vitamin E 400 units TABS Take 400 Units by mouth 2 times daily       Coenzyme Q10 (CO Q-10) 200 MG CAPS Take 200 mg by mouth 2 times daily       vitamin C (ASCORBIC ACID) 500 MG tablet Take 500 mg by mouth daily      b complex vitamins capsule Take 1 capsule by mouth daily      Cod Liver Oil 1000 MG CAPS Take by mouth       No current facility-administered medications for this visit.          Patient Goals: improve ROM to be able to do things like comb hair without pain    Precautions/Contraindications: none    OBJECTIVE:     Observations: well nourished female                  Palpation: Non-tender to palpation      Joint/Motion:  Right Shoulder:  AROM: 80° Forward elevation,  30° ER,  IR to 60 (ER/IR tested at approx 45* abd ) abd 60   PROM:  90° Forward elevation,  30° ER,  IR to 60 (ER/IR tested at approx 45* abd ) abd 85     Left Shoulder:  AROM: 160° Forward elevation,  70° ER,  IR to 80 abd 180      Strength:  Right Shoulder: grossly 2+/5     Left Shoulder: Flexion 4/5,  Abduction 4/5, ER 4/5, IR 4/5       Special Tests/Functional Screens:  NT d/t known healing fx   [] Jessica []+ / [] -  [] [place]'s []+ / [] -   [] AC Sheer []+ / [] -    [] [facility] drawer []+ / [] -    [] Bicep Load []+ / [] -   [] Crank []+ / [] -  [] [place] Butter []+ / [] -   [] Maretta Lin []+ / [] -  [] Neer's []+ / [] -      [] Speed's []+ / [] -   [] Julio Cesar's []+ / [] -    [] Sulcus Sign []+ / [] -   [] Apprehension []+ / [] -   [] Bicep Load II []+ / [] -   [] Elbow Valgus []+ / [] -     [] Elbow Varus []+ / [] -   [] Lewis's relocation []+ / [] -   [] Empty Can []+ / [] -  [] Drop arm []+ / [] -  [] ER lag []+ / [] -  [] Painful Arc []+ / [] -  [] Sue Sermon []+ / [] -  [] Belly Press/ lift off[]+ / [] -  [] Other: []+ / [] -         ASSESSMENT     Outcome Measure:   QuickDASH (Disorders of the Arm, Shoulder, and Hand) 36.36% disability    Problems:    Pain reported 3/10   ROM decreased   Strength decreased   Decreased functional ability with ADLs , use of right upper extremity, reaching, carrying    Reason for Skilled Care: Pt presents to therapy due to fall in January with right humerus fracture. Pt now with impaired ROM and strength. Pt will benefit from PT to improve ROM and strength for daily activities. Pt reports only planned to come to initial evaluation to learn exercises to do at home. Educated pt on benefits of therapy and recommendation for further visits. Pt reports will discuss with family and let therapy know. [x] There are no barriers affecting plan of care or recovery    [] Barriers to this patient's plan of care or recovery include. Domestic Concerns:  [x] No  [] Yes:        Long Term goals (4-6 weeks)    Decrease reported pain to 0-2/10   Increase ROM to AROM: 130° Forward elevation,  60° ER,  IR to 80, abd 110, PROM:  135° Forward elevation,  65° ER,  IR to 85 , abd 120   Increase Strength to 3+ to 4-/5     Able to perform/complete the following functions/tasks: pt able to reach above shoulder height 10x with no to min pain/limitation. Pt able to reach behind back to L spine with no to min pain /limitation. Pt able to bathe and dress including washing and styling hair with no to min pain/limitation.      QuickDASH 25% disability   Independent with Home Exercise Programs    Rehab Potential: [x] Good  [] Fair  [] Poor    PLAN       Treatment Plan:   [x] Therapeutic Exercise  [x] Therapeutic Activity  [x] Neuromuscular Re-education   [] Gait Training  [] Balance Training  [x] Aerobic conditioning  [x] Manual Therapy  [x] Massage/Fascial release   [] Work/Sport specific activities    [] Pain Neuroscience [x] Cold/hotpack  [] Vasocompression  [x] Electrical Stimulation  [] Lumbar/Cervical Traction  [x] Ultrasound   [] Iontophoresis: 4 mg/mL Dexamethasone Sodium Phosphate 40-80 mAmin        [x] Instruction in HEP      []  Medication allergies reviewed for use of Dexamethasone Sodium Phosphate 4mg/ml  with iontophoresis treatments. Patient is not allergic. The following CPT codes are likely to be used in the care of this patient: 455 1011 PT Evaluation: Low Complexity   17541 PT Re-Evaluation   1915 Drivable Neuromuscular Re-Education   68035 Therapeutic Activities   06443 Manual Therapy    Electrical Stimulation  86435  US      Suggested Professional Referral: [x] No  [] Yes:     Patient Education:  [x] Plans/Goals, Risks/Benefits discussed  [x] Home exercise program  Method of Education: [x] Verbal  [x] Demo  [x] Written  Comprehension of Education:  [x] Verbalizes understanding. [x] Demonstrates understanding. [] Needs Review. [] Demonstrates/verbalizes understanding of HEP/Ed previously given. Frequency:  2 days per week for 4-6 weeks    Patient understands diagnosis/prognosis and consents to treatment, plan and goals: [x] Yes    [] No     Thank you for the opportunity to work with your patient. If you have questions or comments, please contact me at numbers listed above. Electronically signed by: Jorge Quintero, PT PT , DPT [id number]    Medicare Patients Only     Please sign Physician's Certification and return to:   [address]  [facility]  [address] [id number]  Dept: [phone]  Dept Fax: [phone]: [phone]    Physician's Certification / Comments     Frequency/Duration 2 days per week for 4-6 weeks. Certification period from 4/13/2022  to 5/27/2022. I have reviewed the Plan of Care established for skilled therapy services and certify that the services are required and that they will be provided while the patient is under my care.     Physician's Comments/Revisions:               Physician's Printed Name:                                           [de-identified] Signature:                                                               Date:

## 2022-04-12 NOTE — PROGRESS NOTES
Structural Heart Clinic Note      Patient name: Silverlake Due    Reason for visit: TAVR follow up     Referring Physician: Vidya Spann MD    Primary Care Physician: Sreedhar Guzman MD    Date of service: 4/13/2021    Chief Complaint: TAVR follow up - two year    HPI: Mrs. Cata Boland presents for follow up s/p TAVR on 3/4/2020. She continues to do well. Since last visit, she fell at home and sustained a shoulder fracture. She denies chest discomfort, SOB/ALVARENGA, orthopnea, PND, LE edema, palpitations, lightheadedness or syncope. She is able to perform all desired activity (including hoeing/raking in her garden) without difficulty. Allergies: No Known Allergies    Home medications:    Current Outpatient Medications   Medication Sig Dispense Refill    NONFORMULARY NONFORMULARY NONFORMULARY Indications: hawthorn berry, soy lecithin, kelp white oak bark, lira seal root Indications: hawthorn berry, soy lecithin, kelp white oak bark, lira seal root 0 Active  Historical Provider Historical Provider Raghav Anna ([id number])      magnesium oxide (MAG-OX) 400 MG tablet Take 400 mg by mouth daily      calcium carbonate (OSCAL) 500 MG TABS tablet Take 500 mg by mouth daily Indications: PT TAKES IT 3 X WEEKLY       Vitamin E 400 units TABS Take 400 Units by mouth 2 times daily       Coenzyme Q10 (CO Q-10) 200 MG CAPS Take 200 mg by mouth 2 times daily       vitamin C (ASCORBIC ACID) 500 MG tablet Take 500 mg by mouth daily      b complex vitamins capsule Take 1 capsule by mouth daily      Cod Liver Oil 1000 MG CAPS Take by mouth       No current facility-administered medications for this visit.      Facility-Administered Medications Ordered in Other Visits   Medication Dose Route Frequency Provider Last Rate Last Admin    perflutren lipid microspheres (DEFINITY) injection 1.65 mg  1.5 mL IntraVENous ONCE PRN GLORIA Ashley           Past Medical History:  Past Medical History:   Diagnosis Date    Aortic stenosis, severe     Arthritis     Heart murmur     Heart murmur     Since birth    HFrEF (heart failure with reduced ejection fraction) ([facility] [address] 02/28/2020 2/25/20- echo- LVEF 35-40%, stage II DD, moderately enlarged LA, mild MR, mild TR, critical AS, LVDD: 5.0, RVDD: 2.4    History of blood transfusion     Hypertension     Pleural effusion, bilateral 02/25/2020    Varicosities of leg        Past Surgical History:  Past Surgical History:   Procedure Laterality Date    AORTIC VALVE REPLACEMENT N/A 3/4/2020    TRANSCATHETER AORTIC VALVE REPLACEMENT FEMORAL APPROACH performed by Parag Arellano MD at [address] [facility] [address] Right In her 46s. after a fall. Dr. Edgar Warren, [place]    THORACENTESIS Right 02/27/2020    1000 cc    THORACENTESIS Left 02/28/2020    425 cc    TRANSESOPHAGEAL ECHOCARDIOGRAM N/A 3/4/2020    GERTRUDIS performed by Parag Arellano MD at [facility] History:  Social History     Socioeconomic History    Marital status:      Spouse name: Not on file    Number of children: Not on file    Years of education: Not on file    Highest education level: Not on file   Occupational History    Not on file   Tobacco Use    Smoking status: Never Smoker    Smokeless tobacco: Never Used   Vaping Use    Vaping Use: Never used   Substance and Sexual Activity    Alcohol use: No    Drug use: No    Sexual activity: Not on file   Other Topics Concern    Not on file   Social History Narrative    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[place] [address] Strain:     Difficulty of Paying Living Expenses: Not on file   Food Insecurity:     Worried About [facility] in the Last Year: Not on file    Ji of Food in the Last Year: Not on file   Transportation Needs:     Lack of Transportation (Medical): Not on file    Lack of Transportation (Non-Medical):  Not on file   Physical Activity:     Days of Exercise per Week: Not on file    Minutes of Exercise per Session: Not on file   Stress:     Feeling of Stress : Not on file   Social Connections:     Frequency of Communication with Friends and Family: Not on file    Frequency of Social Gatherings with Friends and Family: Not on file    Attends Taoism Services: Not on file    Active Member of [address] [place] [address] [employer] or Organizations: Not on file    Attends Club or Organization Meetings: Not on file    Marital Status: Not on file   Intimate Partner Violence:     Fear of Current or Ex-Partner: Not on file    Emotionally Abused: Not on file    Physically Abused: Not on file    Sexually Abused: Not on file   Housing Stability:     Unable to Pay for Housing in the Last Year: Not on file    Number of [place] in the Last Year: Not on file    Unstable Housing in the Last Year: Not on file       Family History:  Family History   Problem Relation Age of Onset    High Blood Pressure Mother     Heart Disease Father     Heart Disease Brother         cabg    Heart Disease Sister     No Known Problems Brother     Other Sister         Tumor, unsure, possibly uterine . Review of Systems:  Constitutional: Denies fevers, chills, or weight loss. HEENT: Denies visual changes or hearing loss. Heart: As per HPI. Lungs: Denies shortness of breath, cough, or wheezing. Gastrointestinal: Denies nausea, vomiting, constipation, or diarrhea. Genitourinary: dysuria or hematuria. Psychiatric: Patient denies anxiety or depression. Neurologic: Patient denies weakness of the extremities, dizziness, or headaches. All other ROS checked and found to be negative. Objective:  Vitals BP (!) 102/58 (Site: Left Upper Arm, Position: Sitting, Cuff Size: Medium Adult)   Pulse 108   Temp 97.8 °F (36.6 °C) (Oral)   Ht 5' 5\" (1.651 m)   Wt 150 lb (68 kg)   BMI 24.96 kg/m²   General Appearance: Pleasant 66y.o. year old female who appears stated age. Communicates well, no acute distress. HEENT: Head is normocephalic, atraumatic. EOMs intact, PERRL. Trachea midline. Lungs: Normal respiratory rate and normal effort. She is not in respiratory distress. Breath sounds clear to auscultation. No wheezes. Heart: Normal rate. Regular rhythm. S1 normal and S2 normal. No murmur. Chest: Symmetric chest wall expansion. Extremities: warm, well perfused. Normal range of motion. No edema. Neurological: Patient is alert and oriented to person, place and time. Skin: Warm and dry. Abdomen: Abdomen is soft and non-distended. Bowel sounds are normal. .   Pulses: Distal pulses are intact. Skin: Warm and dry without lesions. Assessment:   1. Critical AS s/p TAVR - doing well. A. Mean gradient 21 mmHg at 30 days; no PVL   B. Mean gradient 23 mmHg at one year; no PVL   C. Mean gradient 18 mmHg at two year; full report pending  2. H/o HTN - remains well controlled without medication   3. NYHA class I      Plan:   1. Follow up in structural clinic PRN  2. Follow up with Felisha Arboleda and Jose as scheduled  3.  SBE prophylaxis reviewed      Electronically signed by Leslee Rios PA-C on 4/12/2022 at 12:39 PM

## 2022-04-12 NOTE — PATIENT INSTRUCTIONS
Follow up in structural heart clinic as needed    Follow up with Felisha Arboleda and Jose as scheduled    Reminder: antibiotic required prior to dental appointments

## 2022-04-13 ENCOUNTER — EVALUATION (OUTPATIENT)
Dept: PHYSICAL THERAPY | Age: 72
End: 2022-04-13

## 2022-04-13 DIAGNOSIS — S42.411D CLOSED SUPRACONDYLAR FRACTURE OF RIGHT HUMERUS WITH ROUTINE HEALING, SUBSEQUENT ENCOUNTER: Primary | ICD-10-CM

## 2022-04-13 PROCEDURE — 97161 PT EVAL LOW COMPLEX 20 MIN: CPT | Performed by: PHYSICAL THERAPIST

## 2022-04-13 PROCEDURE — 97110 THERAPEUTIC EXERCISES: CPT | Performed by: PHYSICAL THERAPIST

## 2022-04-13 NOTE — PROGRESS NOTES
[address] [facility]   Phone: [phone]   Fax: [phone]      Physical Therapy Daily Treatment Note    Date: 2022  Patient Name: Lizzy Meyers  :    MRN: [id number]  DOInjury: 2022  DOSx: NA   Referring Provider: Ilya Neumann MD  C/ Jarrod Simpson [address] Diagnosis:   X56.229Z (ICD-10-CM) - Closed supracondylar fracture of right humerus with routine healing, subsequent encounter    Outcome Measure:  Renea Girt 36.36%    S: See eval.   O: Pt given written HEP  Time 0920- 1000     Visit  Repeat outcome measure at mid point and end. Pain 3/10     ROM Joint/Motion:  Right Shoulder:  AROM: 80° Forward elevation,  30° ER,  IR to 60 (ER/IR tested at approx 45* abd ) abd 60   PROM:  90° Forward elevation,  30° ER,  IR to 60 (ER/IR tested at approx 45* abd ) abd 85      Modalities            Manual                  Stretch      Table slides 10 x 10s holds  Flexion, scaption ; HEP gentle te   Wall Flexion      Wall ER stretch      Towel IR stretch      IR reaching behind back      Exercise      Shrugs AROM      Pendulum Ex      UBE      Pulleys - flex      Pulleys-IR      Supine wand chest press      Supine wand flex      Supine wand ER/IR      Supine flexion      S-lying ABD      S-lying ER      Standing wand flex      Standing flexion      Standing ABD            ROWS: H Functional activities  To aid in ROM and strength needed for reaching , lifting ,pushing and pulling at home/work    ROWS: M  \"    ROWS: L  \"    ER  \"    IR  \"                A:  Tolerated well. Above added to written HEP.      P: Continue with rehab plan  Mack Arcos, PT DPT, PT [id number]    Treatment Charges: Mins Units   Initial Evaluation 30 1   Re-Evaluation     Ther Exercise         TE 10 1   Manual Therapy     MT     Ther Activities        TA     Gait Training          GT     Neuro Re-education NR     Modalities     Non-Billable Service Time

## 2022-04-27 ENCOUNTER — TREATMENT (OUTPATIENT)
Dept: PHYSICAL THERAPY | Age: 72
End: 2022-04-27

## 2022-04-27 DIAGNOSIS — S42.411D CLOSED SUPRACONDYLAR FRACTURE OF RIGHT HUMERUS WITH ROUTINE HEALING, SUBSEQUENT ENCOUNTER: Primary | ICD-10-CM

## 2022-04-27 PROCEDURE — 97110 THERAPEUTIC EXERCISES: CPT

## 2022-04-27 NOTE — PROGRESS NOTES
[facility] [facility]   Phone: [phone]   Fax: [phone]      Physical Therapy Daily Treatment Note    Date: 2022  Patient Name: Marcelo Aquino  :    MRN: [id number]  DOInjury: 2022  DOSx: NA   Referring Provider: No referring provider defined for this encounter. Medical Diagnosis:   S42.411D (ICD-10-CM) - Closed supracondylar fracture of right humerus with routine healing, subsequent encounter    Outcome Measure:  Evans Mace 36.36%    S:Pt reports \"a nagging pain. \" Most discomfort/pain with overhead movements, hanging laundry. O: Pt given written HEP  Time [id number]     Visit  Repeat outcome measure at mid point and end. Pain 3/10     ROM Joint/Motion:  Right Shoulder:  AROM: 80° Forward elevation,  30° ER,  IR to 60 (ER/IR tested at approx 45* abd ) abd 60   PROM:  90° Forward elevation,  30° ER,  IR to 60 (ER/IR tested at approx 45* abd ) abd 85      Modalities            Manual      PROM 5 min           Stretch      Table slides 10 x 10s holds  Flexion, scaption ; HEP gentle te   Wall Flexion X 10     Wall ER stretch      Towel IR stretch      IR reaching behind back      Exercise      Shrugs AROM      Pendulum Ex      UBE      Pulleys - flex 10 x 5 sec hold     Pulleys-IR      Supine wand chest press 2 x 10 HEP    Supine wand flex 2 x 10 HEP    Supine wand ER/IR 10 x 5s hold  HEP    Supine flexion      Wall flexion 10 x 10s hold     S-lying ABD 2 x 10 Chicken wing     S-lying ER 2 x 10     Standing wand flex      Standing flexion      Standing ABD            ROWS: H Functional activities  To aid in ROM and strength needed for reaching , lifting ,pushing and pulling at home/work    ROWS: M  \"    ROWS: L  \"    ER  \"    IR  \"                A:  Tolerated well. Pt reports shoulder fatigue following treatment. Pt with unable to relax fully for PROM.     P: Continue with rehab plan  Craig De La Cruz, PTA 26480    Treatment Charges: Mins Units   Initial Evaluation     Re-Evaluation     Ther Exercise         TE 29 2   Manual Therapy     MT 5    Ther Activities        TA     Gait Training          GT     Neuro Re-education NR     Modalities     Non-Billable Service Time     Other     Total Time/Units 34 2

## 2022-05-09 NOTE — PROGRESS NOTES
[facility]   Phone: [phone]   Fax: [phone]        Referring Provider: Jerome Elder MD  [address] [facility],  [address] [place] [address] Diagnosis:      Diagnosis Orders   1. Closed supracondylar fracture of right humerus with routine healing, subsequent encounter         CERTIFICATION PERIOD: 4/13/2022  to 5/27/2022. ATTENDANCE:  Patient has attended evaluation and 1 treatment session only. TREATMENTS RECEIVED:  Manual therapy, therapeutic exercise       COMMENTS AND RECOMMENDATIONS:   Pt choosing to self discharge stating she will do the exercises at home. Recommend pt continue with HEP at home and call with any questions. Will discharge pt at this time per pt. Thank you for the opportunity to work with your patient. Mirna Jose, PT DPT [id number]    I CERTIFY THAT THE ABOVE REASSESSMENT AND PLAN OF CARE FOR PHYSICAL THERAPY SERVICES ARE APPROPRIATE AND MEDICALLY NECESSARY.       ________________________                _______________  Physician     Date

## 2022-05-11 ENCOUNTER — TREATMENT (OUTPATIENT)
Dept: PHYSICAL THERAPY | Age: 72
End: 2022-05-11

## 2022-05-11 DIAGNOSIS — S42.411D CLOSED SUPRACONDYLAR FRACTURE OF RIGHT HUMERUS WITH ROUTINE HEALING, SUBSEQUENT ENCOUNTER: Primary | ICD-10-CM

## 2022-07-14 ENCOUNTER — OFFICE VISIT (OUTPATIENT)
Dept: FAMILY MEDICINE CLINIC | Age: 72
End: 2022-07-14

## 2022-07-14 ENCOUNTER — OFFICE VISIT (OUTPATIENT)
Dept: ORTHOPEDIC SURGERY | Age: 72
End: 2022-07-14

## 2022-07-14 VITALS
TEMPERATURE: 98 F | HEIGHT: 65 IN | DIASTOLIC BLOOD PRESSURE: 60 MMHG | SYSTOLIC BLOOD PRESSURE: 90 MMHG | WEIGHT: 150 LBS | OXYGEN SATURATION: 97 % | BODY MASS INDEX: 24.99 KG/M2 | RESPIRATION RATE: 16 BRPM | HEART RATE: 105 BPM

## 2022-07-14 VITALS — BODY MASS INDEX: 24.99 KG/M2 | WEIGHT: 150 LBS | HEIGHT: 65 IN

## 2022-07-14 DIAGNOSIS — M25.331 SCAPHOLUNATE DISSOCIATION OF RIGHT WRIST: ICD-10-CM

## 2022-07-14 DIAGNOSIS — S52.614A CLOSED NONDISPLACED FRACTURE OF STYLOID PROCESS OF RIGHT ULNA, INITIAL ENCOUNTER: ICD-10-CM

## 2022-07-14 DIAGNOSIS — S52.501A CLOSED TRAUMATIC NONDISPLACED FRACTURE OF DISTAL END OF RIGHT RADIUS, INITIAL ENCOUNTER: Primary | ICD-10-CM

## 2022-07-14 DIAGNOSIS — S52.615A CLOSED NONDISPLACED FRACTURE OF STYLOID PROCESS OF LEFT ULNA, INITIAL ENCOUNTER: ICD-10-CM

## 2022-07-14 DIAGNOSIS — S52.501A CLOSED FRACTURE OF DISTAL END OF RIGHT RADIUS, UNSPECIFIED FRACTURE MORPHOLOGY, INITIAL ENCOUNTER: ICD-10-CM
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
## 2022-07-14 PROCEDURE — 99213 OFFICE O/P EST LOW 20 MIN: CPT | Performed by: PHYSICIAN ASSISTANT

## 2022-07-14 PROCEDURE — 1123F ACP DISCUSS/DSCN MKR DOCD: CPT | Performed by: NURSE PRACTITIONER

## 2022-07-14 PROCEDURE — 1123F ACP DISCUSS/DSCN MKR DOCD: CPT | Performed by: PHYSICIAN ASSISTANT

## 2022-07-14 PROCEDURE — 29125 APPL SHORT ARM SPLINT STATIC: CPT | Performed by: PHYSICIAN ASSISTANT

## 2022-07-14 PROCEDURE — 99213 OFFICE O/P EST LOW 20 MIN: CPT | Performed by: NURSE PRACTITIONER

## 2022-07-14 RX ORDER — IBUPROFEN 800 MG/1
800 TABLET ORAL EVERY 8 HOURS PRN
Qty: 20 TABLET | Refills: 0 | Status: SHIPPED | OUTPATIENT
Start: 2022-07-14 | End: 2022-07-21

## 2022-07-14 NOTE — PROGRESS NOTES
[facility] In Care  New Patient Note      CHIEF COMPLAINT:   Chief Complaint   Patient presents with    Wrist Pain     Pt presents this PM with c/o R wrist pain related to a fracture. States that she fell at home yeaterday evening. Visited urgent care, and was referred to ortho. HISTORY OF PRESENT ILLNESS:                The patient is a 66 y.o. female who presents today with plaints of right wrist pain that began after she slipped and fell at home last evening landing on the right side. She is unable to localize the pain and complains of global right wrist pain. She denies any numbness, tingling, loss of sensation or function that radiates into the fingertips. Pain is worse with any type of movement. She has never injured this wrist in the past.       Past Medical History:        Diagnosis Date    Aortic stenosis, severe     Arthritis     Heart murmur     Heart murmur     Since birth    HFrEF (heart failure with reduced ejection fraction) ([facility] [address] 02/28/2020 2/25/20- echo- LVEF 35-40%, stage II DD, moderately enlarged LA, mild MR, mild TR, critical AS, LVDD: 5.0, RVDD: 2.4    History of blood transfusion     Hypertension     Pleural effusion, bilateral 02/25/2020    Varicosities of leg      Past Surgical History:        Procedure Laterality Date    AORTIC VALVE REPLACEMENT N/A 3/4/2020    TRANSCATHETER AORTIC VALVE REPLACEMENT FEMORAL APPROACH performed by Javi Proctor MD at [address] Right In her 46s. after a fall. Dr. Felicitas Ashby, [place]    THORACENTESIS Right 02/27/2020    1000 cc    THORACENTESIS Left 02/28/2020    425 cc    TRANSESOPHAGEAL ECHOCARDIOGRAM N/A 3/4/2020    GERTRUDIS performed by Javi Proctor MD at [address]     Current Medications:   No current facility-administered medications for this visit. Allergies:  Patient has no known allergies. Social History:   TOBACCO:   reports that she has never smoked.  She has never used smokeless tobacco.  ETOH:   reports no history of alcohol use. DRUGS:   reports no history of drug use. OCCUPATION:  Retired    Review of Systems   Constitutional: Negative for fever, chills, diaphoresis, appetite change and fatigue. HENT: Negative for dental issues, hearing loss and tinnitus. Negative for congestion, sinus pressure, sneezing, sore throat. Negative for headache. Eyes: Negative for visual disturbance, blurred and double vision. Negative for pain, discharge, redness and itching  Respiratory: Negative for cough, shortness of breath and wheezing. Cardiovascular: Negative for chest pain, palpitations and leg swelling. No dyspnea on exertion   Gastrointestinal:   Negative for nausea, vomiting, abdominal pain, diarrhea, constipation  or black or bloody. Hematologic\Lymphatic:  negative for bleeding, petechiae,   Genitourinary: Negative for hematuria and difficulty urinating. Musculoskeletal: Negative for neck pain and stiffness. Negative for back pain, joint swelling and gait problem. +right wrist pain  Skin: Negative for pallor, rash and wound. Neurological: Negative for dizziness, tremors, seizures, weakness, light-headedness, no TIA or stroke symptoms. No numbness and headaches. Psychiatric/Behavioral: Negative.      Physical Examination:   General appearance: alert, well appearing, and in no distress,  normal appearing weight  Mental status: alert, oriented to person, place, and time, normal mood, behavior, speech, dress, motor activity, and thought processes  Resp:   resp easy and unlabored, no audible wheezes note  Cardiac: distal pulses palpable, skin well perfused  Neurological: alert, oriented X3, normal speech, no focal findings or movement disorder noted, motor and sensory grossly normal bilaterally, normal muscle tone  HEENT: normochephalic atraumatic, external ears and eyes normal, sclera normal, neck supple  Extremities:   peripheral pulses normal, no edema, redness or tenderness in the calves   Skin: normal coloration, no rashes or open wounds, no suspicious skin lesions noted  Psych: Affect euthymic   Musculoskeletal:   Wrist/Hand:  On visual inspection there is obvious deformity of the right wrist .  There is edema and ecchymosis . There is no decreased sensation to light touch throughout the right wrist or hand. Patient is grossly neurovascularly intact. Right wrist/hand: Patient is tender to palpation globally throughout the right wrist and hand. Patient has significantly limited range of motion secondary to pain. Muscle testing was deferred. Strong radial pulse noted. Ht 5' 5\" (1.651 m)   Wt 150 lb (68 kg)   BMI 24.96 kg/m²      XR: Patient had previous x-rays through [facility] earlier today which were independently reviewed in the clinic and demonstrate a right ulnar styloid process fracture as well as a impacted comminuted distal radius fracture with associated scapholunate disassociation. The imaging will be reviewed and interpreted by Radiologist.  The report was not complete at the time of this note. Please refer to Radiologist report for their interpretation. ASSESSMENT:   Diagnosis Orders   1. Closed traumatic nondisplaced fracture of distal end of right radius, initial encounter  ibuprofen (ADVIL;MOTRIN) 800 MG tablet    Ambulatory referral to Orthopedic Surgery   2. Scapholunate dissociation of right wrist  ibuprofen (ADVIL;MOTRIN) 800 MG tablet    Ambulatory referral to Orthopedic Surgery   3. Closed nondisplaced fracture of styloid process of left ulna, initial encounter         PLAN:  Is a pleasant 68-year-old female who presents to the clinic today for further evaluation and definitive treatment options for a right distal radius fracture as well as a nondisplaced ulnar styloid fracture and scapholunate disassociation.   Exam she is tender globally throughout the right wrist and hand and has significant limited range of motion secondary to pain. She did have previous x-rays through [facility] earlier today which were independently reviewed in clinic. At this time I recommended sugar-tong splint to the right forearm. We also again refer her over to Dr. Varsha Atkinson for opinion regarding surgical intervention. I did prescribe ibuprofen 800 mg with directions to take 1 tablet by mouth every 8 hours as needed for pain with GI precautions. If she has any GI upset, nausea, vomiting, change in appetite, blood in stools she will discontinue medication immediately and contact her office. Patient voiced understanding and agrees with treatment plan outlined for the office today. I am referring her over to Dr. Varsha Atkinson for definitive treatment. I will see her back on an as-needed basis. Electronically signed by Cece Schwartz PA-C on 7/14/22 at 2:43 PM EDT    **This report was transcribed using voice recognition software. Every effort was made to ensure accuracy; however, inadvertent computerized transcription errors may be present. **

## 2022-07-14 NOTE — PATIENT INSTRUCTIONS
At this time I have recommended starting an anti-inflammatory, IBUPROFEN 800MG 1 TAB EVERY 8 HOURS AS NEEDED, with GI precautions. If patient would develop any GI upset, nausea, vomiting, change in appetite, blood in stools he should discontinue the medication and contact our office immediately. They are also aware that he should not take any other over-the-counter anti-inflammatories while on this. They can use Tylenol 500 mg 2 tablets every 8 hours as needed for pain in addition to the prescription anti-inflammatory provided with the visit today. Patient Education        Wearing a Fiberglass Cast: Care Instructions  Your Care Instructions     A cast protects a broken bone or other injury while it heals. Most casts are made of fiberglass. After a cast is put on, you can't remove it yourself. Coreen Dyers or a technician will take it off. Follow-up care is a key part of your treatment and safety. Be sure to make and go to all appointments, and call your doctor if you are having problems. It's also a good idea to know your test results and keep alist of the medicines you take. How can you care for yourself at home? General care   Follow your doctor's instructions for when you can start using the limb that has the cast. Fiberglass casts dry quickly and are soon hard enough to protect the injured arm or leg.  When it's okay to put weight on your leg or foot cast, don't stand or walk on it unless it's designed for walking.  Prop up the injured arm or leg on a pillow anytime you sit or lie down during the first 3 days. Try to keep it above the level of your heart. This will help reduce swelling.  Put ice or a cold pack on your cast for 10 to 20 minutes at a time. Try to do this every 1 to 2 hours for the next 3 days (when you are awake). Put a thin cloth between the ice and your cast. Keep the cast dry.  Be safe with medicines. Read and follow all instructions on the label.   ? If the doctor gave you a prescription medicine for pain, take it as prescribed. ? If you are not taking a prescription pain medicine, ask your doctor if you can take an over-the-counter medicine.  Do exercises as instructed by your doctor or physical therapist. These exercises will help keep your muscles strong and your joints flexible while you heal.   Wiggle your fingers or toes on the injured arm or leg often. This helps reduce swelling and stiffness. Water and your cast   Try to keep your cast as dry as you can. The fiberglass part of your cast can get wet. But getting the inside wet can cause problems.  Use a bag or tape a sheet of plastic to cover your cast when you take a shower or bath or when you have any other contact with water. (Don't take a bath unless you can keep the cast out of the water.) Moisture can collect under the cast and cause skin irritation and itching. It can make infection more likely if you have had surgery or have a wound under the cast.   If you have a water-resistant cast, ask your doctor how often it can get wet and how to take care of it. Cast and skin care   Try blowing cool air from a hair dryer or fan into the cast to help relieve itching. Never stick items under your cast to scratch the skin.  Don't use oils or lotions near your cast. If the skin gets red or irritated around the edge of the cast, you may pad the edges with a soft material or use tape to cover them. When should you call for help? Call your doctor now or seek immediate medical care if:     You have increased or severe pain.      You feel a warm or painful spot under the cast.      You have problems with your cast. For example:  ? The skin under the cast burns or stings. ? The cast feels too tight or too loose. ? There is a lot of swelling near the cast. (Some swelling is normal.)  ? You have a new fever. ? There is drainage or a bad smell coming from the cast.      Your foot or hand is cool or pale or changes color.    You have trouble moving your fingers or toes.      You have symptoms of a blood clot in your arm or leg (called a deep vein thrombosis). These may include:  ? Pain in the arm, calf, back of the knee, thigh, or groin. ? Redness and swelling in the arm, leg, or groin. Watch closely for changes in your health, and be sure to contact your doctor if:     The cast is breaking apart.      You are not getting better as expected. Where can you learn more? Go to [email or web address] org and sign in to your [employer] account. Enter [id number] in the [employer] box to learn more about \"Wearing a Fiberglass Cast: Care Instructions. \"     If you do not have an account, please click on the \"Sign Up Now\" link. Current as of: March 9, 2022               Content Version: 13.3  © 2006-2022 "[employer]". Care instructions adapted under license by [facility] ([facility]). If you have questions about a medical condition or this instruction, always ask your healthcare professional. [place] [address] any warranty or liability for your use of this information. Patient Education        Broken Wrist: Care Instructions  Your Care Instructions     Your wrist can break, or fracture, during sports, a fall, or other accidents. The break may happen when your wrist is hit or is used to protect you in a fall. Fractures can range from a small, hairline crack, to a bone or bonesbroken into two or more pieces. Your treatment depends on how bad the break is. Your doctor may have put your wrist in a cast or splint. This will help keep your wrist stable until your follow-up appointment. It may take weeks or monthsfor your wrist to heal. You can help it heal with care at home. You heal best when you take good care of yourself. Eat a variety of healthyfoods, and don't smoke. Follow-up care is a key part of your treatment and safety.  Be sure to make and go to all appointments, and call your doctor if you are having problems. It's also a good idea to know your test results and keep alist of the medicines you take. How can you care for yourself at home?  Put ice or a cold pack on your wrist for 10 to 20 minutes at a time. Try to do this every 1 to 2 hours for the next 3 days (when you are awake). Put a thin cloth between the ice and your cast or splint. Keep your cast or splint dry.  Follow the splint or cast care instructions your doctor gives you. If you have a splint, do not take it off unless your doctor tells you to. Be careful not to put the splint on too tight.  Be safe with medicines. Take pain medicines exactly as directed. ? If the doctor gave you a prescription medicine for pain, take it as prescribed. ? If you are not taking a prescription pain medicine, ask your doctor if you can take an over-the-counter medicine.  Prop up your wrist on pillows when you sit or lie down in the first few days after the injury. Keep your wrist higher than the level of your heart. This will help reduce swelling.  Move your fingers often to reduce swelling and stiffness, but do not use that hand to grab or carry anything.  Follow instructions for exercises to keep your arm strong. When should you call for help? Call your doctor now or seek immediate medical care if:     You have new or worse pain.      Your hand or fingers are cool or pale or change color.      Your cast or splint feels too tight.      You have tingling, weakness, or numbness in your hand or fingers. Watch closely for changes in your health, and be sure to contact your doctor if:     You do not get better as expected.      You have problems with your cast or splint. Where can you learn more? Go to https://[employer]. org and sign in to your [employer] account. Enter [id number] in the [employer] box to learn more about \"Broken Wrist: Care Instructions. \"     If you do not have an account, please click on the \"Sign Up Now\" link. Current as of: March 9, 2022               Content Version: 13.3  © 2006-2022 [employer]. Care instructions adapted under license by [facility] ([facility]). If you have questions about a medical condition or this instruction, always ask your healthcare professional. [place] [address] any warranty or liability for your use of this information.

## 2022-07-14 NOTE — PROGRESS NOTES
Chief Complaint:   Wrist Pain (fell last night )    History of Present Illness   Source of history provided by:  patient. Kelsey Ramirez is a 66 y.o. old female who presents to the [facility] care complaining of Right wrist pain for the past 1 day. Pt states there was a traumatic incident in which she slipped on water, falling on her right side. Pt states there is associated bruising and swelling to the area. Denies any paresthesias, weakness in the extremity, abrasions, active bleeding, recent illness, or any other complaints today. Review of Systems   Unless otherwise stated in this report or unable to obtain because of the patient's clinical or mental status as evidenced by the medical record, this patients's positive and negative responses for Review of Systems, constitutional, psych, eyes, ENT, cardiovascular, respiratory, gastrointestinal, neurological, genitourinary, musculoskeletal, integument systems and systems related to the presenting problem are either stated in the preceding or were not pertinent or were negative for the symptoms and/or complaints related to the medical problem. Past Medical History:  has a past medical history of Aortic stenosis, severe, Arthritis, Heart murmur, Heart murmur, HFrEF (heart failure with reduced ejection fraction) ([address] History of blood transfusion, Hypertension, Pleural effusion, bilateral, and Varicosities of leg. Past Surgical History:  has a past surgical history that includes  section; Hip fracture surgery (Right, In her 46s. after a fall.); thoracentesis (Right, 2020); thoracentesis (Left, 2020); Aortic valve replacement (N/A, 3/4/2020); and transesophageal echocardiogram (N/A, 3/4/2020). Social History:  reports that she has never smoked. She has never used smokeless tobacco. She reports that she does not drink alcohol and does not use drugs.   Family History: family history includes Heart Disease in her brother, father, and sister; High Blood Pressure in her mother; No Known Problems in her brother; Other in her sister. Allergies: Patient has no known allergies. Physical Exam   Vital Signs:  BP 90/60   Pulse (!) 105   Temp 98 °F (36.7 °C)   Resp 16   Ht 5' 5\" (1.651 m)   Wt 150 lb (68 kg)   SpO2 97%   BMI 24.96 kg/m²    Oxygen Saturation Interpretation: Normal.    Constitutional:  A&Ox3, development consistent with age, NAD. CV: Heart RRR, no murmurs, rubs, or gallops. Lungs: CTAB without W/R/R. Wrist: Right              Tenderness: Moderate to entire wrist            Swelling: Moderate             Deformity: No obvious deformity noted. ROM: Decreased ROM due to pain. Skin:  No erythema, rashes, or abrasions noted. Neurovascular: Motor deficit: /UE strength decreased on right due to pain. Sensory deficit:   Sensation intact proximally and distally to the injury site. Pulse deficit: 2+ and bounding. Capillary refill: Less then 2 sec throughout. Hand: Right              Tenderness: Mild              Swelling: mild. Deformity: No obvious deformity. No malrotation noted. ROM: Decreased ROM due to pain. Skin:  No abrasions, erythema, or rashes noted. Neurological:  Alert and oriented. Motor functions intact. Test Results Section   (All laboratory and radiology results have been personally reviewed by myself)  Imaging: All Radiology results interpreted by Radiologist unless otherwise noted. XR WRIST RIGHT (MIN 3 VIEWS)    Result Date: 7/14/2022  EXAMINATION: 3 XRAY VIEWS OF THE RIGHT WRIST 7/14/2022 2:24 pm COMPARISON: None.  HISTORY: ORDERING SYSTEM PROVIDED HISTORY: Fall, initial encounter TECHNOLOGIST PROVIDED HISTORY: Reason for exam:->trauma FINDINGS: Three views right wrist demonstrate an impacted, comminuted fracture of the distal right radial metaphysis which extend into the radiocarpal joint space with widening of the scapho lunate interval.  There is also a nondisplaced fractures through the base of the ulnar styloid. There is osteopenia with mild osteoarthritic changes. Impacted and comminuted distal right radial metaphyseal fracture with nondisplaced ulnar styloid fracture. RECOMMENDATION: I recommend orthopedic consultation. Assessment / Plan   Impression(s):  Saturnino Vargas was seen today for wrist pain. Diagnoses and all orders for this visit:    Fall, initial encounter  -     XR WRIST RIGHT (MIN 3 VIEWS); Future    Closed fracture of distal end of right radius, unspecified fracture morphology, initial encounter  -     [address] [facility]    Closed nondisplaced fracture of styloid process of right ulna, initial encounter  -     [address] [facility]    x-ray f right wrist obtained in office showing impacted and comminuted distal right radial metaphyseal fracture with nondisplaced ulnar styloid fracture. Patient advised of results. Patient was referred to Ortho walk-in today for splinting and follow up. ER sooner if symptoms persist, worsen, or change. Red flag symptoms discussed. All questions answered. Return if symptoms worsen or fail to improve. Electronically signed by YARITZA Stafford CNP   DD: 7/14/22    **This report was transcribed using voice recognition software. Every effort was made to ensure accuracy; however, inadvertent computerized transcription errors may be present.

## 2022-07-21 ENCOUNTER — OFFICE VISIT (OUTPATIENT)
Dept: ORTHOPEDIC SURGERY | Age: 72
End: 2022-07-21

## 2022-07-21 VITALS — HEIGHT: 65 IN | BODY MASS INDEX: 24.16 KG/M2 | WEIGHT: 145 LBS

## 2022-07-21 DIAGNOSIS — S52.501A TRAUMATIC CLOSED NONDISPLACED FRACTURE OF DISTAL END OF RIGHT RADIUS, INITIAL ENCOUNTER: Primary | ICD-10-CM

## 2022-07-21 PROCEDURE — 1123F ACP DISCUSS/DSCN MKR DOCD: CPT | Performed by: ORTHOPAEDIC SURGERY

## 2022-07-21 PROCEDURE — 99203 OFFICE O/P NEW LOW 30 MIN: CPT | Performed by: ORTHOPAEDIC SURGERY

## 2022-07-21 PROCEDURE — 25600 CLTX DST RDL FX/EPHYS SEP WO: CPT | Performed by: ORTHOPAEDIC SURGERY

## 2022-07-21 NOTE — PROGRESS NOTES
Department of Orthopedic Surgery  History and Physical      CHIEF COMPLAINT: Right wrist pain    HISTORY OF PRESENT ILLNESS:                The patient is a 66 y.o. female who presents with right wrist pain. Patient states that she slipped on a wet spot on the floor roughly 1 week ago and fell onto her right wrist.  She noted immediate pain and presented to an urgent care where x-rays were obtained showing a distal radius fracture. She was placed in a splint and is here today for follow-up exam and evaluation. She is right-hand dominant and works at home. She states that the pain is improved over the last week. She denies any numbness or tingling. Past Medical History:        Diagnosis Date    Aortic stenosis, severe     Arthritis     Heart murmur     Heart murmur     Since birth    HFrEF (heart failure with reduced ejection fraction) ([facility] [address] 02/28/2020 2/25/20- echo- LVEF 35-40%, stage II DD, moderately enlarged LA, mild MR, mild TR, critical AS, LVDD: 5.0, RVDD: 2.4    History of blood transfusion     Hypertension     Pleural effusion, bilateral 02/25/2020    Varicosities of leg      Past Surgical History:        Procedure Laterality Date    AORTIC VALVE REPLACEMENT N/A 3/4/2020    TRANSCATHETER AORTIC VALVE REPLACEMENT FEMORAL APPROACH performed by Flory Fry MD at [address] In her 46s. after a fall. Dr. Lianna Billy, [place]    THORACENTESIS Right 02/27/2020    1000 cc    THORACENTESIS Left 02/28/2020    425 cc    TRANSESOPHAGEAL ECHOCARDIOGRAM N/A 3/4/2020    GERTRUDIS performed by Flory Fry MD at [address]     Current Medications:   No current facility-administered medications for this visit. Allergies:  Patient has no known allergies. Social History:   TOBACCO:   reports that she has never smoked. She has never used smokeless tobacco.  ETOH:   reports no history of alcohol use. DRUGS:   reports no history of drug use.   ACTIVITIES OF DAILY LIVING: OCCUPATION:    Family History:       Problem Relation Age of Onset    High Blood Pressure Mother     Heart Disease Father     Heart Disease Brother         cabg    Heart Disease Sister     No Known Problems Brother     Other Sister         Tumor, unsure, possibly uterine . REVIEW OF SYSTEMS:  CONSTITUTIONAL:  negative  EYES:  negative  HEENT:  negative  RESPIRATORY:  negative  CARDIOVASCULAR: Positive for heart murmur, atrial flutter, aortic valve stenosis  GASTROINTESTINAL:  negative  INTEGUMENT/BREAST:  negative  HEMATOLOGIC/LYMPHATIC:  negative  ALLERGIC/IMMUNOLOGIC:  negative  ENDOCRINE:  negative  MUSCULOSKELETAL: Right wrist pain, swelling  NEUROLOGICAL:  negative  BEHAVIOR/PSYCH:  negative    PHYSICAL EXAM:    VITALS:  Ht 5' 5\" (1.651 m)   Wt 145 lb (65.8 kg)   BMI 24.13 kg/m²   CONSTITUTIONAL:  awake, alert, cooperative, no apparent distress, and appears stated age  EYES:  Lids and lashes normal, pupils equal, round and reactive to light. ENT:  Normocephalic, without obvious abnormality, atraumatic. NECK:  Supple, symmetrical, trachea midline. LUNGS: Equal chest rise bilaterally  CARDIOVASCULAR:  2+ radial pulses, extremities warm and well perfused  ABDOMEN: Nondistended  CHEST: Equal chest rise bilaterally  GENITAL/URINARY:  deferred  NEUROLOGIC:  Awake, alert, oriented to name, place and time. MUSCULOSKELETAL:    Right upper extremity: Upon gross examination patient has some swelling and bruising about the distal radius. She has tenderness to palpation about the volar and dorsal region of the distal radius. She is palpable radial pulse. Sensation intact in the median/ulnar/radial nerve distribution. Fires AIN/PIN/ulnar nerve function. Palpable radial pulse, brisk cap refill. Minimal gross deformity appreciated.     DATA:    CBC:   Lab Results   Component Value Date/Time    WBC 8.1 06/02/2020 12:00 PM    RBC 4.30 06/02/2020 12:00 PM    HGB 11.2 06/02/2020 12:00 PM    HCT 36.9 06/02/2020 12:00 PM    MCV 85.8 06/02/2020 12:00 PM    MCH 26.0 06/02/2020 12:00 PM    MCHC 30.4 06/02/2020 12:00 PM    RDW 16.0 06/02/2020 12:00 PM     06/02/2020 12:00 PM    MPV 10.7 06/02/2020 12:00 PM     PT/INR:    Lab Results   Component Value Date/Time    PROTIME 12.7 02/25/2020 05:30 AM    INR 1.1 02/25/2020 05:30 AM       Radiology Review:  Xray: x-rays of the right wrist were obtained today in the office and reviewed with the patient. 3 views: PA/oblique/lateral: demonstrate an intra-articular distal radius fracture with minor displacement special about the styloid. Comparatively to previous x-rays there is mild interval displacement. Impression: Comminuted distal radius fracture    IMPRESSION:  Comminuted distal radius fracture right, closed  Heart murmur, atrial flutter, aortic valve stenosis    PLAN:  Discussed findings with the patient at today's visit. Discussed conservative and surgical management with the patient. We discussed conservative and nonconservative management today. We elected to proceed with nonoperative management including casting today. We will have her follow-up in 3 to 4 weeks for repeat x-rays and evaluation out of cast.  She may be transitioned to wrist brace at that time if the fracture has not moved significantly. Patient and family are updated and agreeable to plan. I have seen and evaluated the patient and agree with the above assessment and plan on today's visit. I have performed the key components of the history and physical examination with significant findings of closed right distal radius fracture. I concur with the findings and plan as documented.     Maribel Landin MD  7/21/2022

## 2022-07-21 NOTE — PROGRESS NOTES
A fiberglass short arm cast was applied to Her Right arm. Neurovascular status was checked pre and post application. Patient was neurovascularly intact after the application process. The patient denied any issues with fit or comfort of the cast/splint. advised to avoid activities that put them at risk for falling. Patient instructed to call our office if there are any issues with the cast/splint.

## 2022-08-18 ENCOUNTER — OFFICE VISIT (OUTPATIENT)
Dept: ORTHOPEDIC SURGERY | Age: 72
End: 2022-08-18

## 2022-08-18 VITALS — HEIGHT: 66 IN | WEIGHT: 144 LBS | BODY MASS INDEX: 23.14 KG/M2

## 2022-08-18 DIAGNOSIS — S52.501A TRAUMATIC CLOSED NONDISPLACED FRACTURE OF DISTAL END OF RIGHT RADIUS, INITIAL ENCOUNTER: Primary | ICD-10-CM

## 2022-08-18 PROCEDURE — 99024 POSTOP FOLLOW-UP VISIT: CPT | Performed by: ORTHOPAEDIC SURGERY

## 2022-08-18 NOTE — PROGRESS NOTES
5 weeks out nonoperative management distal radius fracture. Overall she is doing well. Cast was removed. She did report she burned herself on making elderberry elixer. Otherwise she feels the wrist is doing well. Physical exam: Cast was removed. She did have a first-degree burn over the palm of the hand. Minimal swelling. Minimal tenderness about the wrist.  She does have full supination. However she does have limited pronation to neutral.  She has minimal tenderness around the DRUJ. Otherwise neurovascular intact. X-rays in office today: AP lateral obliques of the right wrist were obtained. This demonstrates healing distal radius fracture and maintained osseous alignment. The DRUJ appears to be well reduced on the lateral view. Impression office x-rays: Healing distal radius fracture    Assessment 5 weeks out nonoperative management    Plan    Today's findings were explained the patient. She fitted with a cock-up wrist brace. She was instructed on range of motion exercises particularly focusing on pronation and wrist flexion extension. Follow-up in 3 to 4 weeks.

## 2022-09-19 ENCOUNTER — OFFICE VISIT (OUTPATIENT)
Dept: ORTHOPEDIC SURGERY | Age: 72
End: 2022-09-19

## 2022-09-19 VITALS — RESPIRATION RATE: 20 BRPM | WEIGHT: 140 LBS | HEIGHT: 66 IN | BODY MASS INDEX: 22.5 KG/M2

## 2022-09-19 DIAGNOSIS — S52.501A TRAUMATIC CLOSED NONDISPLACED FRACTURE OF DISTAL END OF RIGHT RADIUS, INITIAL ENCOUNTER: Primary | ICD-10-CM

## 2022-09-19 PROCEDURE — 99024 POSTOP FOLLOW-UP VISIT: CPT | Performed by: ORTHOPAEDIC SURGERY

## 2022-09-19 NOTE — PROGRESS NOTES
9 weeks out nonoperative management distal radius fracture. Overall she is doing well. Reports minimal pain. She has been using her wrist for shar. Pain has almost resolved. Physical exam: Minimal tenderness about the distal radius. - tenderness about the TFCC. She does have limited pronation and supination. Otherwise neurovascular intact. X-rays in office today: AP lateral obliques of the right wrist were obtained. This demonstrates healing distal radius fracture and maintained osseous alignment. The DRUJ appears to be well reduced on the lateral view. She does have ulnar positive variance. Impression office x-rays: Healing distal radius fracture with ulnar positive variance    Assessment 9 weeks out nonoperative management    Plan    Today's findings were explained the patient. Okay to wean out of brace as tolerated. Okay to be into activities as tolerated. Follow up PRN. I have seen and evaluated the patient and agree with the above assessment and plan on today's visit. I have performed the key components of the history and physical examination with significant findings of healed distal radius fracture. I concur with the findings and plan as documented.     Emelina Hargrove MD  9/19/2022

## 2023-04-03 ENCOUNTER — TELEPHONE (OUTPATIENT)
Dept: FAMILY MEDICINE CLINIC | Age: 73
End: 2023-04-03

## 2023-04-03 NOTE — TELEPHONE ENCOUNTER
Asher Aleida stopped at my window after Her  was seen. She wanted to schedule for the [address] but we are booked out until August. She thought that was way to long and thought then maybe you could sent over an antibiotic to [facility] in [place]. Please advise if you would be willing to do so?

## 2023-04-04 RX ORDER — AMOXICILLIN 500 MG/1
500 CAPSULE ORAL 2 TIMES DAILY
Qty: 14 CAPSULE | Refills: 0 | Status: SHIPPED | OUTPATIENT
Start: 2023-04-04 | End: 2023-04-11

## 2023-04-04 NOTE — TELEPHONE ENCOUNTER
I sent Amoxicillin to pharmacy to cover for general dental infections, but I really don't know what I'm treating. So if she is not improving or worsening she definitely needs to see a dentist.     This is the number for the dental clinic in [facility]. Phone: [phone]      Up in [address] dental clinic at [facility] on [facility] does walk-in care.  (I would want them to verify that first before they go)

## 2023-05-10 ENCOUNTER — TELEPHONE (OUTPATIENT)
Dept: FAMILY MEDICINE CLINIC | Age: 73
End: 2023-05-10

## 2023-05-10 NOTE — TELEPHONE ENCOUNTER
Wendi Ivey -   [phone]       He is calling for an appointment with you to evaluate Bridgett Bernheim for recent changes. She has changes in her speech, unsteady hands and unsteady gate. Her kids wanted her to go to the ER, but she is refusing. I urged him to take her the ER, but she is not willing. They both do not think it is a big deal, and just want an appointment with Dr Gertrudis Lewis. What is your advise?

## 2023-05-10 NOTE — TELEPHONE ENCOUNTER
I spoke to Pam Cruz and relayed the doctor's message that getting treatment soon is very important to the recovery of a stroke, if that is the problem. He said he will relay this to Marika and see what she says.

## 2023-05-10 NOTE — TELEPHONE ENCOUNTER
Please let them know this could possibly be a stroke. I am not in the office again until Friday and if I saw her and thought it was a stroke, I would make the recommendation of going to the ED anyway. Fast diagnosis and treatment is important for recovery from strokes. So yes, I strongly recommend they seek care at the emergency room to try to have the best possibly outcome if this truly is a stroke. Urgent care would not be able to handle these symptoms for the same reasons, so it is not the correct place to go.

## 2023-10-20 ENCOUNTER — TELEPHONE (OUTPATIENT)
Dept: FAMILY MEDICINE CLINIC | Age: 73
End: 2023-10-20

## 2023-10-20 NOTE — TELEPHONE ENCOUNTER
----- Message from Makayla Mcallister sent at 10/19/2023 10:39 AM EDT -----  Subject: Appointment Request    Reason for Call: Established Patient Appointment needed: Routine Existing   Condition Follow Up    QUESTIONS    Reason for appointment request? Available appointments did not meet   patient need     Additional Information for Provider?  Patient is wanting to get in for a   follow up same time as her  Saturnino Castro if possible.   ---------------------------------------------------------------------------  --------------  Maricruz Dai UDQD  [phone]; OK to leave message on voicemail  ---------------------------------------------------------------------------  --------------  SCRIPT ANSWERS

## 2023-10-20 NOTE — TELEPHONE ENCOUNTER
Steve Mcelroy is wanting to schedule at the same time as Erving Late; where would you advise we schedule them both?

## 2023-10-23 NOTE — TELEPHONE ENCOUNTER
Spoke with Will More; they are both scheduled for this Wednesday, 10/25. She will call back if that appointment ends up not working for them, as they are planning to go out of town this week.

## 2023-10-25 ENCOUNTER — OFFICE VISIT (OUTPATIENT)
Dept: FAMILY MEDICINE CLINIC | Age: 73
End: 2023-10-25

## 2023-10-25 VITALS
WEIGHT: 132 LBS | DIASTOLIC BLOOD PRESSURE: 60 MMHG | HEART RATE: 68 BPM | OXYGEN SATURATION: 96 % | TEMPERATURE: 97.6 F | HEIGHT: 66 IN | SYSTOLIC BLOOD PRESSURE: 100 MMHG | BODY MASS INDEX: 21.21 KG/M2 | RESPIRATION RATE: 16 BRPM

## 2023-10-25 DIAGNOSIS — Z95.2 AORTIC VALVE REPLACED: ICD-10-CM

## 2023-10-25 DIAGNOSIS — R63.4 WEIGHT LOSS: ICD-10-CM

## 2023-10-25 DIAGNOSIS — R63.4 WEIGHT LOSS: Primary | ICD-10-CM

## 2023-10-25 DIAGNOSIS — R25.1 TREMOR: ICD-10-CM

## 2023-10-25 LAB
ABSOLUTE IMMATURE GRANULOCYTE: 0.05 K/UL (ref 0–0.58)
ALBUMIN SERPL-MCNC: 4.4 G/DL (ref 3.5–5.2)
ALP BLD-CCNC: 153 U/L (ref 35–104)
ALT SERPL-CCNC: 13 U/L (ref 0–32)
ANION GAP SERPL CALCULATED.3IONS-SCNC: 18 MMOL/L (ref 7–16)
AST SERPL-CCNC: 29 U/L (ref 0–31)
BASOPHILS ABSOLUTE: 0.14 K/UL (ref 0–0.2)
BASOPHILS RELATIVE PERCENT: 2 % (ref 0–2)
BILIRUB SERPL-MCNC: 0.5 MG/DL (ref 0–1.2)
BUN BLDV-MCNC: 21 MG/DL (ref 6–23)
CALCIUM SERPL-MCNC: 9.5 MG/DL (ref 8.6–10.2)
CHLORIDE BLD-SCNC: 103 MMOL/L (ref 98–107)
CHOLESTEROL: 170 MG/DL
CO2: 19 MMOL/L (ref 22–29)
CREAT SERPL-MCNC: 0.6 MG/DL (ref 0.5–1)
EOSINOPHILS ABSOLUTE: 0.21 K/UL (ref 0.05–0.5)
EOSINOPHILS RELATIVE PERCENT: 2 % (ref 0–6)
GFR SERPL CREATININE-BSD FRML MDRD: >60 ML/MIN/1.73M2
GLUCOSE BLD-MCNC: 89 MG/DL (ref 74–99)
HCT VFR BLD CALC: 59.5 % (ref 34–48)
HDLC SERPL-MCNC: 53 MG/DL
HEMOGLOBIN: 17.8 G/DL (ref 11.5–15.5)
IMMATURE GRANULOCYTES: 1 % (ref 0–5)
LDL CHOLESTEROL: 92 MG/DL
LYMPHOCYTES ABSOLUTE: 1.83 K/UL (ref 1.5–4)
LYMPHOCYTES RELATIVE PERCENT: 19 % (ref 20–42)
MCH RBC QN AUTO: 27.7 PG (ref 26–35)
MCHC RBC AUTO-ENTMCNC: 29.9 G/DL (ref 32–34.5)
MCV RBC AUTO: 92.5 FL (ref 80–99.9)
MONOCYTES ABSOLUTE: 0.65 K/UL (ref 0.1–0.95)
MONOCYTES RELATIVE PERCENT: 7 % (ref 2–12)
NEUTROPHILS ABSOLUTE: 6.73 K/UL (ref 1.8–7.3)
NEUTROPHILS RELATIVE PERCENT: 70 % (ref 43–80)
PDW BLD-RTO: 16.1 % (ref 11.5–15)
PLATELET # BLD: 384 K/UL (ref 130–450)
PMV BLD AUTO: 10.2 FL (ref 7–12)
POTASSIUM SERPL-SCNC: 4.6 MMOL/L (ref 3.5–5)
RBC # BLD: 6.43 M/UL (ref 3.5–5.5)
SODIUM BLD-SCNC: 140 MMOL/L (ref 132–146)
TOTAL PROTEIN: 7.6 G/DL (ref 6.4–8.3)
TRIGL SERPL-MCNC: 127 MG/DL
TSH SERPL DL<=0.05 MIU/L-ACNC: 0.51 UIU/ML (ref 0.27–4.2)
VLDLC SERPL CALC-MCNC: 25 MG/DL
WBC # BLD: 9.6 K/UL (ref 4.5–11.5)

## 2023-10-25 PROCEDURE — 1123F ACP DISCUSS/DSCN MKR DOCD: CPT | Performed by: FAMILY MEDICINE

## 2023-10-25 PROCEDURE — 99214 OFFICE O/P EST MOD 30 MIN: CPT | Performed by: FAMILY MEDICINE

## 2023-10-25 SDOH — ECONOMIC STABILITY: HOUSING INSECURITY
IN THE LAST 12 MONTHS, WAS THERE A TIME WHEN YOU DID NOT HAVE A STEADY PLACE TO SLEEP OR SLEPT IN A SHELTER (INCLUDING NOW)?: NO

## 2023-10-25 SDOH — ECONOMIC STABILITY: INCOME INSECURITY: HOW HARD IS IT FOR YOU TO PAY FOR THE VERY BASICS LIKE FOOD, HOUSING, MEDICAL CARE, AND HEATING?: NOT HARD AT ALL

## 2023-10-25 SDOH — ECONOMIC STABILITY: FOOD INSECURITY: WITHIN THE PAST 12 MONTHS, YOU WORRIED THAT YOUR FOOD WOULD RUN OUT BEFORE YOU GOT MONEY TO BUY MORE.: NEVER TRUE

## 2023-10-25 SDOH — ECONOMIC STABILITY: FOOD INSECURITY: WITHIN THE PAST 12 MONTHS, THE FOOD YOU BOUGHT JUST DIDN'T LAST AND YOU DIDN'T HAVE MONEY TO GET MORE.: NEVER TRUE

## 2023-10-25 ASSESSMENT — PATIENT HEALTH QUESTIONNAIRE - PHQ9
SUM OF ALL RESPONSES TO PHQ QUESTIONS 1-9: 0
1. LITTLE INTEREST OR PLEASURE IN DOING THINGS: 0
SUM OF ALL RESPONSES TO PHQ QUESTIONS 1-9: 0
SUM OF ALL RESPONSES TO PHQ9 QUESTIONS 1 & 2: 0
SUM OF ALL RESPONSES TO PHQ QUESTIONS 1-9: 0
SUM OF ALL RESPONSES TO PHQ QUESTIONS 1-9: 0
2. FEELING DOWN, DEPRESSED OR HOPELESS: 0

## 2023-10-25 NOTE — PROGRESS NOTES
review. Aortic valve replaced  -     Lipid Panel; Future    Tremor  Suspect this is probably a mild essential tremor. Counseled. Not bothering her enough to warrant medication at this time. As needed or as scheduled. Patient counseled to follow up sooner or seek more acute care if symptoms worsening or not improving according to plan. Electronically signed by Diego Huang MD on 10/25/2023    _________________________________________________________  Current Outpatient Medications on File Prior to Visit   Medication Sig Dispense Refill    ibuprofen (ADVIL;MOTRIN) 800 MG tablet Take 1 tablet by mouth every 8 hours as needed for Pain Take with food, discontinue if GI upset 20 tablet 0    NONFORMULARY NONFORMULARY NONFORMULARY Indications: hawthorn berry, soy lecithin, kelp white oak bark, lira seal root Indications: hawthorn berry, soy lecithin, kelp white oak bark, lira seal root 0 Active  Historical Provider Historical Provider [facility] ([id number])      magnesium oxide (MAG-OX) 400 MG tablet Take 1 tablet by mouth daily      calcium carbonate (OSCAL) 500 MG TABS tablet Take 1 tablet by mouth daily Indications: PT TAKES IT 3 X WEEKLY      Vitamin E 400 units TABS Take 400 Units by mouth 2 times daily       Coenzyme Q10 (CO Q-10) 200 MG CAPS Take 1 capsule by mouth in the morning and 1 capsule in the evening. vitamin C (ASCORBIC ACID) 500 MG tablet Take 1 tablet by mouth daily      b complex vitamins capsule Take 1 capsule by mouth daily      Cod Liver Oil 1000 MG CAPS Take by mouth       No current facility-administered medications on file prior to visit.        Patient Active Problem List   Diagnosis Code    Heart murmur R01.1    Atrial flutter (HCC) I48.92    Critical aortic valve stenosis I35.0    Red blood cell antibody positive R76.8    Aortic valve replaced Z95.2     _________________________________________________________  Past Medical History:   Diagnosis Date    Aortic

## 2024-01-12 ENCOUNTER — TELEPHONE (OUTPATIENT)
Dept: FAMILY MEDICINE CLINIC | Age: 74
End: 2024-01-12

## 2024-01-12 NOTE — TELEPHONE ENCOUNTER
Scottie -        He is calling about her recent visit with a Neurologist in [place].    They was told that she does not have parkinson's disease, so he is reluctant to prescribe medications.         I asked him who referred her to that doctor, and he said no one did.  It was a suggestion from their kids that she see this doctor.      He is calling today to let you know to be on the lookout for the report.

## 2024-01-30 LAB — B BURGDOR AB SER IA-ACNC: 0.56 IV

## 2024-02-07 ENCOUNTER — APPOINTMENT (OUTPATIENT)
Dept: GENERAL RADIOLOGY | Age: 74
DRG: 522 | End: 2024-02-07

## 2024-02-07 ENCOUNTER — HOSPITAL ENCOUNTER (INPATIENT)
Age: 74
LOS: 2 days | Discharge: HOME | DRG: 522 | End: 2024-02-10
Attending: EMERGENCY MEDICINE | Admitting: FAMILY MEDICINE
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
## 2024-02-07 DIAGNOSIS — S52.501A CLOSED TRAUMATIC NONDISPLACED FRACTURE OF DISTAL END OF RIGHT RADIUS, INITIAL ENCOUNTER: ICD-10-CM

## 2024-02-07 DIAGNOSIS — S72.002A CLOSED FRACTURE OF NECK OF LEFT FEMUR, INITIAL ENCOUNTER (HCC): ICD-10-CM

## 2024-02-07 DIAGNOSIS — S72.002A CLOSED LEFT HIP FRACTURE (HCC): ICD-10-CM

## 2024-02-07 DIAGNOSIS — M25.331 SCAPHOLUNATE DISSOCIATION OF RIGHT WRIST: ICD-10-CM

## 2024-02-07 PROBLEM — S72.145P: Status: ACTIVE | Noted: 2024-02-07

## 2024-02-07 LAB
ANION GAP SERPL CALCULATED.3IONS-SCNC: 12 MMOL/L (ref 7–16)
BASOPHILS # BLD: 0.11 K/UL (ref 0–0.2)
BASOPHILS NFR BLD: 1 % (ref 0–2)
BUN SERPL-MCNC: 22 MG/DL (ref 6–23)
CALCIUM SERPL-MCNC: 9.3 MG/DL (ref 8.6–10.2)
CHLORIDE SERPL-SCNC: 98 MMOL/L (ref 98–107)
CO2 SERPL-SCNC: 25 MMOL/L (ref 22–29)
CREAT SERPL-MCNC: 0.6 MG/DL (ref 0.5–1)
EOSINOPHIL # BLD: 0.02 K/UL (ref 0.05–0.5)
EOSINOPHILS RELATIVE PERCENT: 0 % (ref 0–6)
ERYTHROCYTE [DISTWIDTH] IN BLOOD BY AUTOMATED COUNT: 18.2 % (ref 11.5–15)
GFR SERPL CREATININE-BSD FRML MDRD: >60 ML/MIN/1.73M2
GLUCOSE SERPL-MCNC: 151 MG/DL (ref 74–99)
HCT VFR BLD AUTO: 60.5 % (ref 34–48)
HGB BLD-MCNC: 19.3 G/DL (ref 11.5–15.5)
IMM GRANULOCYTES # BLD AUTO: 0.18 K/UL (ref 0–0.58)
IMM GRANULOCYTES NFR BLD: 1 % (ref 0–5)
INR PPP: 1.1
LYMPHOCYTES NFR BLD: 0.77 K/UL (ref 1.5–4)
LYMPHOCYTES RELATIVE PERCENT: 5 % (ref 20–42)
MCH RBC QN AUTO: 27.6 PG (ref 26–35)
MCHC RBC AUTO-ENTMCNC: 31.9 G/DL (ref 32–34.5)
MCV RBC AUTO: 86.6 FL (ref 80–99.9)
MONOCYTES NFR BLD: 0.95 K/UL (ref 0.1–0.95)
MONOCYTES NFR BLD: 6 % (ref 2–12)
NEUTROPHILS NFR BLD: 87 % (ref 43–80)
NEUTS SEG NFR BLD: 13.64 K/UL (ref 1.8–7.3)
PLATELET # BLD AUTO: 266 K/UL (ref 130–450)
PMV BLD AUTO: 10.1 FL (ref 7–12)
POTASSIUM SERPL-SCNC: 3.9 MMOL/L (ref 3.5–5)
PROTHROMBIN TIME: 12.5 SEC (ref 9.3–12.4)
RBC # BLD AUTO: 6.99 M/UL (ref 3.5–5.5)
SODIUM SERPL-SCNC: 135 MMOL/L (ref 132–146)
WBC OTHER # BLD: 15.7 K/UL (ref 4.5–11.5)

## 2024-02-07 PROCEDURE — 86901 BLOOD TYPING SEROLOGIC RH(D): CPT

## 2024-02-07 PROCEDURE — 85610 PROTHROMBIN TIME: CPT

## 2024-02-07 PROCEDURE — 86870 RBC ANTIBODY IDENTIFICATION: CPT

## 2024-02-07 PROCEDURE — 6370000000 HC RX 637 (ALT 250 FOR IP): Performed by: EMERGENCY MEDICINE

## 2024-02-07 PROCEDURE — 71045 X-RAY EXAM CHEST 1 VIEW: CPT

## 2024-02-07 PROCEDURE — 99285 EMERGENCY DEPT VISIT HI MDM: CPT

## 2024-02-07 PROCEDURE — 2580000003 HC RX 258: Performed by: EMERGENCY MEDICINE

## 2024-02-07 PROCEDURE — 81001 URINALYSIS AUTO W/SCOPE: CPT

## 2024-02-07 PROCEDURE — 93005 ELECTROCARDIOGRAM TRACING: CPT | Performed by: EMERGENCY MEDICINE

## 2024-02-07 PROCEDURE — 86880 COOMBS TEST DIRECT: CPT

## 2024-02-07 PROCEDURE — G0378 HOSPITAL OBSERVATION PER HR: HCPCS

## 2024-02-07 PROCEDURE — 73502 X-RAY EXAM HIP UNI 2-3 VIEWS: CPT

## 2024-02-07 PROCEDURE — 73562 X-RAY EXAM OF KNEE 3: CPT

## 2024-02-07 PROCEDURE — 80048 BASIC METABOLIC PNL TOTAL CA: CPT

## 2024-02-07 PROCEDURE — 86850 RBC ANTIBODY SCREEN: CPT

## 2024-02-07 PROCEDURE — 86905 BLOOD TYPING RBC ANTIGENS: CPT

## 2024-02-07 PROCEDURE — 96361 HYDRATE IV INFUSION ADD-ON: CPT

## 2024-02-07 PROCEDURE — 85025 COMPLETE CBC W/AUTO DIFF WBC: CPT

## 2024-02-07 PROCEDURE — 96360 HYDRATION IV INFUSION INIT: CPT

## 2024-02-07 PROCEDURE — 86900 BLOOD TYPING SEROLOGIC ABO: CPT

## 2024-02-07 PROCEDURE — 73552 X-RAY EXAM OF FEMUR 2/>: CPT

## 2024-02-07 RX ORDER — 0.9 % SODIUM CHLORIDE 0.9 %
1000 INTRAVENOUS SOLUTION INTRAVENOUS ONCE
Status: COMPLETED | OUTPATIENT
Start: 2024-02-07 | End: 2024-02-07

## 2024-02-07 RX ORDER — 0.9 % SODIUM CHLORIDE 0.9 %
1000 INTRAVENOUS SOLUTION INTRAVENOUS ONCE
Status: COMPLETED | OUTPATIENT
Start: 2024-02-07 | End: 2024-02-08

## 2024-02-07 RX ORDER — HYDROCODONE BITARTRATE AND ACETAMINOPHEN 5; 325 MG/1; MG/1
2 TABLET ORAL ONCE
Status: COMPLETED | OUTPATIENT
Start: 2024-02-07 | End: 2024-02-07

## 2024-02-07 RX ADMIN — HYDROCODONE BITARTRATE AND ACETAMINOPHEN 2 TABLET: 5; 325 TABLET ORAL at 20:47

## 2024-02-07 RX ADMIN — SODIUM CHLORIDE 1000 ML: 9 INJECTION, SOLUTION INTRAVENOUS at 22:16

## 2024-02-07 RX ADMIN — SODIUM CHLORIDE 1000 ML: 9 INJECTION, SOLUTION INTRAVENOUS at 23:29

## 2024-02-07 ASSESSMENT — ENCOUNTER SYMPTOMS
SHORTNESS OF BREATH: 0
COUGH: 0
CONSTIPATION: 0
DIARRHEA: 0

## 2024-02-07 ASSESSMENT — PAIN SCALES - GENERAL: PAINLEVEL_OUTOF10: 10

## 2024-02-07 ASSESSMENT — PAIN - FUNCTIONAL ASSESSMENT: PAIN_FUNCTIONAL_ASSESSMENT: 0-10

## 2024-02-07 ASSESSMENT — PAIN DESCRIPTION - PAIN TYPE: TYPE: ACUTE PAIN

## 2024-02-07 ASSESSMENT — LIFESTYLE VARIABLES
HOW MANY STANDARD DRINKS CONTAINING ALCOHOL DO YOU HAVE ON A TYPICAL DAY: PATIENT DOES NOT DRINK
HOW OFTEN DO YOU HAVE A DRINK CONTAINING ALCOHOL: NEVER

## 2024-02-08 ENCOUNTER — ANESTHESIA (OUTPATIENT)
Dept: OPERATING ROOM | Age: 74
End: 2024-02-08

## 2024-02-08 ENCOUNTER — ANESTHESIA EVENT (OUTPATIENT)
Dept: OPERATING ROOM | Age: 74
End: 2024-02-08

## 2024-02-08 ENCOUNTER — APPOINTMENT (OUTPATIENT)
Dept: GENERAL RADIOLOGY | Age: 74
DRG: 522 | End: 2024-02-08

## 2024-02-08 PROBLEM — S72.002A CLOSED FRACTURE OF NECK OF LEFT FEMUR (HCC): Status: ACTIVE | Noted: 2024-02-08
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
## 2024-02-08 PROBLEM — G24.9 NEUROLOGICAL MOVEMENT DISORDER: Status: ACTIVE | Noted: 2024-02-08

## 2024-02-08 PROBLEM — S72.002A CLOSED LEFT HIP FRACTURE (HCC): Status: ACTIVE | Noted: 2024-02-07

## 2024-02-08 PROBLEM — R94.31 ABNORMAL EKG: Status: ACTIVE | Noted: 2024-02-08

## 2024-02-08 LAB
ALBUMIN SERPL-MCNC: 3.4 G/DL (ref 3.5–5.2)
ALBUMIN SERPL-MCNC: 3.7 G/DL (ref 3.5–5.2)
ALP SERPL-CCNC: 114 U/L (ref 35–104)
ALP SERPL-CCNC: 123 U/L (ref 35–104)
ALT SERPL-CCNC: 32 U/L (ref 0–32)
ALT SERPL-CCNC: 34 U/L (ref 0–32)
ANION GAP SERPL CALCULATED.3IONS-SCNC: 10 MMOL/L (ref 7–16)
ANION GAP SERPL CALCULATED.3IONS-SCNC: 10 MMOL/L (ref 7–16)
AST SERPL-CCNC: 34 U/L (ref 0–31)
AST SERPL-CCNC: 35 U/L (ref 0–31)
BACTERIA URNS QL MICRO: ABNORMAL
BASOPHILS # BLD: 0.09 K/UL (ref 0–0.2)
BASOPHILS NFR BLD: 1 % (ref 0–2)
BILIRUB SERPL-MCNC: 1.1 MG/DL (ref 0–1.2)
BILIRUB SERPL-MCNC: 1.3 MG/DL (ref 0–1.2)
BILIRUB UR QL STRIP: NEGATIVE
BUN SERPL-MCNC: 16 MG/DL (ref 6–23)
BUN SERPL-MCNC: 17 MG/DL (ref 6–23)
CALCIUM SERPL-MCNC: 8.4 MG/DL (ref 8.6–10.2)
CALCIUM SERPL-MCNC: 8.6 MG/DL (ref 8.6–10.2)
CHLORIDE SERPL-SCNC: 104 MMOL/L (ref 98–107)
CHLORIDE SERPL-SCNC: 106 MMOL/L (ref 98–107)
CLARITY UR: CLEAR
CO2 SERPL-SCNC: 22 MMOL/L (ref 22–29)
CO2 SERPL-SCNC: 22 MMOL/L (ref 22–29)
COLOR UR: YELLOW
CREAT SERPL-MCNC: 0.5 MG/DL (ref 0.5–1)
CREAT SERPL-MCNC: 0.5 MG/DL (ref 0.5–1)
EKG ATRIAL RATE: 107 BPM
EKG P AXIS: 72 DEGREES
EKG P-R INTERVAL: 168 MS
EKG Q-T INTERVAL: 352 MS
EKG QRS DURATION: 92 MS
EKG QTC CALCULATION (BAZETT): 469 MS
EKG R AXIS: -62 DEGREES
EKG T AXIS: 83 DEGREES
EKG VENTRICULAR RATE: 107 BPM
EOSINOPHIL # BLD: 0.02 K/UL (ref 0.05–0.5)
EOSINOPHILS RELATIVE PERCENT: 0 % (ref 0–6)
ERYTHROCYTE [DISTWIDTH] IN BLOOD BY AUTOMATED COUNT: 17.4 % (ref 11.5–15)
GFR SERPL CREATININE-BSD FRML MDRD: >60 ML/MIN/1.73M2
GFR SERPL CREATININE-BSD FRML MDRD: >60 ML/MIN/1.73M2
GLUCOSE SERPL-MCNC: 114 MG/DL (ref 74–99)
GLUCOSE SERPL-MCNC: 114 MG/DL (ref 74–99)
GLUCOSE UR STRIP-MCNC: NEGATIVE MG/DL
HCT VFR BLD AUTO: 55.6 % (ref 34–48)
HGB BLD-MCNC: 17.8 G/DL (ref 11.5–15.5)
HGB UR QL STRIP.AUTO: ABNORMAL
IMM GRANULOCYTES # BLD AUTO: 0.09 K/UL (ref 0–0.58)
IMM GRANULOCYTES NFR BLD: 1 % (ref 0–5)
KETONES UR STRIP-MCNC: 40 MG/DL
LEUKOCYTE ESTERASE UR QL STRIP: NEGATIVE
LYMPHOCYTES NFR BLD: 0.97 K/UL (ref 1.5–4)
LYMPHOCYTES RELATIVE PERCENT: 8 % (ref 20–42)
MCH RBC QN AUTO: 27.9 PG (ref 26–35)
MCHC RBC AUTO-ENTMCNC: 32 G/DL (ref 32–34.5)
MCV RBC AUTO: 87.3 FL (ref 80–99.9)
MONOCYTES NFR BLD: 0.89 K/UL (ref 0.1–0.95)
MONOCYTES NFR BLD: 7 % (ref 2–12)
NEUTROPHILS NFR BLD: 84 % (ref 43–80)
NEUTS SEG NFR BLD: 10.58 K/UL (ref 1.8–7.3)
NITRITE UR QL STRIP: NEGATIVE
PH UR STRIP: 6.5 [PH] (ref 5–9)
PLATELET # BLD AUTO: 240 K/UL (ref 130–450)
PMV BLD AUTO: 9.9 FL (ref 7–12)
POTASSIUM SERPL-SCNC: 3.7 MMOL/L (ref 3.5–5)
POTASSIUM SERPL-SCNC: 3.9 MMOL/L (ref 3.5–5)
PROT SERPL-MCNC: 6.1 G/DL (ref 6.4–8.3)
PROT SERPL-MCNC: 6.5 G/DL (ref 6.4–8.3)
PROT UR STRIP-MCNC: ABNORMAL MG/DL
RBC # BLD AUTO: 6.37 M/UL (ref 3.5–5.5)
RBC #/AREA URNS HPF: ABNORMAL /HPF
SODIUM SERPL-SCNC: 136 MMOL/L (ref 132–146)
SODIUM SERPL-SCNC: 138 MMOL/L (ref 132–146)
SP GR UR STRIP: 1.02 (ref 1–1.03)
UROBILINOGEN UR STRIP-ACNC: 0.2 EU/DL (ref 0–1)
WBC #/AREA URNS HPF: ABNORMAL /HPF
WBC OTHER # BLD: 12.6 K/UL (ref 4.5–11.5)

## 2024-02-08 PROCEDURE — 6360000002 HC RX W HCPCS: Performed by: STUDENT IN AN ORGANIZED HEALTH CARE EDUCATION/TRAINING PROGRAM

## 2024-02-08 PROCEDURE — 80053 COMPREHEN METABOLIC PANEL: CPT

## 2024-02-08 PROCEDURE — 2709999900 HC NON-CHARGEABLE SUPPLY: Performed by: STUDENT IN AN ORGANIZED HEALTH CARE EDUCATION/TRAINING PROGRAM

## 2024-02-08 PROCEDURE — 2580000003 HC RX 258: Performed by: NURSE ANESTHETIST, CERTIFIED REGISTERED

## 2024-02-08 PROCEDURE — 6360000002 HC RX W HCPCS: Performed by: NURSE ANESTHETIST, CERTIFIED REGISTERED

## 2024-02-08 PROCEDURE — 2580000003 HC RX 258: Performed by: STUDENT IN AN ORGANIZED HEALTH CARE EDUCATION/TRAINING PROGRAM

## 2024-02-08 PROCEDURE — 0SRS019 REPLACEMENT OF LEFT HIP JOINT, FEMORAL SURFACE WITH METAL SYNTHETIC SUBSTITUTE, CEMENTED, OPEN APPROACH: ICD-10-PCS | Performed by: STUDENT IN AN ORGANIZED HEALTH CARE EDUCATION/TRAINING PROGRAM

## 2024-02-08 PROCEDURE — 93010 ELECTROCARDIOGRAM REPORT: CPT | Performed by: INTERNAL MEDICINE

## 2024-02-08 PROCEDURE — 3600000013 HC SURGERY LEVEL 3 ADDTL 15MIN: Performed by: STUDENT IN AN ORGANIZED HEALTH CARE EDUCATION/TRAINING PROGRAM

## 2024-02-08 PROCEDURE — 2500000003 HC RX 250 WO HCPCS: Performed by: NURSE ANESTHETIST, CERTIFIED REGISTERED

## 2024-02-08 PROCEDURE — 85025 COMPLETE CBC W/AUTO DIFF WBC: CPT

## 2024-02-08 PROCEDURE — C1776 JOINT DEVICE (IMPLANTABLE): HCPCS | Performed by: STUDENT IN AN ORGANIZED HEALTH CARE EDUCATION/TRAINING PROGRAM

## 2024-02-08 PROCEDURE — 2580000003 HC RX 258

## 2024-02-08 PROCEDURE — G0378 HOSPITAL OBSERVATION PER HR: HCPCS

## 2024-02-08 PROCEDURE — C1713 ANCHOR/SCREW BN/BN,TIS/BN: HCPCS | Performed by: STUDENT IN AN ORGANIZED HEALTH CARE EDUCATION/TRAINING PROGRAM

## 2024-02-08 PROCEDURE — 7100000000 HC PACU RECOVERY - FIRST 15 MIN: Performed by: STUDENT IN AN ORGANIZED HEALTH CARE EDUCATION/TRAINING PROGRAM

## 2024-02-08 PROCEDURE — 87081 CULTURE SCREEN ONLY: CPT

## 2024-02-08 PROCEDURE — 3600000003 HC SURGERY LEVEL 3 BASE: Performed by: STUDENT IN AN ORGANIZED HEALTH CARE EDUCATION/TRAINING PROGRAM

## 2024-02-08 PROCEDURE — 99223 1ST HOSP IP/OBS HIGH 75: CPT | Performed by: INTERNAL MEDICINE

## 2024-02-08 PROCEDURE — 6370000000 HC RX 637 (ALT 250 FOR IP)

## 2024-02-08 PROCEDURE — 88311 DECALCIFY TISSUE: CPT

## 2024-02-08 PROCEDURE — 6360000002 HC RX W HCPCS: Performed by: ANESTHESIOLOGY

## 2024-02-08 PROCEDURE — 2500000003 HC RX 250 WO HCPCS

## 2024-02-08 PROCEDURE — 1200000000 HC SEMI PRIVATE

## 2024-02-08 PROCEDURE — 88305 TISSUE EXAM BY PATHOLOGIST: CPT

## 2024-02-08 PROCEDURE — 73502 X-RAY EXAM HIP UNI 2-3 VIEWS: CPT

## 2024-02-08 PROCEDURE — 3700000001 HC ADD 15 MINUTES (ANESTHESIA): Performed by: STUDENT IN AN ORGANIZED HEALTH CARE EDUCATION/TRAINING PROGRAM

## 2024-02-08 PROCEDURE — 99222 1ST HOSP IP/OBS MODERATE 55: CPT | Performed by: FAMILY MEDICINE

## 2024-02-08 PROCEDURE — L8690 AUD OSSEO DEV, INT/EXT COMP: HCPCS | Performed by: STUDENT IN AN ORGANIZED HEALTH CARE EDUCATION/TRAINING PROGRAM

## 2024-02-08 PROCEDURE — 3700000000 HC ANESTHESIA ATTENDED CARE: Performed by: STUDENT IN AN ORGANIZED HEALTH CARE EDUCATION/TRAINING PROGRAM

## 2024-02-08 PROCEDURE — 36415 COLL VENOUS BLD VENIPUNCTURE: CPT

## 2024-02-08 PROCEDURE — 7100000001 HC PACU RECOVERY - ADDTL 15 MIN: Performed by: STUDENT IN AN ORGANIZED HEALTH CARE EDUCATION/TRAINING PROGRAM

## 2024-02-08 PROCEDURE — 2500000003 HC RX 250 WO HCPCS: Performed by: STUDENT IN AN ORGANIZED HEALTH CARE EDUCATION/TRAINING PROGRAM

## 2024-02-08 DEVICE — SMARTSET HIGH PERFORMANCE MV MEDIUM VISCOSITY BONE CEMENT 40G
Type: IMPLANTABLE DEVICE | Site: HIP | Status: FUNCTIONAL
Brand: SMARTSET

## 2024-02-08 DEVICE — ARTICUL/EZE FEMORAL HEAD DIAMETER 28MM +1.5 12/14 TAPER
Type: IMPLANTABLE DEVICE | Site: HIP | Status: FUNCTIONAL
Brand: ARTICUL/EZE

## 2024-02-08 DEVICE — CEMENTRALIZER STEM CENTRALIZER 9.25MM CEMENTED
Type: IMPLANTABLE DEVICE | Site: HIP | Status: FUNCTIONAL
Brand: CEMENTRALIZER

## 2024-02-08 DEVICE — SELF CENTERING BI-POLAR HEAD 28MM ID 49MM OD
Type: IMPLANTABLE DEVICE | Site: HIP | Status: FUNCTIONAL
Brand: SELF CENTERING

## 2024-02-08 DEVICE — UNIVERSAL CEMENT RESTRICTOR - DISPOSABLE INSERTER
Type: IMPLANTABLE DEVICE | Site: HIP | Status: FUNCTIONAL
Brand: RESTRICTOR

## 2024-02-08 DEVICE — STEM FEM SZ 4 L114MM NK L34MM 40MM OFFSET 130DEG CALCAR HIP: Type: IMPLANTABLE DEVICE | Site: HIP | Status: FUNCTIONAL

## 2024-02-08 RX ORDER — POLYETHYLENE GLYCOL 3350 17 G/17G
17 POWDER, FOR SOLUTION ORAL DAILY PRN
Status: DISCONTINUED | OUTPATIENT
Start: 2024-02-08 | End: 2024-02-10 | Stop reason: HOSPADM

## 2024-02-08 RX ORDER — DEXAMETHASONE SODIUM PHOSPHATE 4 MG/ML
INJECTION, SOLUTION INTRA-ARTICULAR; INTRALESIONAL; INTRAMUSCULAR; INTRAVENOUS; SOFT TISSUE PRN
Status: DISCONTINUED | OUTPATIENT
Start: 2024-02-08 | End: 2024-02-08 | Stop reason: SDUPTHER

## 2024-02-08 RX ORDER — SODIUM CHLORIDE 9 MG/ML
INJECTION, SOLUTION INTRAVENOUS PRN
Status: DISCONTINUED | OUTPATIENT
Start: 2024-02-08 | End: 2024-02-08 | Stop reason: HOSPADM

## 2024-02-08 RX ORDER — 0.9 % SODIUM CHLORIDE 0.9 %
500 INTRAVENOUS SOLUTION INTRAVENOUS ONCE
Status: COMPLETED | OUTPATIENT
Start: 2024-02-08 | End: 2024-02-08

## 2024-02-08 RX ORDER — ONDANSETRON 4 MG/1
4 TABLET, ORALLY DISINTEGRATING ORAL EVERY 8 HOURS PRN
Status: DISCONTINUED | OUTPATIENT
Start: 2024-02-08 | End: 2024-02-10 | Stop reason: HOSPADM

## 2024-02-08 RX ORDER — ACETAMINOPHEN 325 MG/1
650 TABLET ORAL EVERY 6 HOURS PRN
Status: DISCONTINUED | OUTPATIENT
Start: 2024-02-08 | End: 2024-02-10 | Stop reason: HOSPADM

## 2024-02-08 RX ORDER — DIPHENHYDRAMINE HYDROCHLORIDE 50 MG/ML
12.5 INJECTION INTRAMUSCULAR; INTRAVENOUS
Status: DISCONTINUED | OUTPATIENT
Start: 2024-02-08 | End: 2024-02-08 | Stop reason: HOSPADM

## 2024-02-08 RX ORDER — HYDROCODONE BITARTRATE AND ACETAMINOPHEN 5; 325 MG/1; MG/1
1 TABLET ORAL EVERY 6 HOURS PRN
Status: DISCONTINUED | OUTPATIENT
Start: 2024-02-08 | End: 2024-02-10 | Stop reason: HOSPADM

## 2024-02-08 RX ORDER — FENTANYL CITRATE 50 UG/ML
25 INJECTION, SOLUTION INTRAMUSCULAR; INTRAVENOUS EVERY 5 MIN PRN
Status: DISCONTINUED | OUTPATIENT
Start: 2024-02-08 | End: 2024-02-08 | Stop reason: HOSPADM

## 2024-02-08 RX ORDER — FENTANYL CITRATE 50 UG/ML
INJECTION, SOLUTION INTRAMUSCULAR; INTRAVENOUS PRN
Status: DISCONTINUED | OUTPATIENT
Start: 2024-02-08 | End: 2024-02-08 | Stop reason: SDUPTHER

## 2024-02-08 RX ORDER — PROPOFOL 10 MG/ML
INJECTION, EMULSION INTRAVENOUS PRN
Status: DISCONTINUED | OUTPATIENT
Start: 2024-02-08 | End: 2024-02-08 | Stop reason: SDUPTHER

## 2024-02-08 RX ORDER — ACETAMINOPHEN 500 MG
1000 TABLET ORAL ONCE
Status: DISCONTINUED | OUTPATIENT
Start: 2024-02-08 | End: 2024-02-08 | Stop reason: HOSPADM

## 2024-02-08 RX ORDER — CALCIUM CARBONATE 500(1250)
500 TABLET ORAL DAILY
Status: DISCONTINUED | OUTPATIENT
Start: 2024-02-08 | End: 2024-02-10 | Stop reason: HOSPADM

## 2024-02-08 RX ORDER — BUPIVACAINE HYDROCHLORIDE 5 MG/ML
INJECTION, SOLUTION EPIDURAL; INTRACAUDAL PRN
Status: DISCONTINUED | OUTPATIENT
Start: 2024-02-08 | End: 2024-02-08 | Stop reason: ALTCHOICE

## 2024-02-08 RX ORDER — ESMOLOL HYDROCHLORIDE 10 MG/ML
INJECTION INTRAVENOUS PRN
Status: DISCONTINUED | OUTPATIENT
Start: 2024-02-08 | End: 2024-02-08 | Stop reason: SDUPTHER

## 2024-02-08 RX ORDER — SODIUM CHLORIDE 9 MG/ML
INJECTION, SOLUTION INTRAVENOUS CONTINUOUS PRN
Status: DISCONTINUED | OUTPATIENT
Start: 2024-02-08 | End: 2024-02-08 | Stop reason: SDUPTHER

## 2024-02-08 RX ORDER — KETAMINE HYDROCHLORIDE 10 MG/ML
INJECTION, SOLUTION INTRAMUSCULAR; INTRAVENOUS PRN
Status: DISCONTINUED | OUTPATIENT
Start: 2024-02-08 | End: 2024-02-08 | Stop reason: SDUPTHER

## 2024-02-08 RX ORDER — PROCHLORPERAZINE EDISYLATE 5 MG/ML
5 INJECTION INTRAMUSCULAR; INTRAVENOUS
Status: DISCONTINUED | OUTPATIENT
Start: 2024-02-08 | End: 2024-02-08 | Stop reason: HOSPADM

## 2024-02-08 RX ORDER — SODIUM CHLORIDE 0.9 % (FLUSH) 0.9 %
5-40 SYRINGE (ML) INJECTION PRN
Status: DISCONTINUED | OUTPATIENT
Start: 2024-02-08 | End: 2024-02-08 | Stop reason: HOSPADM

## 2024-02-08 RX ORDER — OXYCODONE HYDROCHLORIDE AND ACETAMINOPHEN 5; 325 MG/1; MG/1
1 TABLET ORAL EVERY 6 HOURS PRN
Qty: 28 TABLET | Refills: 0 | Status: SHIPPED | OUTPATIENT
Start: 2024-02-08 | End: 2024-02-15

## 2024-02-08 RX ORDER — LIDOCAINE HYDROCHLORIDE 20 MG/ML
INJECTION, SOLUTION EPIDURAL; INFILTRATION; INTRACAUDAL; PERINEURAL PRN
Status: DISCONTINUED | OUTPATIENT
Start: 2024-02-08 | End: 2024-02-08 | Stop reason: SDUPTHER

## 2024-02-08 RX ORDER — ACETAMINOPHEN 650 MG/1
650 SUPPOSITORY RECTAL EVERY 6 HOURS PRN
Status: DISCONTINUED | OUTPATIENT
Start: 2024-02-08 | End: 2024-02-10 | Stop reason: HOSPADM

## 2024-02-08 RX ORDER — SODIUM CHLORIDE 0.9 % (FLUSH) 0.9 %
5-40 SYRINGE (ML) INJECTION PRN
Status: DISCONTINUED | OUTPATIENT
Start: 2024-02-08 | End: 2024-02-08 | Stop reason: SDUPTHER

## 2024-02-08 RX ORDER — SODIUM CHLORIDE 9 MG/ML
INJECTION, SOLUTION INTRAVENOUS CONTINUOUS
Status: DISCONTINUED | OUTPATIENT
Start: 2024-02-08 | End: 2024-02-10

## 2024-02-08 RX ORDER — SODIUM CHLORIDE 0.9 % (FLUSH) 0.9 %
5-40 SYRINGE (ML) INJECTION EVERY 12 HOURS SCHEDULED
Status: DISCONTINUED | OUTPATIENT
Start: 2024-02-08 | End: 2024-02-08 | Stop reason: HOSPADM

## 2024-02-08 RX ORDER — VITAMIN C
1 TAB ORAL DAILY
Status: DISCONTINUED | OUTPATIENT
Start: 2024-02-08 | End: 2024-02-10 | Stop reason: HOSPADM

## 2024-02-08 RX ORDER — MIDAZOLAM HYDROCHLORIDE 1 MG/ML
INJECTION INTRAMUSCULAR; INTRAVENOUS PRN
Status: DISCONTINUED | OUTPATIENT
Start: 2024-02-08 | End: 2024-02-08 | Stop reason: SDUPTHER

## 2024-02-08 RX ORDER — LABETALOL HYDROCHLORIDE 5 MG/ML
5 INJECTION, SOLUTION INTRAVENOUS
Status: DISCONTINUED | OUTPATIENT
Start: 2024-02-08 | End: 2024-02-08 | Stop reason: HOSPADM

## 2024-02-08 RX ORDER — SODIUM CHLORIDE 9 MG/ML
INJECTION, SOLUTION INTRAVENOUS PRN
Status: DISCONTINUED | OUTPATIENT
Start: 2024-02-08 | End: 2024-02-08 | Stop reason: SDUPTHER

## 2024-02-08 RX ORDER — METOCLOPRAMIDE HYDROCHLORIDE 5 MG/ML
10 INJECTION INTRAMUSCULAR; INTRAVENOUS ONCE
Status: DISCONTINUED | OUTPATIENT
Start: 2024-02-08 | End: 2024-02-08 | Stop reason: HOSPADM

## 2024-02-08 RX ORDER — HYDROCODONE BITARTRATE AND ACETAMINOPHEN 5; 325 MG/1; MG/1
2 TABLET ORAL EVERY 6 HOURS PRN
Status: DISCONTINUED | OUTPATIENT
Start: 2024-02-08 | End: 2024-02-10 | Stop reason: HOSPADM

## 2024-02-08 RX ORDER — ASPIRIN 81 MG/1
81 TABLET ORAL 2 TIMES DAILY
Qty: 30 TABLET | Refills: 0 | Status: SHIPPED | OUTPATIENT
Start: 2024-02-08

## 2024-02-08 RX ORDER — ONDANSETRON 2 MG/ML
4 INJECTION INTRAMUSCULAR; INTRAVENOUS EVERY 6 HOURS PRN
Status: DISCONTINUED | OUTPATIENT
Start: 2024-02-08 | End: 2024-02-10 | Stop reason: HOSPADM

## 2024-02-08 RX ORDER — ROCURONIUM BROMIDE 10 MG/ML
INJECTION, SOLUTION INTRAVENOUS PRN
Status: DISCONTINUED | OUTPATIENT
Start: 2024-02-08 | End: 2024-02-08 | Stop reason: SDUPTHER

## 2024-02-08 RX ORDER — VANCOMYCIN HYDROCHLORIDE 500 MG/10ML
INJECTION, POWDER, LYOPHILIZED, FOR SOLUTION INTRAVENOUS PRN
Status: DISCONTINUED | OUTPATIENT
Start: 2024-02-08 | End: 2024-02-08 | Stop reason: ALTCHOICE

## 2024-02-08 RX ORDER — HYDRALAZINE HYDROCHLORIDE 20 MG/ML
5 INJECTION INTRAMUSCULAR; INTRAVENOUS
Status: DISCONTINUED | OUTPATIENT
Start: 2024-02-08 | End: 2024-02-08 | Stop reason: HOSPADM

## 2024-02-08 RX ORDER — MEPERIDINE HYDROCHLORIDE 25 MG/ML
12.5 INJECTION INTRAMUSCULAR; INTRAVENOUS; SUBCUTANEOUS ONCE
Status: DISCONTINUED | OUTPATIENT
Start: 2024-02-08 | End: 2024-02-08 | Stop reason: HOSPADM

## 2024-02-08 RX ORDER — SODIUM CHLORIDE 0.9 % (FLUSH) 0.9 %
5-40 SYRINGE (ML) INJECTION EVERY 12 HOURS SCHEDULED
Status: DISCONTINUED | OUTPATIENT
Start: 2024-02-08 | End: 2024-02-08 | Stop reason: SDUPTHER

## 2024-02-08 RX ORDER — ONDANSETRON 2 MG/ML
INJECTION INTRAMUSCULAR; INTRAVENOUS PRN
Status: DISCONTINUED | OUTPATIENT
Start: 2024-02-08 | End: 2024-02-08 | Stop reason: SDUPTHER

## 2024-02-08 RX ADMIN — SODIUM CHLORIDE: 9 INJECTION, SOLUTION INTRAVENOUS at 12:58

## 2024-02-08 RX ADMIN — ESMOLOL HYDROCHLORIDE 30 MG: 10 INJECTION, SOLUTION INTRAVENOUS at 13:12

## 2024-02-08 RX ADMIN — ACETAMINOPHEN 650 MG: 325 TABLET ORAL at 20:58

## 2024-02-08 RX ADMIN — PHENYLEPHRINE HYDROCHLORIDE 100 MCG: 10 INJECTION INTRAVENOUS at 14:05

## 2024-02-08 RX ADMIN — PHENYLEPHRINE HYDROCHLORIDE 100 MCG: 10 INJECTION INTRAVENOUS at 14:28

## 2024-02-08 RX ADMIN — WATER 2000 MG: 1 INJECTION INTRAMUSCULAR; INTRAVENOUS; SUBCUTANEOUS at 13:26

## 2024-02-08 RX ADMIN — ROCURONIUM BROMIDE 40 MG: 10 INJECTION, SOLUTION INTRAVENOUS at 13:12

## 2024-02-08 RX ADMIN — PHENYLEPHRINE HYDROCHLORIDE 100 MCG: 10 INJECTION INTRAVENOUS at 13:43

## 2024-02-08 RX ADMIN — FENTANYL CITRATE 25 MCG: 50 INJECTION, SOLUTION INTRAMUSCULAR; INTRAVENOUS at 15:14

## 2024-02-08 RX ADMIN — LIDOCAINE HYDROCHLORIDE 60 MG: 20 INJECTION, SOLUTION EPIDURAL; INFILTRATION; INTRACAUDAL; PERINEURAL at 13:12

## 2024-02-08 RX ADMIN — HYDROCODONE BITARTRATE AND ACETAMINOPHEN 1 TABLET: 5; 325 TABLET ORAL at 08:37

## 2024-02-08 RX ADMIN — SODIUM CHLORIDE 500 ML: 9 INJECTION, SOLUTION INTRAVENOUS at 16:48

## 2024-02-08 RX ADMIN — DEXAMETHASONE SODIUM PHOSPHATE 10 MG: 4 INJECTION, SOLUTION INTRAMUSCULAR; INTRAVENOUS at 13:38

## 2024-02-08 RX ADMIN — PHENYLEPHRINE HYDROCHLORIDE 200 MCG: 10 INJECTION INTRAVENOUS at 14:43

## 2024-02-08 RX ADMIN — WATER 2000 MG: 1 INJECTION INTRAMUSCULAR; INTRAVENOUS; SUBCUTANEOUS at 21:00

## 2024-02-08 RX ADMIN — SODIUM CHLORIDE: 9 INJECTION, SOLUTION INTRAVENOUS at 02:39

## 2024-02-08 RX ADMIN — TRANEXAMIC ACID 1000 MG: 100 INJECTION, SOLUTION INTRAVENOUS at 13:30

## 2024-02-08 RX ADMIN — FENTANYL CITRATE 25 MCG: 50 INJECTION, SOLUTION INTRAMUSCULAR; INTRAVENOUS at 15:27

## 2024-02-08 RX ADMIN — SODIUM CHLORIDE: 9 INJECTION, SOLUTION INTRAVENOUS at 14:12

## 2024-02-08 RX ADMIN — FENTANYL CITRATE 50 MCG: 50 INJECTION, SOLUTION INTRAMUSCULAR; INTRAVENOUS at 13:36

## 2024-02-08 RX ADMIN — ONDANSETRON 4 MG: 2 INJECTION INTRAMUSCULAR; INTRAVENOUS at 14:34

## 2024-02-08 RX ADMIN — PHENYLEPHRINE HYDROCHLORIDE 100 MCG: 10 INJECTION INTRAVENOUS at 14:33

## 2024-02-08 RX ADMIN — PHENYLEPHRINE HYDROCHLORIDE 100 MCG: 10 INJECTION INTRAVENOUS at 13:58

## 2024-02-08 RX ADMIN — PHENYLEPHRINE HYDROCHLORIDE 100 MCG: 10 INJECTION INTRAVENOUS at 13:15

## 2024-02-08 RX ADMIN — PHENYLEPHRINE HYDROCHLORIDE 200 MCG: 10 INJECTION INTRAVENOUS at 14:19

## 2024-02-08 RX ADMIN — PHENYLEPHRINE HYDROCHLORIDE 100 MCG: 10 INJECTION INTRAVENOUS at 14:11

## 2024-02-08 RX ADMIN — SUGAMMADEX 200 MG: 100 INJECTION, SOLUTION INTRAVENOUS at 15:01

## 2024-02-08 RX ADMIN — PHENYLEPHRINE HYDROCHLORIDE 100 MCG: 10 INJECTION INTRAVENOUS at 13:28

## 2024-02-08 RX ADMIN — SODIUM CHLORIDE: 9 INJECTION, SOLUTION INTRAVENOUS at 19:48

## 2024-02-08 RX ADMIN — FENTANYL CITRATE 25 MCG: 50 INJECTION, SOLUTION INTRAMUSCULAR; INTRAVENOUS at 15:32

## 2024-02-08 RX ADMIN — HYDROCODONE BITARTRATE AND ACETAMINOPHEN 1 TABLET: 5; 325 TABLET ORAL at 01:17

## 2024-02-08 RX ADMIN — MIDAZOLAM 1 MG: 1 INJECTION INTRAMUSCULAR; INTRAVENOUS at 13:06

## 2024-02-08 RX ADMIN — PHENYLEPHRINE HYDROCHLORIDE 100 MCG: 10 INJECTION INTRAVENOUS at 14:12

## 2024-02-08 RX ADMIN — KETAMINE HYDROCHLORIDE 20 MG: 10 INJECTION INTRAMUSCULAR; INTRAVENOUS at 13:12

## 2024-02-08 RX ADMIN — PROPOFOL 70 MG: 10 INJECTION, EMULSION INTRAVENOUS at 13:12

## 2024-02-08 RX ADMIN — FENTANYL CITRATE 25 MCG: 50 INJECTION, SOLUTION INTRAMUSCULAR; INTRAVENOUS at 15:07

## 2024-02-08 ASSESSMENT — PAIN DESCRIPTION - ORIENTATION
ORIENTATION: LEFT

## 2024-02-08 ASSESSMENT — PAIN DESCRIPTION - LOCATION
LOCATION: HIP

## 2024-02-08 ASSESSMENT — ENCOUNTER SYMPTOMS
ABDOMINAL PAIN: 0
DIARRHEA: 0
COUGH: 0
NAUSEA: 0
WHEEZING: 0
SHORTNESS OF BREATH: 0
VOMITING: 0
CONSTIPATION: 0

## 2024-02-08 ASSESSMENT — PAIN SCALES - GENERAL
PAINLEVEL_OUTOF10: 4
PAINLEVEL_OUTOF10: 4
PAINLEVEL_OUTOF10: 0
PAINLEVEL_OUTOF10: 2
PAINLEVEL_OUTOF10: 10
PAINLEVEL_OUTOF10: 10
PAINLEVEL_OUTOF10: 6
PAINLEVEL_OUTOF10: 4

## 2024-02-08 ASSESSMENT — PAIN DESCRIPTION - DESCRIPTORS
DESCRIPTORS: ACHING;DISCOMFORT
DESCRIPTORS: ACHING;SHARP;STABBING
DESCRIPTORS: ACHING;THROBBING;SHARP;STABBING
DESCRIPTORS: ACHING;DISCOMFORT
DESCRIPTORS: ACHING;TENDER;SORE

## 2024-02-08 ASSESSMENT — LIFESTYLE VARIABLES: SMOKING_STATUS: 0

## 2024-02-08 ASSESSMENT — PAIN DESCRIPTION - ONSET: ONSET: ON-GOING

## 2024-02-08 ASSESSMENT — PAIN - FUNCTIONAL ASSESSMENT: PAIN_FUNCTIONAL_ASSESSMENT: PREVENTS OR INTERFERES SOME ACTIVE ACTIVITIES AND ADLS

## 2024-02-08 ASSESSMENT — PAIN DESCRIPTION - PAIN TYPE: TYPE: SURGICAL PAIN

## 2024-02-08 NOTE — ED PROVIDER NOTES
NONFORMULARY NONFORMULARY NONFORMULARY Indications: hawthorn berry, soy lecithin, kelp white oak bark, lira seal root Indications: hawthorn berry, soy lecithin, kelp white oak bark, lira seal root 0 Active  Historical Provider Historical Provider [facility] [place] ([id number])      magnesium oxide (MAG-OX) 400 MG tablet Take 1 tablet by mouth daily      calcium carbonate (OSCAL) 500 MG TABS tablet Take 1 tablet by mouth daily Indications: PT TAKES IT 3 X WEEKLY      Vitamin E 400 units TABS Take 400 Units by mouth 2 times daily       Coenzyme Q10 (CO Q-10) 200 MG CAPS Take 1 capsule by mouth in the morning and 1 capsule in the evening.      vitamin C (ASCORBIC ACID) 500 MG tablet Take 1 tablet by mouth daily      b complex vitamins capsule Take 1 capsule by mouth daily      Cod Liver Oil 1000 MG CAPS Take by mouth      ibuprofen (ADVIL;MOTRIN) 800 MG tablet Take 1 tablet by mouth every 8 hours as needed for Pain Take with food, discontinue if GI upset  Qty: 20 tablet, Refills: 0    Associated Diagnoses: Closed traumatic nondisplaced fracture of distal end of right radius, initial encounter; Scapholunate dissociation of right wrist             ALLERGIES     Patient has no known allergies.    FAMILYHISTORY       Family History   Problem Relation Age of Onset    High Blood Pressure Mother     Heart Disease Father     Heart Disease Brother         cabg    Heart Disease Sister     No Known Problems Brother     Other Sister         Tumor, unsure, possibly uterine .        SOCIAL HISTORY       Social History     Tobacco Use    Smoking status: Never    Smokeless tobacco: Never   Vaping Use    Vaping Use: Never used   Substance Use Topics    Alcohol use: No    Drug use: No       SCREENINGS        Isaura Coma Scale  Eye Opening: Spontaneous  Best Verbal Response: Oriented  Best Motor Response: Obeys commands  Babbitt Coma Scale Score: 15                CIWA Assessment  BP: 124/78  Pulse: 100           PHYSICAL EXAM  1 or  was discussed)  IP CONSULT TO FAMILY MEDICINE  IP CONSULT TO ORTHOPEDIC SURGERY  IP CONSULT TO SOCIAL WORK        I am the Primary Clinician of Record.    FINAL IMPRESSION      1. Fall, initial encounter    2. Closed fracture of neck of left femur, initial encounter ([facility])          DISPOSITION/PLAN     DISPOSITION Admitted 02/07/2024 11:28:38 PM      PATIENT REFERRED TO:  No follow-up provider specified.    DISCHARGE MEDICATIONS:  Current Discharge Medication List          DISCONTINUED MEDICATIONS:  Current Discharge Medication List                 (Please note that portions of this note were completed with a voice recognition program.  Efforts were made to edit the dictations but occasionally words are mis-transcribed.)    Rosendo House DO (electronically signed)            Rosendo House DO  02/08/24 0639

## 2024-02-08 NOTE — PROGRESS NOTES
Paged Dr. South regarding patients strict NPO diet and only oral pain medication via [employer] message. See new orders.

## 2024-02-08 NOTE — ANESTHESIA POSTPROCEDURE EVALUATION
Department of Anesthesiology  Postprocedure Note    Patient: Madiha Watson  MRN: [id number]  YOB: 1950  Date of evaluation: 2/8/2024    Procedure Summary       Date: 02/08/24 Room / Location: [address]    Anesthesia Start: 1258 Anesthesia Stop: 1517    Procedure: LEFT HIP HEMIARTHROPLASTY (Left: Hip) Diagnosis:       Closed left hip fracture (HCC)      (Closed left hip fracture (HCC) [S72.002A])    Surgeons: Brendan King DO Responsible Provider: Amanda Forrest DO    Anesthesia Type: general ASA Status: 3            Anesthesia Type: No value filed.    Raciel Phase I: Raciel Score: 9    Raciel Phase II:      Anesthesia Post Evaluation    Patient location during evaluation: PACU  Patient participation: complete - patient participated  Level of consciousness: awake and alert  Nausea & Vomiting: no vomiting and no nausea  Cardiovascular status: hemodynamically stable  Respiratory status: acceptable and spontaneous ventilation  Hydration status: stable  Comments: Patient off bipap, to low flow NC and tolerating well. Sats mid 90s. No distress or discomfort noted.   Pain management: adequate    No notable events documented.

## 2024-02-08 NOTE — OP NOTE
2/8/2024    Pre-operative diagnosis: Displaced left femoral neck fracture    Post-operative diagnosis: Displaced left femoral neck fracture    Procedure: Left hip hemiarthroplasty    Surgeon: Brendan Kign DO    Assistant:  Richi Churchill RN    Anesthesia:  General    Operative Indication:  73 y.o. female who fell sustaining a displaced left femoral neck fracture.  Patient was admitted for pain control, medical optimization and definitive surgical treatment.  The rationale behind hip hemiarthroplasty as a means of fracture treatment and early mobilization / rehabilitation was explained and informed consent was obtained following a thorough review of risks, benefits, and alternative treatment options.  The typical post-operative recovery process was also outlined.    The risks for surgery include bleeding, infection, damage to blood vessels, damage to nerves, risk of further surgery, chronic pain, restricted range of motion, risk of continued discomfort, risk of malunion, risk of nonunion, risk of need for further reconstructive procedures, risk of need for altered activities and altered gait, risk of blood clots, pulmonary embolism, myocardial infarction, and risk of death were discussed. The patient understood these risks and consented for surgery.    EBL: 75 cc    Complications: None    Components:  1. Depuy summit cemented stem, size 4                           2. Depuy bipolar shell Component, size 49 mm    3.  Head: 28+1.5       Operative Procedure:  Following general anesthesia, the patient was positioned lateral decubitus with the body supported by a peg board.  The affected leg and hip were prepped and draped in the usual standard fashion.  The down leg was padded and protected. The hip and groin were sealed with Ioban.  An intra-operative time out was called to confirm the planned surgical procedure and administration of IV antibiotic.    A skin incision was made centered over the greater trochanter.  A

## 2024-02-08 NOTE — CONSULTS
12:00 PM    LABGLOM >60 02/07/2024 09:00 PM     Magnesium:    Lab Results   Component Value Date/Time    MG 2.1 02/28/2020 05:45 AM     Cardiac Enzymes: No results found for: \"CKTOTAL\", \"CKMB\", \"CKMBINDEX\", \"TROPHS\"   PT/INR:    Lab Results   Component Value Date/Time    PROTIME 12.5 02/07/2024 09:00 PM    INR 1.1 02/07/2024 09:00 PM     TSH:    Lab Results   Component Value Date/Time    TSH 0.51 10/25/2023 10:00 AM     EKG:  Sinus tachycardia, LAFB, LVH, non-specific changes.     Cath Findings 2/28/2020:  Left main: no significant disease  LAD: no significant disease  Circumflex: no significant disease  RCA: Dominant.  No significant disease  LV angio: not performed   Hemodynamics:   Ao: 95/54 (70).   Post op diagnosis: Patent coronary arteries    TAVR 3/4/2020: Successful transcutaneous aortic valve replacement using a 23mm Leong Chantel S3 valve.     Echo Summary 4/13/2021:   Normal left ventricular systolic function.   Ejection fraction is visually estimated at 60%.   Normal right ventricular size and function (TAPSE 2.4 cm).   Indeterminate diastolic function.   History of TAVR (CHANTEL 3) with 23 mm bioprosthetic valve.   No significant paravalvular aortic regurgitation.   AV peak velocity 3.2 m/s.   AV mean gradient 23 mmHg.   Aortic valve area 1.2 cm2.   Mild tricuspid regurgitation.   PASP is estimated at 38 mmHg.    Echo Summary 4/13/2022:   Limited echo ordered for post TAVR follow up.   Tachycardia noted throughout study.    Ejection fraction is visually estimated at 60-65%.   23 mm CHANTEL 3 TAVR valve present, implanted 3/4/20, functioning normally.   Mean gradient 19 mmHg, EOA 1.4 cm2.     ASSESSMENT AND PLAN:  Patient Active Problem List   Diagnosis    Heart murmur    Atrial flutter (HCC)    Critical aortic valve stenosis    Red blood cell antibody positive    Aortic valve replaced    Closed nondisplaced intertrochanteric fracture of left femur with malunion    Closed left hip fracture (HCC)     1.

## 2024-02-08 NOTE — ANESTHESIA PRE PROCEDURE
transfusion     Hypertension     Pleural effusion, bilateral 2020    Varicosities of leg        Past Surgical History:        Procedure Laterality Date    AORTIC VALVE REPLACEMENT N/A 3/4/2020    TRANSCATHETER AORTIC VALVE REPLACEMENT FEMORAL APPROACH performed by Santos Saavedra MD at [facility] [place]     SECTION      HIP FRACTURE SURGERY Right In her 50s. after a fall.    dee Perez    THORACENTESIS Right 2020    1000 cc    THORACENTESIS Left 2020    425 cc    TRANSESOPHAGEAL ECHOCARDIOGRAM N/A 3/4/2020    GERTRUDIS performed by Santos Saavedra MD at [facility] [place]       Social History:    Social History     Tobacco Use    Smoking status: Never    Smokeless tobacco: Never   Substance Use Topics    Alcohol use: No                                Counseling given: Not Answered      Vital Signs (Current):   Vitals:    24 0024 24 0117 24 0147 24 0812   BP: 124/78   123/74   Pulse: 100   92   Resp: 18 16 15 17   Temp: 98.2 °F (36.8 °C)   97.8 °F (36.6 °C)   TempSrc: Oral   Oral   SpO2: 97%   93%   Weight:       Height:                                                  BP Readings from Last 3 Encounters:   24 123/74   10/25/23 100/60   22 90/60       NPO Status:                                                                                 BMI:   Wt Readings from Last 3 Encounters:   24 59 kg (130 lb)   10/25/23 59.9 kg (132 lb)   22 63.5 kg (140 lb)     Body mass index is 21.63 kg/m².    CBC:   Lab Results   Component Value Date/Time    WBC 12.6 2024 06:00 AM    RBC 6.37 2024 06:00 AM    HGB 17.8 2024 06:00 AM    HCT 55.6 2024 06:00 AM    MCV 87.3 2024 06:00 AM    RDW 17.4 2024 06:00 AM     2024 06:00 AM       CMP:   Lab Results   Component Value Date/Time     2024 09:41 AM    K 3.9 2024 09:41 AM     2024 09:41 AM    CO2 22 2024 09:41 AM    BUN 16 2024 09:41 AM    CREATININE

## 2024-02-08 NOTE — PROGRESS NOTES
Call out to Dr. KATARINA Kendall regarding low blood pressure upon return to floor after OR, awaiting return call.     1645: Return call patient will receive a 500 mL normal saline bolus.

## 2024-02-08 NOTE — ED NOTES
ED to Inpatient Handoff Report    Notified Ana Rosa that electronic handoff available and patient ready for transport to room 0739.    Safety Risks: Risk of falls    Patient in Restraints: no    Constant Observer or Patient : no    Telemetry Monitoring Ordered :No           Order to transfer to unit without monitor:N/A    Last MEWS: 2 Time completed: 2344    Deterioration Index Score:   Predictive Model Details          24 (Normal)  Factor Value    Calculated 2/7/2024 23:50 55% Age 73 years old    Deterioration Index Model 18% Systolic 98     16% WBC count abnormal (15.7 k/uL)     6% Pulse 96     3% Sodium 135 mmol/L     2% Hematocrit abnormal (60.5 %)     0% Temperature 98 °F (36.7 °C)     0% Pulse oximetry 95 %     0% Respiratory rate 16     0% Potassium 3.9 mmol/L        Vitals:    02/07/24 1942 02/07/24 2206 02/07/24 2322 02/07/24 2344   BP: (!) 127/97 94/73 (!) 84/67 98/80   Pulse: (!) 109 100 92 96   Resp: 16 18 16 16   Temp: 98 °F (36.7 °C)   98 °F (36.7 °C)   SpO2: 94% 94% 95% 95%   Weight: 59 kg (130 lb)      Height: 1.651 m (5' 5\")            Opportunity for questions and clarification was provided.

## 2024-02-08 NOTE — PROGRESS NOTES
[facility]  Progress Note    Chief complaint :  Chief Complaint   Patient presents with    Fall     Pt fell at home around noon. Left leg pain. Not able to stand.       Subjective:    Yesterday patient got left hemiarthroplasty with orthopedics.  Following the surgery patient was mildly hypotensive in the PACU, was transferred to the floors, and was noted to be hypotensive to 76/65.  She received 500 cc bolus.  Her blood pressure improved but was still low at 92/42 and so another 500 cc bolus was given.  Vitals have been stable since then.  Patient describes feeling better. Patient denies chest pain, SOB, nausea, vomiting, fever, chills, changes in urination, changes in BM. Patient is tolerating diet.    Mrs. Watson was seen this morning awake in bed with family at bedside.  She states she is doing well following the surgery and pain is well-controlled.  She does have a canker sore in her inner lip and requested medication for it so Orajel was prescribed.  Patient states she has not had a bowel movement following surgery however has been passing flatus.    Past medical, surgical, family and social history were reviewed, non-contributory, and unchanged unless otherwise stated.    Review of Systems   Constitutional:  Negative for activity change, appetite change, chills and fever.   HENT:  Positive for mouth sores (Single canker sore in her lip).    Respiratory:  Negative for cough, shortness of breath and wheezing.    Cardiovascular:  Negative for chest pain, palpitations and leg swelling.   Gastrointestinal:  Negative for abdominal pain, constipation, diarrhea, nausea and vomiting.   Genitourinary:  Negative for difficulty urinating and dysuria.   Musculoskeletal:         Left knee and thigh pain    Neurological:  Negative for dizziness and headaches.   Psychiatric/Behavioral:          Slurred speech, tremor        Objective:  /74   Pulse 88   Temp 97.3 °F (36.3 °C)

## 2024-02-08 NOTE — PROGRESS NOTES
Nurse to nurse called to  AMAN Alvarenga on [address].  Family notified to proceed to patient's room.

## 2024-02-08 NOTE — CONSULTS
Orthopaedic Consultation  Brendan King DO      CHIEF COMPLAINT:  Left hip pain    History of Present Illness:  Patient is a 73 y.o. year-old female who presented to the ER with left hip pain.   She had a FFSH.  She had immediate left hip pain and was unable to ambulate.  She denies LOC.  Currently the pain is 8/10.  Pain is worse with any movement of the left hip and improves with rest.  The pain is localized to the groin and lateral hip.  Prior to the fall she ambulated without assistance.  She lives at home. She is not on any anticoagulant.      Past Medical History:   Diagnosis Date    Aortic stenosis, severe     Arthritis     Heart murmur     Heart murmur     Since birth    HFrEF (heart failure with reduced ejection fraction) ([facility]) 2020- echo- LVEF 35-40%, stage II DD, moderately enlarged LA, mild MR, mild TR, critical AS, LVDD: 5.0, RVDD: 2.4    History of blood transfusion     Hypertension     Pleural effusion, bilateral 2020    Varicosities of leg          Past Surgical History:   Procedure Laterality Date    AORTIC VALVE REPLACEMENT N/A 3/4/2020    TRANSCATHETER AORTIC VALVE REPLACEMENT FEMORAL APPROACH performed by Santos Saavedra MD at [facility] [place]     SECTION      HIP FRACTURE SURGERY Right In her 50s. after a fall.    Dr. Yeboah, [place]    THORACENTESIS Right 2020    1000 cc    THORACENTESIS Left 2020    425 cc    TRANSESOPHAGEAL ECHOCARDIOGRAM N/A 3/4/2020    GERTRUDIS performed by Santos Saavedra MD at [facility] [place]       Medications Prior to Admission:    Medications Prior to Admission: NONFORMULARY, NONFORMULARY NONFORMULARY Indications: hawthorn berry, soy lecithin, kelp white oak bark, lira seal root Indications: hawthorn berry, soy lecithin, kelp white oak bark, lira seal root 0 Active  Historical Provider Historical Provider [facility]- [place], [place] ([id number])  magnesium oxide (MAG-OX) 400 MG tablet, Take 1 tablet by mouth daily  calcium carbonate (OSCAL) 500 MG TABS tablet,  Take 1 tablet by mouth daily Indications: PT TAKES IT 3 X WEEKLY  Vitamin E 400 units TABS, Take 400 Units by mouth 2 times daily   Coenzyme Q10 (CO Q-10) 200 MG CAPS, Take 1 capsule by mouth in the morning and 1 capsule in the evening.  vitamin C (ASCORBIC ACID) 500 MG tablet, Take 1 tablet by mouth daily  b complex vitamins capsule, Take 1 capsule by mouth daily  Cod Liver Oil 1000 MG CAPS, Take by mouth  ibuprofen (ADVIL;MOTRIN) 800 MG tablet, Take 1 tablet by mouth every 8 hours as needed for Pain Take with food, discontinue if GI upset    Allergies:    Patient has no known allergies.    Social History:    reports that she has never smoked. She has never used smokeless tobacco. She reports that she does not drink alcohol and does not use drugs.    Family History:   family history includes Heart Disease in her brother, father, and sister; High Blood Pressure in her mother; No Known Problems in her brother; Other in her sister.    REVIEW OF SYSTEMS:  As above in the HPI, otherwise negative    PHYSICAL EXAM:    Vitals:  /78   Pulse 100   Temp 98.2 °F (36.8 °C) (Oral)   Resp 15   Ht 1.651 m (5' 5\")   Wt 59 kg (130 lb)   SpO2 97%   BMI 21.63 kg/m²     General: Well nourished, well developed, pleasant, alert and oriented x 3  HEENT: Normocephalic, atraumatic. EOM intact, sclera clear   Neck: Supple  Cardiovascular: No apparent abnormalities, no lower leg edema, no varicosities, pedal pulses are palpable  Lungs: Breathing not labored, no acute distress  Abdomen: Soft, nontender  Skin: Warm and dry, no open lesions or rash  Neuro: Sensation intact to light touch in bilateral upper and lower extremities     Left lower extremity:  Left leg shortened and externally rotated  Skin intact  Pain with any movement  No tenderness to palpation left knee or ankle  SILT L1-S1  TA/EHL/GS intact  Palpable DP, W/WP     Right lower extremity  No tenderness to palpation, full passive ROM, SILT     Bilateral upper

## 2024-02-08 NOTE — PROGRESS NOTES
[facility]  Progress Note    Chief complaint :  Chief Complaint   Patient presents with    Fall     Pt fell at home around noon. Left leg pain. Not able to stand.       Subjective:    No overnight problems. Patient describes feeling unchanged. Patient denies chest pain, SOB, nausea, vomiting, fever, chills, changes in urination, changes in BM. Patient is NPO.    As per nurse, no overnight issues, still awaiting recommendations from orthopedics.    Mrs. Watson was seen laying awake in bed this morning with family at bedside.  She states that she is feeling fine overall aside from left leg pain.  She joked and said she wanted to go home but understands that she needs to stay for orthopedic evaluation.  Of note patient is undergoing neurological workup at [facility] for a currently unknown progressive neurological disorder resembling akinesia/dyskinesia along with slightly slurred speech and tremor.  MRIs have been unremarkable.    Past medical, surgical, family and social history were reviewed, non-contributory, and unchanged unless otherwise stated.    Review of Systems   Constitutional:  Negative for activity change, appetite change, chills and fever.   Respiratory:  Negative for cough, shortness of breath and wheezing.    Cardiovascular:  Negative for chest pain, palpitations and leg swelling.   Gastrointestinal:  Negative for abdominal pain, constipation, diarrhea, nausea and vomiting.   Genitourinary:  Negative for difficulty urinating and dysuria.   Musculoskeletal:         Left knee and thigh pain    Neurological:  Negative for dizziness and headaches.   Psychiatric/Behavioral:          Slurred speech, tremor        Objective:  /78   Pulse 100   Temp 98.2 °F (36.8 °C) (Oral)   Resp 15   Ht 1.651 m (5' 5\")   Wt 59 kg (130 lb)   SpO2 97%   BMI 21.63 kg/m²     Physical Exam  Constitutional:       Appearance: Normal appearance. She is normal weight.   HENT:

## 2024-02-08 NOTE — DISCHARGE INSTRUCTIONS
Orthopedic Instructions:  Weight bearing status: Weight bearing as tolerated for the left leg  Keep dressing clean and dry.  Starting 5 days after surgery, Ok for daily dressing changes until wound is dry. Then leave open to air.  Starting 3 days after surgery, if wound is no longer leaking, Ok to shower but no soaks in a bath, hot tub, pool, etc.  Physical Therapy for strengthening, range of motion, and gait training.  Ice (20 minutes on and off 1 hour) and elevate above the level of the heart to reduce swelling and throbbing pain.  Drink plenty of fluids.  Should urinate within 8 hours of surgery.  Call the office or come to Emergency Room if signs of infection appear (hot, swollen, red, draining pus, fever)  Take medications as prescribed.  Wean off narcotics (percocet/norco) as soon as possible. Do not take tylenol if still taking narcotics.  Follow up with Dr. King in his office 10-14 days after surgery. Call [phone] to schedule/confirm.

## 2024-02-08 NOTE — H&P
[facility]  Resident History and Physical      Chief Complaint:    Chief Complaint   Patient presents with    Fall     Pt fell at home around noon. Left leg pain. Not able to stand.        History of Present Illness:   Madiha Watson  is a 73 y.o. female patient of Clemente Arboleda MD  with a pertinent PMHx of atrial flutter who presented to the ED from home with chief complaint of leg pain after a fall. This morning she fell while tripping over her shoes. She denies any dizziness, lightheadedness or chest pain at the time of the fall. Of note patient is currently being worked up for a progressive neurologic issue by neurologist in [place]. Issue started 4 years ago with involuntary jerking of right foot. Since then it has progressed up the right side of her body. Over the past several months it has started to affect her tongue and speech. She has an appointment with her neurologist next week. Per family, MRI brain done last week in [place] was normal.       In the ED vitals notable for heart rate of 100. Labs are notable for WBC 15.7and Hgb 19.3. Xray of femur shows Acute displaced subcapital fracture proximal left femur. Ortho was consulted in ED who agreed to evaluate her in the morning. She was admitted for femur fracture.     Past Medical History:   Diagnosis Date    Aortic stenosis, severe     Arthritis     Heart murmur     Heart murmur     Since birth    HFrEF (heart failure with reduced ejection fraction) ([facility]) 2020- echo- LVEF 35-40%, stage II DD, moderately enlarged LA, mild MR, mild TR, critical AS, LVDD: 5.0, RVDD: 2.4    History of blood transfusion     Hypertension     Pleural effusion, bilateral 2020    Varicosities of leg          Past Surgical History:   Procedure Laterality Date    AORTIC VALVE REPLACEMENT N/A 3/4/2020    TRANSCATHETER AORTIC VALVE REPLACEMENT FEMORAL APPROACH performed by Santos Saavedra MD at [facility] OR      GALINA, Polyspecific NEGATIVE     Blood Bank Comment       Delay for compatible blood.This patient is currently demonstrating or has a history of irregular antibodies. Allow extra time for crossmatch compatible blood.  CALLED TO: LEEANN ROYAL RN ER 2/7/2024 2218         XR HIP 2-3 VW W PELVIS LEFT   Final Result   Acute displaced subcapital fracture proximal left femur.         XR FEMUR LEFT (MIN 2 VIEWS)   Final Result   Acute displaced subcapital fracture proximal left femur.         XR KNEE LEFT (3 VIEWS)   Final Result   Acute displaced subcapital fracture proximal left femur.             Assessment/Plan:      Active Hospital Problems    Diagnosis Date Noted    Closed nondisplaced intertrochanteric fracture of left femur with malunion [S72.145P] 02/07/2024     Left femoral neck fracture  Norco 5 or 10 Q6H   PT/OT/SW  NPO for possibel procedure   Ortho consulted appreciate recs    Leukocytosis/Elevated hemoglobin  Likely secondary to dehydration. Received 1l bolus in ED  75 cc/hr  CBC/CMP daily     DVT ppx: scd  GI ppx: none  Code Status: full         Case discussed with attending on call Dr. Hawkins.    Graham South MD   Family Medicine Resident PGY-2  2/7/2024

## 2024-02-08 NOTE — PROGRESS NOTES
P Quality Flow/Interdisciplinary Rounds Progress Note        Quality Flow Rounds held on February 8, 2024    Disciplines Attending:  Bedside Nurse, , , and Nursing Unit Leadership    Madiha Watson was admitted on 2/7/2024  7:32 PM    Anticipated Discharge Date:       Disposition:    Zackery Score:  Zackery Scale Score: 15    Readmission Risk              Risk of Unplanned Readmission:  0           Discussed patient goal for the day, patient clinical progression, and barriers to discharge.  The following Goal(s) of the Day/Commitment(s) have been identified:   Discharge planning      Silvia Encarnacion RN  February 8, 2024

## 2024-02-09 PROBLEM — R47.1 DYSARTHRIA: Status: ACTIVE | Noted: 2024-02-09

## 2024-02-09 LAB
ABO + RH BLD: NORMAL
ALBUMIN SERPL-MCNC: 2.9 G/DL (ref 3.5–5.2)
ALP SERPL-CCNC: 91 U/L (ref 35–104)
ALT SERPL-CCNC: 25 U/L (ref 0–32)
ANION GAP SERPL CALCULATED.3IONS-SCNC: 11 MMOL/L (ref 7–16)
ANTIBODY IDENTIFICATION: NORMAL
ANTIGEN TYPE, PATIENT: NORMAL
ARM BAND NUMBER: NORMAL
AST SERPL-CCNC: 30 U/L (ref 0–31)
BASOPHILS # BLD: 0.04 K/UL (ref 0–0.2)
BASOPHILS NFR BLD: 0 % (ref 0–2)
BILIRUB SERPL-MCNC: 0.6 MG/DL (ref 0–1.2)
BLOOD BANK COMMENT: NORMAL
BLOOD BANK SAMPLE EXPIRATION: NORMAL
BLOOD GROUP ANTIBODIES SERPL: POSITIVE
BUN SERPL-MCNC: 12 MG/DL (ref 6–23)
CALCIUM SERPL-MCNC: 7.6 MG/DL (ref 8.6–10.2)
CHLORIDE SERPL-SCNC: 106 MMOL/L (ref 98–107)
CO2 SERPL-SCNC: 21 MMOL/L (ref 22–29)
CREAT SERPL-MCNC: 0.5 MG/DL (ref 0.5–1)
DAT POLY-SP REAG RBC QL: NEGATIVE
EOSINOPHIL # BLD: 0.01 K/UL (ref 0.05–0.5)
EOSINOPHILS RELATIVE PERCENT: 0 % (ref 0–6)
ERYTHROCYTE [DISTWIDTH] IN BLOOD BY AUTOMATED COUNT: 16.8 % (ref 11.5–15)
GFR SERPL CREATININE-BSD FRML MDRD: >60 ML/MIN/1.73M2
GLUCOSE SERPL-MCNC: 130 MG/DL (ref 74–99)
HCT VFR BLD AUTO: 48.9 % (ref 34–48)
HGB BLD-MCNC: 15.5 G/DL (ref 11.5–15.5)
IMM GRANULOCYTES # BLD AUTO: 0.09 K/UL (ref 0–0.58)
IMM GRANULOCYTES NFR BLD: 1 % (ref 0–5)
LYMPHOCYTES NFR BLD: 0.95 K/UL (ref 1.5–4)
LYMPHOCYTES RELATIVE PERCENT: 6 % (ref 20–42)
MAGNESIUM SERPL-MCNC: 1.8 MG/DL (ref 1.6–2.6)
MCH RBC QN AUTO: 27.5 PG (ref 26–35)
MCHC RBC AUTO-ENTMCNC: 31.7 G/DL (ref 32–34.5)
MCV RBC AUTO: 86.7 FL (ref 80–99.9)
MICROORGANISM SPEC CULT: NORMAL
MONOCYTES NFR BLD: 1.08 K/UL (ref 0.1–0.95)
MONOCYTES NFR BLD: 7 % (ref 2–12)
NEUTROPHILS NFR BLD: 86 % (ref 43–80)
NEUTS SEG NFR BLD: 13.06 K/UL (ref 1.8–7.3)
PLATELET # BLD AUTO: 204 K/UL (ref 130–450)
PMV BLD AUTO: 9.8 FL (ref 7–12)
POTASSIUM SERPL-SCNC: 3.5 MMOL/L (ref 3.5–5)
PROT SERPL-MCNC: 5.2 G/DL (ref 6.4–8.3)
RBC # BLD AUTO: 5.64 M/UL (ref 3.5–5.5)
SODIUM SERPL-SCNC: 138 MMOL/L (ref 132–146)
SPECIMEN DESCRIPTION: NORMAL
WBC OTHER # BLD: 15.2 K/UL (ref 4.5–11.5)

## 2024-02-09 PROCEDURE — 80053 COMPREHEN METABOLIC PANEL: CPT

## 2024-02-09 PROCEDURE — 99232 SBSQ HOSP IP/OBS MODERATE 35: CPT | Performed by: FAMILY MEDICINE

## 2024-02-09 PROCEDURE — 97165 OT EVAL LOW COMPLEX 30 MIN: CPT

## 2024-02-09 PROCEDURE — 97110 THERAPEUTIC EXERCISES: CPT

## 2024-02-09 PROCEDURE — 1200000000 HC SEMI PRIVATE

## 2024-02-09 PROCEDURE — 2580000003 HC RX 258: Performed by: STUDENT IN AN ORGANIZED HEALTH CARE EDUCATION/TRAINING PROGRAM

## 2024-02-09 PROCEDURE — 99233 SBSQ HOSP IP/OBS HIGH 50: CPT | Performed by: INTERNAL MEDICINE

## 2024-02-09 PROCEDURE — 85025 COMPLETE CBC W/AUTO DIFF WBC: CPT

## 2024-02-09 PROCEDURE — 97530 THERAPEUTIC ACTIVITIES: CPT

## 2024-02-09 PROCEDURE — 97535 SELF CARE MNGMENT TRAINING: CPT

## 2024-02-09 PROCEDURE — 92526 ORAL FUNCTION THERAPY: CPT | Performed by: SPEECH-LANGUAGE PATHOLOGIST

## 2024-02-09 PROCEDURE — 6370000000 HC RX 637 (ALT 250 FOR IP)

## 2024-02-09 PROCEDURE — 6360000002 HC RX W HCPCS: Performed by: STUDENT IN AN ORGANIZED HEALTH CARE EDUCATION/TRAINING PROGRAM

## 2024-02-09 PROCEDURE — 97161 PT EVAL LOW COMPLEX 20 MIN: CPT

## 2024-02-09 PROCEDURE — 92610 EVALUATE SWALLOWING FUNCTION: CPT | Performed by: SPEECH-LANGUAGE PATHOLOGIST

## 2024-02-09 PROCEDURE — 6370000000 HC RX 637 (ALT 250 FOR IP): Performed by: STUDENT IN AN ORGANIZED HEALTH CARE EDUCATION/TRAINING PROGRAM

## 2024-02-09 PROCEDURE — 2580000003 HC RX 258

## 2024-02-09 PROCEDURE — 6360000002 HC RX W HCPCS

## 2024-02-09 PROCEDURE — 97116 GAIT TRAINING THERAPY: CPT

## 2024-02-09 PROCEDURE — 83735 ASSAY OF MAGNESIUM: CPT

## 2024-02-09 RX ORDER — ASPIRIN 81 MG/1
81 TABLET, CHEWABLE ORAL 2 TIMES DAILY
Status: DISCONTINUED | OUTPATIENT
Start: 2024-02-09 | End: 2024-02-10 | Stop reason: HOSPADM

## 2024-02-09 RX ORDER — MAGNESIUM SULFATE IN WATER 40 MG/ML
2000 INJECTION, SOLUTION INTRAVENOUS ONCE
Status: COMPLETED | OUTPATIENT
Start: 2024-02-09 | End: 2024-02-09

## 2024-02-09 RX ORDER — 0.9 % SODIUM CHLORIDE 0.9 %
500 INTRAVENOUS SOLUTION INTRAVENOUS ONCE
Status: COMPLETED | OUTPATIENT
Start: 2024-02-09 | End: 2024-02-09

## 2024-02-09 RX ADMIN — Medication: at 12:44

## 2024-02-09 RX ADMIN — Medication 1 TABLET: at 09:19

## 2024-02-09 RX ADMIN — ASPIRIN 81 MG CHEWABLE TABLET 81 MG: 81 TABLET CHEWABLE at 09:19

## 2024-02-09 RX ADMIN — MAGNESIUM SULFATE HEPTAHYDRATE 2000 MG: 40 INJECTION, SOLUTION INTRAVENOUS at 06:28

## 2024-02-09 RX ADMIN — SODIUM CHLORIDE 500 ML: 9 INJECTION, SOLUTION INTRAVENOUS at 00:42

## 2024-02-09 RX ADMIN — WATER 2000 MG: 1 INJECTION INTRAMUSCULAR; INTRAVENOUS; SUBCUTANEOUS at 06:03

## 2024-02-09 RX ADMIN — ASPIRIN 81 MG CHEWABLE TABLET 81 MG: 81 TABLET CHEWABLE at 21:11

## 2024-02-09 RX ADMIN — ACETAMINOPHEN 650 MG: 325 TABLET ORAL at 04:10

## 2024-02-09 RX ADMIN — SODIUM CHLORIDE: 9 INJECTION, SOLUTION INTRAVENOUS at 21:12

## 2024-02-09 RX ADMIN — SODIUM CHLORIDE: 9 INJECTION, SOLUTION INTRAVENOUS at 00:22

## 2024-02-09 RX ADMIN — CALCIUM 500 MG: 500 TABLET ORAL at 09:19

## 2024-02-09 ASSESSMENT — ENCOUNTER SYMPTOMS
NAUSEA: 0
COUGH: 0
DIARRHEA: 0
CONSTIPATION: 0
VOMITING: 0
WHEEZING: 0
ABDOMINAL PAIN: 0
SHORTNESS OF BREATH: 0

## 2024-02-09 ASSESSMENT — PAIN DESCRIPTION - DESCRIPTORS: DESCRIPTORS: ACHING;SHARP;SORE

## 2024-02-09 ASSESSMENT — PAIN DESCRIPTION - ORIENTATION: ORIENTATION: LEFT

## 2024-02-09 ASSESSMENT — PAIN SCALES - GENERAL
PAINLEVEL_OUTOF10: 0
PAINLEVEL_OUTOF10: 0
PAINLEVEL_OUTOF10: 5

## 2024-02-09 ASSESSMENT — PAIN - FUNCTIONAL ASSESSMENT: PAIN_FUNCTIONAL_ASSESSMENT: PREVENTS OR INTERFERES SOME ACTIVE ACTIVITIES AND ADLS

## 2024-02-09 ASSESSMENT — PAIN DESCRIPTION - LOCATION: LOCATION: HIP

## 2024-02-09 NOTE — PROGRESS NOTES
P Quality Flow/Interdisciplinary Rounds Progress Note        Quality Flow Rounds held on February 9, 2024    Disciplines Attending:  Bedside Nurse, , , and Nursing Unit Leadership    Madiha Watson was admitted on 2/7/2024  7:32 PM    Anticipated Discharge Date:       Disposition:    Zackery Score:  Zackery Scale Score: 15    Readmission Risk              Risk of Unplanned Readmission:  7           Discussed patient goal for the day, patient clinical progression, and barriers to discharge.  The following Goal(s) of the Day/Commitment(s) have been identified:   Discharge Planning      Terri Montanez RN  February 9, 2024

## 2024-02-09 NOTE — PROGRESS NOTES
CHIEF COMPLAINT: Pre-op Clearance/Hip Fracture/Hx of TAVR    HISTORY OF PRESENT ILLNESS: Patient is a 73 y.o. female seen at the request of Clemente Arboleda MD and followed at our office by Dr Jimenez in past.     Patient presented post mechanical fall and diagnosed with hip fracture.     Cardiology consulted for pre-op clearance.     No CP or SOB.    Medical History:  History of heart murmur.  Varicosities of leg.  No history of MI, CHF, CVA, diabetes, CKD  Echo 2017: Aortic valve this 68-year-old woman is seen for outpatient cardiac calcified and restricted.  AV peak velocity 5.23M/S.  AV mean gradient 69.7.  AV area 0.56 cm 2.  Dimensionless index 0.17.  Normal EF.  Left atrium dilated.  Patient declined further cardiac evaluation.  Surgical history:  and right hip fracture  presented to [facility] on 2020 for further evaluation of progressive dyspnea on exertion (now occurring with mild levels of exertion over the past 2-3 weeks), LE edema, and orthopnea  proBNP 76237 proBNP 90708  Echo 2020: EF 35-40%.  LVH.  Left atrium moderately dilated.  Aortic valve calcified with peak velocity 5.4M/S, mean gradient 75 mmHg and aortic valve area 0.5 cm 2.  Coronary arteriogram 2020.  No hemodynamically significant disease  S/p TAVR 2020: 23mm Leong Chantel S3 valve.   Echo 2020: Normal EF.  No LV dilatation.  Left atrium moderately dilated.  Mild MR.  TAVR.  #23.  Mean gradient 21mmHg.  RVSP 51 mmHg.    Past Medical History:   Diagnosis Date    Aortic stenosis, severe     Arthritis     Heart murmur     Heart murmur     Since birth    HFrEF (heart failure with reduced ejection fraction) ([facility]) 2020- echo- LVEF 35-40%, stage II DD, moderately enlarged LA, mild MR, mild TR, critical AS, LVDD: 5.0, RVDD: 2.4    History of blood transfusion     Hypertension     Pleural effusion, bilateral 2020    Varicosities of leg        Patient Active Problem List  02/09/2024 04:00 AM    CALCIUM 7.6 02/09/2024 04:00 AM    GFRAA >60 06/02/2020 12:00 PM    LABGLOM >60 02/09/2024 04:00 AM     Magnesium:    Lab Results   Component Value Date/Time    MG 1.8 02/09/2024 04:00 AM     Cardiac Enzymes: No results found for: \"CKTOTAL\", \"CKMB\", \"CKMBINDEX\", \"TROPHS\"   PT/INR:    Lab Results   Component Value Date/Time    PROTIME 12.5 02/07/2024 09:00 PM    INR 1.1 02/07/2024 09:00 PM     TSH:    Lab Results   Component Value Date/Time    TSH 0.51 10/25/2023 10:00 AM     EKG:  Sinus tachycardia, LAFB, LVH, non-specific changes.     Cath Findings 2/28/2020:  Left main: no significant disease  LAD: no significant disease  Circumflex: no significant disease  RCA: Dominant.  No significant disease  LV angio: not performed   Hemodynamics:   Ao: 95/54 (70).   Post op diagnosis: Patent coronary arteries    TAVR 3/4/2020: Successful transcutaneous aortic valve replacement using a 23mm Leong Chantel S3 valve.     Echo Summary 4/13/2021:   Normal left ventricular systolic function.   Ejection fraction is visually estimated at 60%.   Normal right ventricular size and function (TAPSE 2.4 cm).   Indeterminate diastolic function.   History of TAVR (CHANTEL 3) with 23 mm bioprosthetic valve.   No significant paravalvular aortic regurgitation.   AV peak velocity 3.2 m/s.   AV mean gradient 23 mmHg.   Aortic valve area 1.2 cm2.   Mild tricuspid regurgitation.   PASP is estimated at 38 mmHg.    Echo Summary 4/13/2022:   Limited echo ordered for post TAVR follow up.   Tachycardia noted throughout study.    Ejection fraction is visually estimated at 60-65%.   23 mm CHANTEL 3 TAVR valve present, implanted 3/4/20, functioning normally.   Mean gradient 19 mmHg, EOA 1.4 cm2.     ASSESSMENT AND PLAN:  Patient Active Problem List   Diagnosis    Heart murmur    Atrial flutter (HCC)    Critical aortic valve stenosis    Red blood cell antibody positive    Aortic valve replaced    Closed nondisplaced intertrochanteric

## 2024-02-09 NOTE — PROGRESS NOTES
Patient awake repositioned in bed side to side moving well in bed. Pt. Refuse to get out of bed at this time. Encouraged cough deep breathing . Family at bedside.

## 2024-02-09 NOTE — PROGRESS NOTES
Patient awaken dangled and stood at bedside with walker tolerated well.  Repositioned in bed no c/o voiced. Family at bedside.

## 2024-02-09 NOTE — DISCHARGE SUMMARY
Discharge Summary    Date:2/10/2024  Patient Name: Madiha Watson     YOB: 1950     Age: 73 y.o. MRN: [id number]    Admit Date:2/7/2024    Discharge Date: 02/10/24  Discharged Condition: stable    Discharge Diagnoses   Principal Problem:    Closed nondisplaced intertrochanteric fracture of left femur with malunion  Active Problems:    Aortic valve replaced    Neurological movement disorder    Dysarthria  Resolved Problems:    Fall    Abnormal EKG    Closed fracture of neck of left femur (HCC)      Hospital Stay   Narrative of Hospital Course:  Madiha Watson was admitted on 2/7/2024 with a pertinent PMHx of atrial flutter TAVR and an unknown movement disorder who presented to the ED from home with chief complaint of leg pain after a fall.  Xray of the left femur shows acute displaced subcapital fracture proximal left femur. Ortho was consulted from the ED. She was given pain medication. Cardiology was consulted for cardaic clearance; they deemed her an acceptable risk for surgery. She underwent left hip hemiarthroplasty on 2/8 without complication. Speech was consulted given her history of her movement disorder and difficulty swallowing. They advised a diet of easy to chew solids and think liquids. She was discharged in stable condition with home health care and told to follow up with PCP neurologist and orthopedics.  She is to take aspirin 81 mg twice daily for the next 4 weeks per orthopedics.  It was also recommended that patient follow-up with speech therapist as an outpatient.    Consultants:   IP CONSULT TO FAMILY MEDICINE  IP CONSULT TO ORTHOPEDIC SURGERY  IP CONSULT TO SOCIAL WORK  IP CONSULT TO CARDIOLOGY  IP CONSULT TO HOME CARE NEEDS  IP CONSULT TO DIETITIAN    Surgeries/Procedures Performed:  Procedure(s):  LEFT HIP HEMIARTHROPLASTY     Treatments:   IV hydration, analgesia: Norco, and surgery: Left hip hemiarthroplasty    Significant Diagnostic Studies:   Recent Labs:  CBC:   Lab Results

## 2024-02-09 NOTE — PROGRESS NOTES
Physical Therapy  Facility/Department: [address] MED SURG  Physical Therapy Initial Assessment    Name: Madiha Watson  : 1950  MRN: [id number]  Date of Service: 2024    Attending Provider:  Marcie Gongora MD    Evaluating PT:  Dg Demarco Jr., P.T.    Room #:  [facility]  Diagnosis:  Closed nondisplaced intertrochanteric fracture of left femur with malunion [S72.145P]  Pertinent PMHx/PSHx:  Pt and  report she has been seeing a neurologist for evaluation of recent dyskinesia and impaired and slow speech that has come on for no apparent reason.  They report MRI of brain was negative for CVA or tumor and had blood test performed and are awaiting results.   Procedure/Surgery:  24 L hip hemiarthroplasty  Precautions:  WBAT LLE, L hip posterolateral precautions, falls, bed/chair alarm if alone.    SUBJECTIVE:    Pt lives with her  and son in a 2 story home with 4 stairs and 2 rails to enter.  She has first floor bed and bath.  Pt ambulated with no AD PTA.  She has a cane and ww.  Recently she has been having neurological symptoms of dyskinesia and impaired speech and has been following with a neurologist PTA.     OBJECTIVE:   Initial Evaluation  Date: 24 Treatment Short Term/ Long Term   Goals   Was pt agreeable to Eval/treatment? yes     Does pt have pain? No c/o pain at rest, mild L hip pain with movement     Bed Mobility  Rolling: MIN A  Supine to sit: MOD A  Sit to supine: NA  Scooting: MIN A  supervision   Transfers Sit to stand: MIN A  Stand to sit: MIN A  Stand pivot: MIN A/MOD A  SBA   Ambulation   15 feet with ww MIN A/MOD A  150 feet with ww CGA   Stair negotiation: ascended and descended NA, pt fatigued with amb  4 steps with 1-2 rails CGA   AM-PAC 6 Clicks        BLE ROM is WFL, and L hip is WFL allowed by hip precautions.   RLE strength is grossly 4/5 to 4+/5 and LLE is grossly 2/5 to 4-/5.   Sensation:  Pt denies numbness and tingling to

## 2024-02-09 NOTE — PROGRESS NOTES
Occupational Therapy    OCCUPATIONAL THERAPY INITIAL EVALUATION    [facility]   [address], [place]         Date:2024                                                  Patient Name: Madiha Watson    MRN: [id number]    : 1950    Room: [address] [place] [address]      Evaluating OT: Shannan De La Torre OTR/L   [id number]      Referring Provider:Graham South MD     Specific Provider Orders/Date:OT eval and treat 2024      Diagnosis:  Closed nondisplaced intertrochanteric fracture of left femur with malunion [S72.145P]    Surgery:  L hip hemiarthroplasty  2024    Pertinent Medical History: per PT note:   Pt and  report she has been seeing a neurologist for evaluation of recent dyskinesia and impaired and slow speech that has come on for no apparent reason. They report MRI of brain was negative for CVA or tumor and had blood test performed and are awaiting results.    Precautions:  Fall Risk, WBAT L LE, posterolateral hip precautions     Assessment of current deficits    [x] Functional mobility  [x]ADLs  [x] Strength               [x]Cognition    [x] Functional transfers   [x] IADLs         [x] Safety Awareness   [x]Endurance    [x] Fine Coordination              [x] Balance      [] Vision/perception   [x]Sensation     []Gross Motor Coordination  [] ROM  [] Delirium                   [] Motor Control     OT PLAN OF CARE   OT POC based on physician orders, patient diagnosis and results of clinical assessment    Frequency/Duration  2-4 days/wk for 2 weeks PRN   Specific OT Treatment Interventions to include:   ADL retraining/adapted techniques and AE recommendations to increase functional independence within precautions                    Energy conservation techniques to improve tolerance for selfcare routine   Functional transfer/mobility training/DME recommendations for increased independence, safety and fall prevention         Patient/family education  to increase safety and functional independence             Environmental modifications for safe mobility and completion of ADLs                             Therapeutic activity to improve functional performance during ADLs.                                         Therapeutic exercise to improve tolerance and functional strength for ADLs    Balance retraining/tolerance tasks for facilitation of postural control with dynamic challenges during ADLs .      Positioning to improve functional independence      Recommended Adaptive Equipment: walker, BSC    Home Living: Pt lives with , 2 story with steps to enter   bed/bath on 1st   Bathroom setup: walk in shower    Equipment owned: cane, walker     Prior Level of Function: assist as needed  with ADLs , and  with IADLs; - since recent symptoms of dyskinesia and impaired speech  ambulated with cane or walker     Pain Level: no pain this session ;   Cognition: A&O: pleasant , following commands, conversign    Memory:  good    Sequencing:  fair+    Problem solving:  fair+    Judgement/safety:  fair +     Functional Assessment:  AM-PAC Daily Activity Raw Score: 15/24   Initial Eval Status  Date: 2/9/2024 Treatment Status  Date: STGs = LTGs  Time frame: 10-14 days   Feeding Min A   SBA   Grooming Min A  Standing at sink washing hands   SBA    UB Dressing Mod A   Min A    LB Dressing Max A   Educated patient, daughter and  on hip precautions and ADLS and reacher   Daughters and  will be assisting patient   Min A    Bathing Mod A   Min A   Toileting Patient able to complete hygiene seated on commode   SBA    Bed Mobility  Min A  Supine <> sit   Supervision    Functional Transfers Min A  Sit-stand from bed, commode   SBA   Functional Mobility Min A, w/walker   Ambulated to/from bathroom   SBA  with good tolerance    Balance Sitting:     Static:  Min-SBA - dyskinesia - causing LOB at times with sititng and standing     Dynamic:Mod A   Standing: Mod/Min A

## 2024-02-09 NOTE — PROGRESS NOTES
Progress Note    Patient:  Madiha Watson  YOB: 1950     73 y.o. female    Subjective:  Patient seen and examined.   No acute issues overnight.   No complaints or concerns.   Pain is controlled.   Patient denies F, HA, CP, SOB, N/V, numbness, or tingling.  Afebrile, VSS   (-) BM/flatus  Mobilized with PT    Objective:   Vitals:    02/09/24 1519   BP: 97/71   Pulse: 96   Resp: 18   Temp: 98.8 °F (37.1 °C)   SpO2: 96%     Gen: NAD, cooperative  MSK: LLE: TTP over the incision.  Dressing c/d/I. Compartments soft. 2+ DP pulse. TA/EHL/FHL/GS motor intact. Deep and Superficial Peroneal/Saphenous/Sural/Plantar SILT.      Meds:    See rec for complete list    Impression/plan: 73 y.o. female left hip FNF s/p left hip hemiarthroplasty , POD#1      - WBAT LLE  - Pain control: IV and PO, wean IV  - DVT ppx: ASA 81 mg BID  - Ice (20 minutes on and off 1 hour) and elevate (above heart) as needed for swelling/pain  - Encouraged incentive spirometry use  - continue mobilize with PT  - Please page ortho with any questions    Brendan King DO,   5:27 PM 2/9/2024

## 2024-02-09 NOTE — PROGRESS NOTES
evaluation  Evaluation of Education:  Verbalizes understanding    This plan may be re-evaluated and revised as warranted.      Evaluation Time includes thorough review of current medical information, gathering information on past medical history/social history and prior level of function, completion of standardized testing/informal observation of tasks, assessment of data and education on plan of care and goals.    [x]The admitting diagnosis and active problem list, have been reviewed prior to initiation of this evaluation.        ACTIVE PROBLEM LIST:   Patient Active Problem List   Diagnosis    Heart murmur    Atrial flutter (HCC)    Critical aortic valve stenosis    Red blood cell antibody positive    Aortic valve replaced    Closed nondisplaced intertrochanteric fracture of left femur with malunion    Closed left hip fracture (HCC)    Fall    Neurological movement disorder    Abnormal EKG    Closed fracture of neck of left femur (HCC)         CPT code:  75487  bedside swallow eval      Patient seen for swallow therapy 10 minutes.  Reviewed current solid/liquid consistency diet recommendation for   Easy to chew consistency solids (IDDSI level 7, transitional) with thin liquids (IDDSI level 0), MEDICATION ADMINISTRATION, Administer medication crushed, as able, with pudding/applesauce, and Administer medication whole, ONE AT A TIME, in pudding/applesauceand discussed compensatory strategies to ensure safe PO intake. Reviewed aspiration precautions.  Discussed use of Small bites/sips and NO STRAW  and making items less solid if they are noticing increased difficulty with speech due to fatigue to decrease risk of aspiration with implementation of strengthening exercises to improve swallow function as the long term goal.  Patient was able to restate compensatory strategies during session..  Patient will require ongoing education regarding dysphagia secondary to decreased ability to fully demonstrate and or restate  compensatory strategies and or diet modifications during session..        [id number]  dysphagia tx    Jolly Goldstein MSCCC/SLP  Speech Language Pathologist  [phone]

## 2024-02-09 NOTE — CARE COORDINATION
Met with patient and family about diagnosis and discharge plan of care. POD#1 left hip hemiarthroplasty. To work therapies today. Pt had mechanical fall at home. Lives with spouse in 2 story home, bed and bath on 1st floor. Pt has a large family and guy support group. Has raised toilet seat and walk in shower. Ordered wheeled walker through [facility]-private pay. Referral called to [facility]-orders completed and notified. PCP is Dr Arboleda. Plan is home. Will continue to follow. Have financial aide paper for family to fill out. Will follow-o

## 2024-02-10 VITALS
SYSTOLIC BLOOD PRESSURE: 107 MMHG | WEIGHT: 130 LBS | HEIGHT: 65 IN | RESPIRATION RATE: 17 BRPM | DIASTOLIC BLOOD PRESSURE: 83 MMHG | OXYGEN SATURATION: 96 % | TEMPERATURE: 98.7 F | HEART RATE: 100 BPM | BODY MASS INDEX: 21.66 KG/M2

## 2024-02-10 PROBLEM — R94.31 ABNORMAL EKG: Status: RESOLVED | Noted: 2024-02-08 | Resolved: 2024-02-10
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
## 2024-02-10 PROBLEM — S72.002A CLOSED FRACTURE OF NECK OF LEFT FEMUR (HCC): Status: RESOLVED | Noted: 2024-02-08 | Resolved: 2024-02-10

## 2024-02-10 LAB
ALBUMIN SERPL-MCNC: 3.1 G/DL (ref 3.5–5.2)
ALP SERPL-CCNC: 95 U/L (ref 35–104)
ALT SERPL-CCNC: 20 U/L (ref 0–32)
ANION GAP SERPL CALCULATED.3IONS-SCNC: 10 MMOL/L (ref 7–16)
AST SERPL-CCNC: 35 U/L (ref 0–31)
BASOPHILS # BLD: 0.11 K/UL (ref 0–0.2)
BASOPHILS NFR BLD: 1 % (ref 0–2)
BILIRUB SERPL-MCNC: 0.7 MG/DL (ref 0–1.2)
BUN SERPL-MCNC: 14 MG/DL (ref 6–23)
CALCIUM SERPL-MCNC: 7.8 MG/DL (ref 8.6–10.2)
CHLORIDE SERPL-SCNC: 106 MMOL/L (ref 98–107)
CO2 SERPL-SCNC: 25 MMOL/L (ref 22–29)
CREAT SERPL-MCNC: 0.5 MG/DL (ref 0.5–1)
EOSINOPHIL # BLD: 0.17 K/UL (ref 0.05–0.5)
EOSINOPHILS RELATIVE PERCENT: 2 % (ref 0–6)
ERYTHROCYTE [DISTWIDTH] IN BLOOD BY AUTOMATED COUNT: 16.8 % (ref 11.5–15)
GFR SERPL CREATININE-BSD FRML MDRD: >60 ML/MIN/1.73M2
GLUCOSE SERPL-MCNC: 101 MG/DL (ref 74–99)
HCT VFR BLD AUTO: 47.7 % (ref 34–48)
HGB BLD-MCNC: 15 G/DL (ref 11.5–15.5)
IMM GRANULOCYTES # BLD AUTO: 0.08 K/UL (ref 0–0.58)
IMM GRANULOCYTES NFR BLD: 1 % (ref 0–5)
LYMPHOCYTES NFR BLD: 1.28 K/UL (ref 1.5–4)
LYMPHOCYTES RELATIVE PERCENT: 13 % (ref 20–42)
MAGNESIUM SERPL-MCNC: 1.9 MG/DL (ref 1.6–2.6)
MCH RBC QN AUTO: 27.4 PG (ref 26–35)
MCHC RBC AUTO-ENTMCNC: 31.4 G/DL (ref 32–34.5)
MCV RBC AUTO: 87 FL (ref 80–99.9)
MONOCYTES NFR BLD: 0.72 K/UL (ref 0.1–0.95)
MONOCYTES NFR BLD: 7 % (ref 2–12)
NEUTROPHILS NFR BLD: 77 % (ref 43–80)
NEUTS SEG NFR BLD: 7.81 K/UL (ref 1.8–7.3)
PLATELET # BLD AUTO: 234 K/UL (ref 130–450)
PMV BLD AUTO: 10.2 FL (ref 7–12)
POTASSIUM SERPL-SCNC: 3.3 MMOL/L (ref 3.5–5)
PROT SERPL-MCNC: 5.4 G/DL (ref 6.4–8.3)
RBC # BLD AUTO: 5.48 M/UL (ref 3.5–5.5)
SODIUM SERPL-SCNC: 141 MMOL/L (ref 132–146)
WBC OTHER # BLD: 10.2 K/UL (ref 4.5–11.5)

## 2024-02-10 PROCEDURE — 97116 GAIT TRAINING THERAPY: CPT

## 2024-02-10 PROCEDURE — 6370000000 HC RX 637 (ALT 250 FOR IP): Performed by: STUDENT IN AN ORGANIZED HEALTH CARE EDUCATION/TRAINING PROGRAM

## 2024-02-10 PROCEDURE — 99238 HOSP IP/OBS DSCHRG MGMT 30/<: CPT | Performed by: FAMILY MEDICINE

## 2024-02-10 PROCEDURE — 83735 ASSAY OF MAGNESIUM: CPT

## 2024-02-10 PROCEDURE — 6370000000 HC RX 637 (ALT 250 FOR IP)

## 2024-02-10 PROCEDURE — 85025 COMPLETE CBC W/AUTO DIFF WBC: CPT

## 2024-02-10 PROCEDURE — 80053 COMPREHEN METABOLIC PANEL: CPT

## 2024-02-10 PROCEDURE — 97530 THERAPEUTIC ACTIVITIES: CPT

## 2024-02-10 RX ORDER — IBUPROFEN 800 MG/1
400 TABLET ORAL EVERY 8 HOURS PRN
Qty: 20 TABLET | Refills: 0 | Status: SHIPPED | OUTPATIENT
Start: 2024-02-10 | End: 2024-02-23

## 2024-02-10 RX ADMIN — CALCIUM 500 MG: 500 TABLET ORAL at 10:19

## 2024-02-10 RX ADMIN — Medication: at 10:19

## 2024-02-10 RX ADMIN — ASPIRIN 81 MG CHEWABLE TABLET 81 MG: 81 TABLET CHEWABLE at 10:19

## 2024-02-10 RX ADMIN — Medication 1 TABLET: at 10:19

## 2024-02-10 RX ADMIN — POTASSIUM BICARBONATE 40 MEQ: 782 TABLET, EFFERVESCENT ORAL at 10:19

## 2024-02-10 ASSESSMENT — ENCOUNTER SYMPTOMS
WHEEZING: 0
SHORTNESS OF BREATH: 0
CONSTIPATION: 0
VOMITING: 0
COUGH: 0
DIARRHEA: 0
ABDOMINAL PAIN: 0
NAUSEA: 0

## 2024-02-10 NOTE — CARE COORDINATION
Social Work/Discharge Planning:  Charge nurse informed this worker of patient score with therapy.  Met with patient, her  Scottie and son Clemente and discussed snf option.  Patient and her family prefers to return home at discharge with [facility].  [facility] order noted 2/9.  [facility] will need to be notified when patient discharges.  [facility] is following for a walker, order was placed 2/9.  Will continue to follow.  Electronically signed by YANY Siddiqi on 2/10/2024 at 10:38 AM

## 2024-02-10 NOTE — PROGRESS NOTES
Daily Treat        Evaluating PT:  Dg Demarco Jr., P.T.     Room #:  0739/07-A  Diagnosis:  Closed nondisplaced intertrochanteric fracture of left femur with malunion [S72.145P]  Pertinent PMHx/PSHx:  Pt and  report she has been seeing a neurologist for evaluation of recent dyskinesia and impaired and slow speech that has come on for no apparent reason.  They report MRI of brain was negative for CVA or tumor and had blood test performed and are awaiting results.   Procedure/Surgery:  2/8/24 L hip hemiarthroplasty  Precautions:  WBAT LLE, L hip posterolateral precautions, falls, bed/chair alarm if alone.     SUBJECTIVE:     Pt lives with her  and son in a 2 story home with 4 stairs and 2 rails to enter.  She has first floor bed and bath.  Pt ambulated with no AD PTA.  She has a cane and ww.  Recently she has been having neurological symptoms of dyskinesia and impaired speech and has been following with a neurologist PTA.      OBJECTIVE:    Initial Evaluation  Date: 2/9/24 Treatment  2/10/24 Short Term/ Long Term   Goals   Was pt agreeable to Eval/treatment? yes Yes      Does pt have pain? No c/o pain at rest, mild L hip pain with movement Min pain LT hip/LE, no c/o inc pain closed chain loading standing      Bed Mobility  Rolling: MIN A  Supine to sit: MOD A  Sit to supine: NA  Scooting: MIN A  Roll: Guided only   Sup-sit EOB: Min/CGA   Scoot to EOB: SBA/S/dyskinesias helping supervision   Transfers Sit to stand: MIN A  Stand to sit: MIN A  Stand pivot: MIN A/MOD A  Bed-stand: Min to CGA x 2-1  Stand-chair: CGA/SBA SBA   Ambulation   15 feet with ww MIN A/MOD A  30' WW Min/CGA x 1 150 feet with ww CGA   Stair negotiation: ascended and descended NA, pt fatigued with amb  NA 4 steps with 1-2 rails CGA   AM-PAC 6 Clicks 14/24  15/24        Therapeutic Exercises:  LTLE     Patient education  Pt educated on bed-stand transfers, stand-sit safety hand place, WBAT     Patient response to education:

## 2024-02-10 NOTE — PLAN OF CARE
Problem: Discharge Planning  Goal: Discharge to home or other facility with appropriate resources  Outcome: Completed     Problem: Pain  Goal: Verbalizes/displays adequate comfort level or baseline comfort level  Outcome: Completed     Problem: Skin/Tissue Integrity  Goal: Absence of new skin breakdown  Description: 1.  Monitor for areas of redness and/or skin breakdown  2.  Assess vascular access sites hourly  3.  Every 4-6 hours minimum:  Change oxygen saturation probe site  4.  Every 4-6 hours:  If on nasal continuous positive airway pressure, respiratory therapy assess nares and determine need for appliance change or resting period.  Outcome: Completed     Problem: Safety - Adult  Goal: Free from fall injury  Outcome: Completed     Problem: ABCDS Injury Assessment  Goal: Absence of physical injury  Outcome: Completed     Problem: Chronic Conditions and Co-morbidities  Goal: Patient's chronic conditions and co-morbidity symptoms are monitored and maintained or improved  Outcome: Completed     Problem: Nutrition Deficit:  Goal: Optimize nutritional status  Outcome: Completed

## 2024-02-10 NOTE — PLAN OF CARE
Problem: Discharge Planning  Goal: Discharge to home or other facility with appropriate resources  2/9/2024 1904 by Beharry, Phyllis, RN  Outcome: Progressing  2/9/2024 1849 by Beharry, Phyllis, RN  Outcome: Progressing     Problem: Pain  Goal: Verbalizes/displays adequate comfort level or baseline comfort level  2/9/2024 1904 by Beharry, Phyllis, RN  Outcome: Progressing  2/9/2024 1849 by Beharry, Phyllis, RN  Outcome: Progressing     Problem: Skin/Tissue Integrity  Goal: Absence of new skin breakdown  Description: 1.  Monitor for areas of redness and/or skin breakdown  2.  Assess vascular access sites hourly  3.  Every 4-6 hours minimum:  Change oxygen saturation probe site  4.  Every 4-6 hours:  If on nasal continuous positive airway pressure, respiratory therapy assess nares and determine need for appliance change or resting period.  2/9/2024 1904 by Beharry, Phyllis, RN  Outcome: Progressing  2/9/2024 1849 by Beharry, Phyllis, RN  Outcome: Progressing     Problem: Safety - Adult  Goal: Free from fall injury  2/9/2024 1904 by Beharry, Phyllis, RN  Outcome: Progressing  2/9/2024 1849 by Beharry, Phyllis, RN  Outcome: Progressing     Problem: ABCDS Injury Assessment  Goal: Absence of physical injury  2/9/2024 1904 by Beharry, Phyllis, RN  Outcome: Progressing  2/9/2024 1849 by Beharry, Phyllis, RN  Outcome: Progressing     Problem: Chronic Conditions and Co-morbidities  Goal: Patient's chronic conditions and co-morbidity symptoms are monitored and maintained or improved  2/9/2024 1904 by Beharry, Phyllis, RN  Outcome: Progressing  2/9/2024 1849 by Beharry, Phyllis, RN  Outcome: Progressing

## 2024-02-10 NOTE — PROGRESS NOTES
Left VM for [facility] on-call to notify of patient's DC home today. Also notified that patient requesting to return wheeled walker that was previously delivered by [facility], as they already have walker at home.

## 2024-02-10 NOTE — PROGRESS NOTES
[facility]  Progress Note    Chief complaint :  Chief Complaint   Patient presents with    Fall     Pt fell at home around noon. Left leg pain. Not able to stand.       Subjective:    No overnight problems. Patient describes feeling better. Patient denies chest pain, SOB, nausea, vomiting, fever, chills, changes in urination, changes in BM. Patient is tolerating diet.    Mrs. Watson was seen this morning sitting at the edge of her bed eating her breakfast.  She states she is feeling well other than some soreness in her leg depending on the position she is in.  She says physical therapy saw her yesterday and she was able to ambulate decently.  She is hopeful to go home today and states she has lots of help with her family members as well as her PCP.  She denies any other complaints.  She has not had a bowel movement yet however has been passing flatus.    Past medical, surgical, family and social history were reviewed, non-contributory, and unchanged unless otherwise stated.    Review of Systems   Constitutional:  Negative for activity change, appetite change, chills and fever.   HENT:  Positive for mouth sores (Single canker sore in her lip).    Respiratory:  Negative for cough, shortness of breath and wheezing.    Cardiovascular:  Negative for chest pain, palpitations and leg swelling.   Gastrointestinal:  Negative for abdominal pain, constipation, diarrhea, nausea and vomiting.   Genitourinary:  Negative for difficulty urinating and dysuria.   Musculoskeletal:         Left knee and thigh pain    Neurological:  Negative for dizziness and headaches.   Psychiatric/Behavioral:          Slurred speech, tremor        Objective:  BP 97/76   Pulse 85   Temp 98.7 °F (37.1 °C) (Oral)   Resp 16   Ht 1.651 m (5' 5\")   Wt 59 kg (130 lb)   SpO2 95%   BMI 21.63 kg/m²     Physical Exam  Constitutional:       Appearance: Normal appearance. She is normal weight.   HENT:      Head:

## 2024-02-10 NOTE — PLAN OF CARE
Problem: Discharge Planning  Goal: Discharge to home or other facility with appropriate resources  2/9/2024 2254 by Torito Amezcua RN  Outcome: Progressing     Problem: Pain  Goal: Verbalizes/displays adequate comfort level or baseline comfort level  2/9/2024 2254 by Torito Amezcua, RN  Outcome: Progressing     Problem: Skin/Tissue Integrity  Goal: Absence of new skin breakdown  Description: 1.  Monitor for areas of redness and/or skin breakdown  2.  Assess vascular access sites hourly  3.  Every 4-6 hours minimum:  Change oxygen saturation probe site  4.  Every 4-6 hours:  If on nasal continuous positive airway pressure, respiratory therapy assess nares and determine need for appliance change or resting period.  2/9/2024 2254 by Torito Amezcua RN  Outcome: Progressing     Problem: Safety - Adult  Goal: Free from fall injury  2/9/2024 2254 by Torito Amezcua RN  Outcome: Progressing     Problem: ABCDS Injury Assessment  Goal: Absence of physical injury  2/9/2024 2254 by Torito Amezcua RN  Outcome: Progressing     Problem: Chronic Conditions and Co-morbidities  Goal: Patient's chronic conditions and co-morbidity symptoms are monitored and maintained or improved  2/9/2024 2254 by Torito Amezcua RN  Outcome: Progressing

## 2024-02-10 NOTE — PROGRESS NOTES
SPIRITUAL HEALTH SERVICES - [facility]  PROGRESS NOTE    Name: Madiha Watson                Oriental orthodox: Meeonite   Anointed (Last Rites): No    Referral: Routine Visit    Assessment:  Upon entering the room  observes patient laying in bed.  Patient's  was standing next to the patient's bed side.  Patient was calm and approachable.      Intervention:   provided active listening, prayer, and nurtured hope.   left devotional material and information about  services.    Outcome:  Patient and spouse expressed gratitude.    Plan:  Chaplains will remain available to offer spiritual and emotional support as needed.      Electronically signed by Chaplain Caty, on 2/9/2024 at 7:57 PM.  Spiritual Care Department  [facility]  [phone]

## 2024-02-10 NOTE — CONSULTS
Comprehensive Nutrition Assessment    Type and Reason for Visit:  Initial, Consult (ONS)    Nutrition Recommendations/Plan:   Continue current diet as tolerated & monitor  Will add Ensure BID     Malnutrition Assessment:  Malnutrition Status:  At risk for malnutrition (Comment) (02/10/24 1139)    Context:  Chronic Illness     Findings of the 6 clinical characteristics of malnutrition:  Energy Intake:  75% or less estimated energy requirements for 1 month or longer  Weight Loss:  Unable to assess (d/t UTO updated CBW)     Body Fat Loss:  Unable to assess (thin appearance, difficult to assess d/t involuntary movements)     Muscle Mass Loss:  Unable to assess (thin appearance, difficult to assess d/t involuntary movements)    Fluid Accumulation:  No significant fluid accumulation     Strength:  Not Performed    Nutrition Assessment:    Pt w/ L hip fracture s/p L hemiarthroplasty 2/8. Note pt being worked up for unknown neurologic condition. Currently receiving Easy to Chew diet per SLP recommendation. Will add ONS per discussion w/ pt & family, will monitor.    Nutrition Related Findings:    A&O X4, I&Os not avail, no edema, abd WDL, K+ 3.3, canker sore, thin appearance, involuntary movements/slurred speech   Wound Type: Surgical Incision       Current Nutrition Intake & Therapies:    Average Meal Intake: 26-50%  Average Supplements Intake: None Ordered  ADULT DIET; Easy to Chew    Anthropometric Measures:  Height: 165.1 cm (5' 5\")  Ideal Body Weight (IBW): 125 lbs (57 kg)       Current Body Weight: 59 kg (130 lb) (2/7 subjective, UTO updated CBW d/t pt sitting in chair), 104 % IBW. Weight Source: Stated  Current BMI (kg/m2): 21.6  Usual Body Weight:  (no actual EMR wt hx <1 year, 10/25/23 132# OV no method)                       BMI Categories: Underweight (BMI less than 22) age over 65    Nutrition Diagnosis:   Inadequate oral intake related to inadequate protein-energy intake as evidenced by intake 26-50%, poor

## 2024-02-13 LAB — SURGICAL PATHOLOGY REPORT: NORMAL

## 2024-02-23 ENCOUNTER — OFFICE VISIT (OUTPATIENT)
Dept: FAMILY MEDICINE CLINIC | Age: 74
End: 2024-02-23

## 2024-02-23 VITALS
HEART RATE: 111 BPM | WEIGHT: 123 LBS | BODY MASS INDEX: 20.49 KG/M2 | TEMPERATURE: 97.7 F | DIASTOLIC BLOOD PRESSURE: 64 MMHG | SYSTOLIC BLOOD PRESSURE: 106 MMHG | OXYGEN SATURATION: 98 % | HEIGHT: 65 IN

## 2024-02-23 DIAGNOSIS — S72.002D CLOSED FRACTURE OF LEFT HIP WITH ROUTINE HEALING, SUBSEQUENT ENCOUNTER: Primary | ICD-10-CM

## 2024-02-23 PROCEDURE — 99213 OFFICE O/P EST LOW 20 MIN: CPT | Performed by: FAMILY MEDICINE

## 2024-02-23 PROCEDURE — 1123F ACP DISCUSS/DSCN MKR DOCD: CPT | Performed by: FAMILY MEDICINE

## 2024-02-23 ASSESSMENT — PATIENT HEALTH QUESTIONNAIRE - PHQ9
2. FEELING DOWN, DEPRESSED OR HOPELESS: 0
SUM OF ALL RESPONSES TO PHQ QUESTIONS 1-9: 0
SUM OF ALL RESPONSES TO PHQ9 QUESTIONS 1 & 2: 0
SUM OF ALL RESPONSES TO PHQ QUESTIONS 1-9: 0
1. LITTLE INTEREST OR PLEASURE IN DOING THINGS: 0
SUM OF ALL RESPONSES TO PHQ QUESTIONS 1-9: 0
SUM OF ALL RESPONSES TO PHQ QUESTIONS 1-9: 0

## 2024-02-23 NOTE — PROGRESS NOTES
Smokeless tobacco: Never   Vaping Use    Vaping Use: Never used   Substance Use Topics    Alcohol use: No    Drug use: No       Chart reviewed and updated where appropriate for PMH, Fam, and Soc Hx.  _________________________________________________________  ROS: POSITIVE: As in the HPI.   Otherwise Pertinent negatives are negative.    __________________________________________________________  Physical Exam   Constitutional:    She is oriented to person, place, and time.    She appears well-developed and well-nourished.   Eyes:    Conjunctivae are normal.    Pupils are equal, round, and reactive to light.    EOMI.   Cardiovascular:    Normal rate, regular rhythm and normal heart sounds.     No murmur. No gallop and no friction rub.   Pulmonary/Chest:    Effort normal and breath sounds normal.    No wheezes. No rales or rhonchi.  Musculoskeletal:    Normal range of motion.     No joint swelling noted.    No peripheral edema.  Karena from a seated position without issue.  Gait steady with a walker and confident.  Neurological:    She is A&Ox3    Motor and sensation grossly intact.     Normal Gait.  Some gross motor tremors movements.  Speech impaired with this.  Skin:    Skin is warm and dry.    No rashes, No lesions.  Psychiatric:    She has a normal mood and affect.    Normal groom and dress. No SI or HI.   ________________________________________________________    This note may have been created using dictation software. Efforts were made to reduce errors, but some may persist.

## 2024-02-28 ENCOUNTER — TELEPHONE (OUTPATIENT)
Dept: CARDIOLOGY CLINIC | Age: 74
End: 2024-02-28

## 2024-03-07 ENCOUNTER — TELEPHONE (OUTPATIENT)
Dept: FAMILY MEDICINE CLINIC | Age: 74
End: 2024-03-07

## 2024-03-07 NOTE — TELEPHONE ENCOUNTER
Spoke with patient. She says she received a letter in the mail telling her to schedule appointment with cardiology. She asks if she needs to do this? She isn't sure why they are contacting her. Please advise.

## 2024-03-07 NOTE — TELEPHONE ENCOUNTER
I think this is follow up from her hospitalization and likely to check on how her valve replacement is doing.  Its bene about 2 years since It was last checked out, so seeing cardiology would not be unreasonable. It is not a life or death thing though right now if she is feeling well.   May be good to get her foot in the door there though.

## 2024-03-07 NOTE — TELEPHONE ENCOUNTER
----- Message from Bisi Gagnon sent at 3/7/2024 10:59 AM EST -----  Subject: Message to Provider    QUESTIONS  Information for Provider? Patient requests call back as she has a referral   to a cardiologist -Dr. Orlando Rodriguez was told to ask Dr. Arboleda if   she needs to make this appointment .   ---------------------------------------------------------------------------  --------------  CALL BACK INFO  [phone]; OK to leave message on voicemail  ---------------------------------------------------------------------------  --------------  SCRIPT ANSWERS  Relationship to Patient? Self

## 2024-04-09 ENCOUNTER — OFFICE VISIT (OUTPATIENT)
Dept: FAMILY MEDICINE CLINIC | Age: 74
End: 2024-04-09

## 2024-04-09 VITALS
WEIGHT: 121 LBS | OXYGEN SATURATION: 97 % | HEART RATE: 95 BPM | HEIGHT: 65 IN | TEMPERATURE: 98.3 F | SYSTOLIC BLOOD PRESSURE: 120 MMHG | BODY MASS INDEX: 20.16 KG/M2 | DIASTOLIC BLOOD PRESSURE: 86 MMHG

## 2024-04-09 DIAGNOSIS — T16.1XXA FOREIGN BODY OF RIGHT EAR, INITIAL ENCOUNTER: ICD-10-CM

## 2024-04-09 DIAGNOSIS — H61.23 IMPACTED CERUMEN, BILATERAL: Primary | ICD-10-CM

## 2024-04-09 PROCEDURE — 99213 OFFICE O/P EST LOW 20 MIN: CPT | Performed by: PHYSICIAN ASSISTANT

## 2024-04-09 PROCEDURE — 69210 REMOVE IMPACTED EAR WAX UNI: CPT | Performed by: PHYSICIAN ASSISTANT

## 2024-04-09 PROCEDURE — 1123F ACP DISCUSS/DSCN MKR DOCD: CPT | Performed by: PHYSICIAN ASSISTANT

## 2024-04-09 NOTE — PROGRESS NOTES
avoid Q-tip use to prevent recurrence. PCP if symptoms recur. ED sooner if symptoms worsen or change.  ED immediately with fever, severe/worsening ear pain, mastoid redness/tenderness, neck stiffness, CP, dyspnea, or dysphagia. Pt is in agreement with this care plan. All questions answered.    Michelle Tovar PA-C    **This report was transcribed using voice recognition software. Every effort was made to ensure accuracy; however, inadvertent computerized transcription errors may be present.

## 2024-07-29 ENCOUNTER — OFFICE VISIT (OUTPATIENT)
Dept: CARDIOLOGY CLINIC | Age: 74
End: 2024-07-29

## 2024-07-29 VITALS
BODY MASS INDEX: 17.34 KG/M2 | DIASTOLIC BLOOD PRESSURE: 60 MMHG | HEIGHT: 66 IN | RESPIRATION RATE: 18 BRPM | SYSTOLIC BLOOD PRESSURE: 106 MMHG | HEART RATE: 121 BPM | WEIGHT: 107.9 LBS

## 2024-07-29 DIAGNOSIS — R01.1 HEART MURMUR: Primary | ICD-10-CM

## 2024-07-29 PROCEDURE — 93000 ELECTROCARDIOGRAM COMPLETE: CPT | Performed by: INTERNAL MEDICINE

## 2024-07-29 PROCEDURE — 1123F ACP DISCUSS/DSCN MKR DOCD: CPT | Performed by: INTERNAL MEDICINE

## 2024-07-29 PROCEDURE — 99214 OFFICE O/P EST MOD 30 MIN: CPT | Performed by: INTERNAL MEDICINE

## 2024-07-29 NOTE — PROGRESS NOTES
Lived in the Last Year: 1     Unstable Housing in the Last Year: No       Family History   Problem Relation Age of Onset    High Blood Pressure Mother     Heart Disease Father     Heart Disease Brother         cabg    Heart Disease Sister     No Known Problems Brother     Other Sister         Tumor, unsure, possibly uterine .       Review of Systems:   Heart: as above   Lungs: as above   Eyes: denies changes in vision or discharge.   Ears: denies changes in hearing or pain.   Nose: denies epistaxis or masses   Throat: denies sore throat or trouble swallowing.   Neuro: denies numbness, tingling, tremors.   Skin: denies rashes or itching.   : denies hematuria, dysuria   GI: denies vomiting, diarrhea   Psych: denies mood changed, anxiety, depression.  all others negative.    Physical Exam   /60   Pulse (!) 121   Resp 18   Ht 1.676 m (5' 6\")   Wt 48.9 kg (107 lb 14.4 oz)   BMI 17.42 kg/m²   Constitutional: Oriented to person, place, and time. Well-developed and well-nourished. No distress.    Head: Normocephalic and atraumatic.   Eyes: EOM are normal. Pupils are equal, round, and reactive to light.   Neck: Normal range of motion. Neck supple. No hepatojugular reflux and no JVD present. Carotid bruit is not present. No tracheal deviation present. No thyromegaly present.   Cardiovascular: Normal rate, regular rhythm, normal heart sounds and intact distal pulses.  Exam reveals no gallop and no friction rub.  No murmur heard.  Pulmonary/Chest: Effort normal and breath sounds normal. No respiratory distress. No wheezes. No rales. No tenderness.   Abdominal: Soft. Bowel sounds are normal. No distension and no mass. No tenderness. No rebound and no guarding.   Musculoskeletal: Normal range of motion. No edema and no tenderness.   Lymphadenopathy:   No cervical adenopathy. No groin adenopathy.  Neurological: Alert and oriented to person, place, and time.   Skin: Skin is warm and dry. No rash noted. Not diaphoretic.

## (undated) DEVICE — NEEDLE SPNL 20GA L3.5IN YEL HUB S STL REG WALL FIT STYL W/

## (undated) DEVICE — STAPLER SKIN L39MM DIA0.53MM CRWN 5.7MM S STL FIX HD PROX

## (undated) DEVICE — KIT SURG W7XL11IN 2 PKT UNTREATED NA

## (undated) DEVICE — SOLUTION IRRIG 3000ML 0.9% SOD CHL USP UROMATIC PLAS CONT

## (undated) DEVICE — CATHETER DIAG AD 5FR L100CM COR GRY HYDRPHLC NYL MPA 2 W/ 2

## (undated) DEVICE — KIT MFLD ISOLATN NACL CNTRST PRT TBNG SPIK W/ PRSS TRNSDUC

## (undated) DEVICE — GLOVE SURG SZ 65 THK91MIL LTX FREE SYN POLYISOPRENE

## (undated) DEVICE — GUIDEWIRE VASC L260CM DIA0.035IN TAPR L11CM FLPY TIP L4CM

## (undated) DEVICE — SET TRNQT W/ STD 7IN 12FR 5 TB 1 SNR DLP

## (undated) DEVICE — HANDPIECE SET WITH COAXIAL HIGH FLOW TIP AND SUCTION TUBE: Brand: INTERPULSE

## (undated) DEVICE — CHLORAPREP 26ML ORANGE

## (undated) DEVICE — TAPE ADH W3INXL10YD WHT COT WVN BK POWERFUL RUB BASE HIGHLY

## (undated) DEVICE — PERCUTANEOUS ENTRY THINWALL NEEDLE  ONE-PART: Brand: COOK

## (undated) DEVICE — CATHETER PACE 5FR L110CM 6FR INTRO D10CM 1MM SPACE POLYUR

## (undated) DEVICE — SYRINGE MED 50ML LUERLOCK TIP

## (undated) DEVICE — ELECTRODE PT RET AD L9FT HI MOIST COND ADH HYDRGEL CORDED

## (undated) DEVICE — DRESSING FOAM W22XL25CM FILVE LAYR FOAM DP DEF SAFETAC

## (undated) DEVICE — AGENT HEMSTAT W4XL8IN OXIDIZED REGENERATED CELOS ABSRB

## (undated) DEVICE — CATH ANGIO 5FR .045IN AL1 100CM EXPO

## (undated) DEVICE — BLADE ES L6IN ELASTOMERIC COAT EXT DURABLE BEND UPTO 90DEG

## (undated) DEVICE — SET CARDIAC I

## (undated) DEVICE — CATHETER ANGIO PIG 0.045 IN 5 FRX110 CM VENTRICULAR EXPO

## (undated) DEVICE — NEEDLE,22GX1.5",REG,BEVEL: Brand: MEDLINE

## (undated) DEVICE — BOWL MED L 32OZ PLAS W/ MOLD GRAD EZ OPN PEEL PCH

## (undated) DEVICE — SOLUTION IRRIG 1000ML 0.9% SOD CHL USP POUR PLAS BTL

## (undated) DEVICE — STRIP,CLOSURE,WOUND,MEDI-STRIP,1/2X4: Brand: MEDLINE

## (undated) DEVICE — INTRO SHEATH W/6.0FRX10CMX.0385IN

## (undated) DEVICE — ALCOHOL RUBBING ISO 16OZ 70%

## (undated) DEVICE — LIQUIBAND RAPID ADHESIVE 36/CS 0.8ML: Brand: MEDLINE

## (undated) DEVICE — Z INACTIVE USE 2641837 CLIP LIG M BLU TI HRT SHP WIRE HORZ 600 PER BX

## (undated) DEVICE — SOLUTION IRRIG 1000ML STRL H2O USP PLAS POUR BTL

## (undated) DEVICE — CLOTH SURG PREP PREOPERATIVE CHLORHEXIDINE GLUC 2% READYPREP

## (undated) DEVICE — LOOP VES W25MM THK1MM MAXI RED SIL FLD REPELLENT 100 PER

## (undated) DEVICE — MEDI-TRACE CADENCE ADULT, PRE-CONNECT  DEFIBRILLATION ELECTRODE (10 PR/PK) - PHYSIO-CONTROL: Brand: MEDI-TRACE CADENCE

## (undated) DEVICE — MEDIA CONTRAST RX ISOVUE-300 61% 30ML VIALS

## (undated) DEVICE — PACK SURG CARDIAC CATH DYNJ38860] MEDLINE INDUSTRIES INC]

## (undated) DEVICE — CATHETER DIAG 5FR L110CM ID0.045IN VENT VASC PGTL 145 EXPO

## (undated) DEVICE — COVER,TABLE,60X90,STERILE: Brand: MEDLINE

## (undated) DEVICE — SYRINGE MED 10ML LUERLOCK TIP W/O SFTY DISP

## (undated) DEVICE — DRAPE,REIN 53X77,STERILE: Brand: MEDLINE

## (undated) DEVICE — APPLICATOR MEDICATED 26 CC SOLUTION HI LT ORNG CHLORAPREP

## (undated) DEVICE — INTENDED FOR TISSUE SEPARATION, AND OTHER PROCEDURES THAT REQUIRE A SHARP SURGICAL BLADE TO PUNCTURE OR CUT.: Brand: BARD-PARKER ® STAINLESS STEEL BLADES

## (undated) DEVICE — SOLUTION IV IRRIG WATER 1000ML POUR BRL 2F7114

## (undated) DEVICE — PEEL-AWAY HOOD: Brand: FLYTE, SURGICOOL

## (undated) DEVICE — DOUBLE BASIN SET: Brand: MEDLINE INDUSTRIES, INC.

## (undated) DEVICE — COVER PRB W14XL147CM TELESCOPICALLY FLD EXT LEN CIV-FLEX

## (undated) DEVICE — GLOVE SURG SZ 6 THK91MIL LTX FREE SYN POLYISOPRENE ANTI

## (undated) DEVICE — CARDIAC STRYKER STERNAL SAW

## (undated) DEVICE — Z DUP USE 2257490 ADHESIVE SKIN CLSRE 036ML TPCL 2CTL CNCRLTE HIGH VSCSTY DRMB

## (undated) DEVICE — TOWEL,OR,DSP,ST,BLUE,STD,6/PK,12PK/CS: Brand: MEDLINE

## (undated) DEVICE — DRESSING HYDROFIBER AQUACEL AG ADVANTAGE 3.5X10 IN

## (undated) DEVICE — SYRINGE MED 30ML STD CLR PLAS LUERLOCK TIP N CTRL DISP

## (undated) DEVICE — SNAP KOVER: Brand: UNBRANDED

## (undated) DEVICE — INTRODUCER SHTH 6FR L12CM DIA0.038IN HEMSTAS CLOSE TOL

## (undated) DEVICE — GLOVE ORANGE PI 7 1/2   MSG9075

## (undated) DEVICE — DRAPE,TOP,102X53,STERILE: Brand: MEDLINE

## (undated) DEVICE — SPONGE LAP W18XL18IN WHT COT 4 PLY FLD STRUNG RADPQ DISP ST 2 PER PACK

## (undated) DEVICE — PACK PROCEDURE SURG GEN CUST

## (undated) DEVICE — TOTAL HIP PK

## (undated) DEVICE — LARGE BORE STOPCOCK WITH ROTATING MALE LUER LOCK

## (undated) DEVICE — HOLDER NDL WEBST SNAG FREE

## (undated) DEVICE — KIT MED IMAG CNTRST AGNT W/ IOPAMIDOL REUSE

## (undated) DEVICE — GUIDEWIRE ANGIO L150CM OD0.035IN STR FIX PTFE SLD SUPP

## (undated) DEVICE — GUIDEWIRE VASC L145CM 0.035IN J TIP L3MM PTFE FIX COR NAMIC

## (undated) DEVICE — 3M™ IOBAN™ 2 ANTIMICROBIAL INCISE DRAPE 6651EZ: Brand: IOBAN™ 2

## (undated) DEVICE — DRESSING TRNSPAR W4XL55IN ACRYL SUP FLM W ADH WTRPRF OPSITE

## (undated) DEVICE — TUBING, SUCTION, 9/32" X 10', STRAIGHT: Brand: MEDLINE

## (undated) DEVICE — 3M™ STERI-DRAPE™ CARDIOVASCULAR SHEET WITH IOBAN™ 2 INCISE FILM 6677: Brand: STERI-DRAPE™ IOBAN™ 2

## (undated) DEVICE — ANGIOPLASTY ADD-ON PACK: Brand: MEDLINE INDUSTRIES, INC.

## (undated) DEVICE — PILLOW POS W15XH6XL22IN RASPBERRY FOAM ABD W/ STRP DISP FOR

## (undated) DEVICE — GOWN,SIRUS,FABRNF,L,20/CS: Brand: MEDLINE

## (undated) DEVICE — SET CARDIAC II

## (undated) DEVICE — 3M™ STERI-DRAPE™ U-DRAPE 1015: Brand: STERI-DRAPE™

## (undated) DEVICE — SYRINGE 20ML LL S/C 50

## (undated) DEVICE — BLADE CLIPPER GEN PURP NS

## (undated) DEVICE — LABEL MED CARD SURG 4 IN PANEL STRL

## (undated) DEVICE — 2108 SERIES SAGITTAL BLADE, OFFSET (20.0 X 0.89 X 80.0MM)

## (undated) DEVICE — GLOVE ORANGE PI 8   MSG9080

## (undated) DEVICE — DISPOSABLE FEMORAL CEMENT                                    COMPRESSOR CAP STERILE

## (undated) DEVICE — AMPLATZ EXTRA STIFF WIRE GUIDE: Brand: AMPLATZ

## (undated) DEVICE — GUIDEWIRE VASC L260CM 0.035IN J TIP L3MM PTFE FIX COR NAMIC

## (undated) DEVICE — KIT HND CTRL 3 W STPCOCK ROT END 54IN PREM HI PRSS TBNG AT

## (undated) DEVICE — SOLUTION IV 1000ML 0.9% SOD CHL PH 5 INJ USP VIAFLX PLAS

## (undated) DEVICE — PICO 7 10CM X 20CM: Brand: PICO™ 7

## (undated) DEVICE — PADDLE INTERN DEFIB CHILD

## (undated) DEVICE — DRAPE SHEET: Brand: UNBRANDED

## (undated) DEVICE — PATIENT RETURN ELECTRODE, SINGLE-USE, CONTACT QUALITY MONITORING, ADULT, WITH 9FT CORD, FOR PATIENTS WEIGING OVER 33LBS. (15KG): Brand: MEGADYNE

## (undated) DEVICE — SURGICAL PROCEDURE PACK BASIC

## (undated) DEVICE — 4-PORT MANIFOLD: Brand: NEPTUNE 2

## (undated) DEVICE — GLOVE ORANGE PI 7   MSG9070

## (undated) DEVICE — TUBING PRSS MON L12IN PVC RIG NONEXPANDING M TO FEM CONN

## (undated) DEVICE — GOWN,SIRUS,FABRNF,XL,20/CS: Brand: MEDLINE